# Patient Record
Sex: FEMALE | Race: WHITE
[De-identification: names, ages, dates, MRNs, and addresses within clinical notes are randomized per-mention and may not be internally consistent; named-entity substitution may affect disease eponyms.]

---

## 2017-03-15 ENCOUNTER — HOSPITAL ENCOUNTER (OUTPATIENT)
Dept: HOSPITAL 23 - CCAT | Age: 42
Discharge: HOME | End: 2017-03-15
Payer: COMMERCIAL

## 2017-03-15 DIAGNOSIS — J84.10: ICD-10-CM

## 2017-03-15 DIAGNOSIS — R91.1: Primary | ICD-10-CM

## 2018-03-06 ENCOUNTER — OFFICE VISIT CONVERTED (OUTPATIENT)
Dept: SURGERY | Facility: CLINIC | Age: 43
End: 2018-03-06
Attending: SURGERY

## 2018-03-27 ENCOUNTER — OFFICE VISIT CONVERTED (OUTPATIENT)
Dept: SURGERY | Facility: CLINIC | Age: 43
End: 2018-03-27
Attending: SURGERY

## 2018-08-15 ENCOUNTER — OFFICE VISIT CONVERTED (OUTPATIENT)
Dept: FAMILY MEDICINE CLINIC | Facility: CLINIC | Age: 43
End: 2018-08-15
Attending: NURSE PRACTITIONER

## 2018-08-16 ENCOUNTER — OFFICE VISIT CONVERTED (OUTPATIENT)
Dept: FAMILY MEDICINE CLINIC | Facility: CLINIC | Age: 43
End: 2018-08-16
Attending: NURSE PRACTITIONER

## 2018-08-16 ENCOUNTER — CONVERSION ENCOUNTER (OUTPATIENT)
Dept: FAMILY MEDICINE CLINIC | Facility: CLINIC | Age: 43
End: 2018-08-16

## 2018-08-30 ENCOUNTER — OFFICE VISIT CONVERTED (OUTPATIENT)
Dept: FAMILY MEDICINE CLINIC | Facility: CLINIC | Age: 43
End: 2018-08-30
Attending: NURSE PRACTITIONER

## 2018-10-09 ENCOUNTER — CONVERSION ENCOUNTER (OUTPATIENT)
Dept: FAMILY MEDICINE CLINIC | Facility: CLINIC | Age: 43
End: 2018-10-09

## 2018-10-09 ENCOUNTER — OFFICE VISIT CONVERTED (OUTPATIENT)
Dept: FAMILY MEDICINE CLINIC | Facility: CLINIC | Age: 43
End: 2018-10-09
Attending: NURSE PRACTITIONER

## 2018-10-26 ENCOUNTER — OFFICE VISIT CONVERTED (OUTPATIENT)
Dept: FAMILY MEDICINE CLINIC | Facility: CLINIC | Age: 43
End: 2018-10-26
Attending: NURSE PRACTITIONER

## 2018-10-30 ENCOUNTER — OFFICE VISIT CONVERTED (OUTPATIENT)
Dept: FAMILY MEDICINE CLINIC | Facility: CLINIC | Age: 43
End: 2018-10-30
Attending: NURSE PRACTITIONER

## 2018-12-11 ENCOUNTER — OFFICE VISIT CONVERTED (OUTPATIENT)
Dept: FAMILY MEDICINE CLINIC | Facility: CLINIC | Age: 43
End: 2018-12-11
Attending: NURSE PRACTITIONER

## 2019-01-15 ENCOUNTER — CONVERSION ENCOUNTER (OUTPATIENT)
Dept: FAMILY MEDICINE CLINIC | Facility: CLINIC | Age: 44
End: 2019-01-15

## 2019-01-15 ENCOUNTER — OFFICE VISIT CONVERTED (OUTPATIENT)
Dept: FAMILY MEDICINE CLINIC | Facility: CLINIC | Age: 44
End: 2019-01-15
Attending: FAMILY MEDICINE

## 2019-02-21 ENCOUNTER — OFFICE VISIT CONVERTED (OUTPATIENT)
Dept: FAMILY MEDICINE CLINIC | Facility: CLINIC | Age: 44
End: 2019-02-21
Attending: NURSE PRACTITIONER

## 2019-02-21 ENCOUNTER — HOSPITAL ENCOUNTER (OUTPATIENT)
Dept: FAMILY MEDICINE CLINIC | Facility: CLINIC | Age: 44
Discharge: HOME | End: 2019-02-21
Attending: NURSE PRACTITIONER

## 2019-02-21 ENCOUNTER — CONVERSION ENCOUNTER (OUTPATIENT)
Dept: FAMILY MEDICINE CLINIC | Facility: CLINIC | Age: 44
End: 2019-02-21

## 2019-02-21 LAB
ALBUMIN SERPL-MCNC: 4.1 G/DL (ref 3.5–5)
ALBUMIN/GLOB SERPL: 1.5 {RATIO} (ref 1.4–2.6)
ALP SERPL-CCNC: 64 U/L (ref 42–98)
ALT SERPL-CCNC: 19 U/L (ref 10–40)
ANION GAP SERPL CALC-SCNC: 13 MMOL/L (ref 8–19)
AST SERPL-CCNC: 16 U/L (ref 15–50)
BASOPHILS # BLD AUTO: 0.05 10*3/UL (ref 0–0.2)
BASOPHILS NFR BLD AUTO: 0.4 % (ref 0–3)
BILIRUB SERPL-MCNC: 0.23 MG/DL (ref 0.2–1.3)
BUN SERPL-MCNC: 15 MG/DL (ref 5–25)
BUN/CREAT SERPL: 21 {RATIO} (ref 6–20)
CALCIUM SERPL-MCNC: 8.6 MG/DL (ref 8.7–10.4)
CHLORIDE SERPL-SCNC: 107 MMOL/L (ref 99–111)
CONV ABS IMM GRAN: 0.06 10*3/UL (ref 0–0.2)
CONV CO2: 26 MMOL/L (ref 22–32)
CONV IMMATURE GRAN: 0.5 % (ref 0–1.8)
CONV TOTAL PROTEIN: 6.9 G/DL (ref 6.3–8.2)
CREAT UR-MCNC: 0.73 MG/DL (ref 0.5–0.9)
DEPRECATED RDW RBC AUTO: 43.7 FL (ref 36.4–46.3)
EOSINOPHIL # BLD AUTO: 0.11 10*3/UL (ref 0–0.7)
EOSINOPHIL # BLD AUTO: 0.9 % (ref 0–7)
ERYTHROCYTE [DISTWIDTH] IN BLOOD BY AUTOMATED COUNT: 12.5 % (ref 11.7–14.4)
GFR SERPLBLD BASED ON 1.73 SQ M-ARVRAT: >60 ML/MIN/{1.73_M2}
GLOBULIN UR ELPH-MCNC: 2.8 G/DL (ref 2–3.5)
GLUCOSE SERPL-MCNC: 97 MG/DL (ref 65–99)
HBA1C MFR BLD: 13.6 G/DL (ref 12–16)
HCT VFR BLD AUTO: 42.3 % (ref 37–47)
LYMPHOCYTES # BLD AUTO: 2.34 10*3/UL (ref 1–5)
MCH RBC QN AUTO: 30.6 PG (ref 27–31)
MCHC RBC AUTO-ENTMCNC: 32.2 G/DL (ref 33–37)
MCV RBC AUTO: 95.1 FL (ref 81–99)
MONOCYTES # BLD AUTO: 0.65 10*3/UL (ref 0.2–1.2)
MONOCYTES NFR BLD AUTO: 5.3 % (ref 3–10)
NEUTROPHILS # BLD AUTO: 9.04 10*3/UL (ref 2–8)
NEUTROPHILS NFR BLD AUTO: 73.8 % (ref 30–85)
NRBC CBCN: 0 % (ref 0–0.7)
OSMOLALITY SERPL CALC.SUM OF ELEC: 295 MOSM/KG (ref 273–304)
PLATELET # BLD AUTO: 398 10*3/UL (ref 130–400)
PMV BLD AUTO: 8.2 FL (ref 9.4–12.3)
POTASSIUM SERPL-SCNC: 4.3 MMOL/L (ref 3.5–5.3)
RBC # BLD AUTO: 4.45 10*6/UL (ref 4.2–5.4)
SODIUM SERPL-SCNC: 142 MMOL/L (ref 135–147)
T4 FREE SERPL-MCNC: 1.2 NG/DL (ref 0.9–1.8)
TSH SERPL-ACNC: 0.93 M[IU]/L (ref 0.27–4.2)
VARIANT LYMPHS NFR BLD MANUAL: 19.1 % (ref 20–45)
WBC # BLD AUTO: 12.25 10*3/UL (ref 4.8–10.8)

## 2019-03-25 ENCOUNTER — OFFICE VISIT CONVERTED (OUTPATIENT)
Dept: FAMILY MEDICINE CLINIC | Facility: CLINIC | Age: 44
End: 2019-03-25
Attending: NURSE PRACTITIONER

## 2019-08-09 ENCOUNTER — HOSPITAL ENCOUNTER (OUTPATIENT)
Dept: OTHER | Facility: HOSPITAL | Age: 44
Discharge: HOME | End: 2019-08-09

## 2019-08-09 LAB
25(OH)D3 SERPL-MCNC: 28.4 NG/ML (ref 30–100)
ALBUMIN SERPL-MCNC: 4.2 G/DL (ref 3.5–5)
ALBUMIN/GLOB SERPL: 1.4 {RATIO} (ref 1.4–2.6)
ALP SERPL-CCNC: 55 U/L (ref 42–98)
ALT SERPL-CCNC: 16 U/L (ref 10–40)
ANION GAP SERPL CALC-SCNC: 19 MMOL/L (ref 8–19)
AST SERPL-CCNC: 15 U/L (ref 15–50)
BASOPHILS # BLD AUTO: 0.05 10*3/UL (ref 0–0.2)
BASOPHILS NFR BLD AUTO: 0.4 % (ref 0–3)
BILIRUB SERPL-MCNC: 0.31 MG/DL (ref 0.2–1.3)
BUN SERPL-MCNC: 15 MG/DL (ref 5–25)
BUN/CREAT SERPL: 17 {RATIO} (ref 6–20)
CALCIUM SERPL-MCNC: 8.8 MG/DL (ref 8.7–10.4)
CHLORIDE SERPL-SCNC: 104 MMOL/L (ref 99–111)
CHOLEST SERPL-MCNC: 150 MG/DL (ref 107–200)
CHOLEST/HDLC SERPL: 3.7 {RATIO} (ref 3–6)
CONV ABS IMM GRAN: 0.12 10*3/UL (ref 0–0.2)
CONV CO2: 20 MMOL/L (ref 22–32)
CONV IMMATURE GRAN: 1 % (ref 0–1.8)
CONV TOTAL PROTEIN: 7.2 G/DL (ref 6.3–8.2)
CREAT UR-MCNC: 0.89 MG/DL (ref 0.5–0.9)
DEPRECATED RDW RBC AUTO: 45.6 FL (ref 36.4–46.3)
EOSINOPHIL # BLD AUTO: 0.13 10*3/UL (ref 0–0.7)
EOSINOPHIL # BLD AUTO: 1 % (ref 0–7)
ERYTHROCYTE [DISTWIDTH] IN BLOOD BY AUTOMATED COUNT: 13.4 % (ref 11.7–14.4)
EST. AVERAGE GLUCOSE BLD GHB EST-MCNC: 108 MG/DL
GFR SERPLBLD BASED ON 1.73 SQ M-ARVRAT: >60 ML/MIN/{1.73_M2}
GLOBULIN UR ELPH-MCNC: 3 G/DL (ref 2–3.5)
GLUCOSE SERPL-MCNC: 94 MG/DL (ref 65–99)
HBA1C MFR BLD: 5.4 % (ref 3.5–5.7)
HCT VFR BLD AUTO: 42.8 % (ref 37–47)
HDLC SERPL-MCNC: 41 MG/DL (ref 40–60)
HGB BLD-MCNC: 13.6 G/DL (ref 12–16)
LDLC SERPL CALC-MCNC: 80 MG/DL (ref 70–100)
LYMPHOCYTES # BLD AUTO: 2.48 10*3/UL (ref 1–5)
LYMPHOCYTES NFR BLD AUTO: 19.8 % (ref 20–45)
MCH RBC QN AUTO: 29.7 PG (ref 27–31)
MCHC RBC AUTO-ENTMCNC: 31.8 G/DL (ref 33–37)
MCV RBC AUTO: 93.4 FL (ref 81–99)
MONOCYTES # BLD AUTO: 0.74 10*3/UL (ref 0.2–1.2)
MONOCYTES NFR BLD AUTO: 5.9 % (ref 3–10)
NEUTROPHILS # BLD AUTO: 9.03 10*3/UL (ref 2–8)
NEUTROPHILS NFR BLD AUTO: 71.9 % (ref 30–85)
NRBC CBCN: 0 % (ref 0–0.7)
OSMOLALITY SERPL CALC.SUM OF ELEC: 289 MOSM/KG (ref 273–304)
PLATELET # BLD AUTO: 362 10*3/UL (ref 130–400)
PMV BLD AUTO: 8.5 FL (ref 9.4–12.3)
POTASSIUM SERPL-SCNC: 4.1 MMOL/L (ref 3.5–5.3)
RBC # BLD AUTO: 4.58 10*6/UL (ref 4.2–5.4)
SODIUM SERPL-SCNC: 139 MMOL/L (ref 135–147)
TRIGL SERPL-MCNC: 143 MG/DL (ref 40–150)
TSH SERPL-ACNC: 1.71 M[IU]/L (ref 0.27–4.2)
VLDLC SERPL-MCNC: 29 MG/DL (ref 5–37)
WBC # BLD AUTO: 12.55 10*3/UL (ref 4.8–10.8)

## 2019-10-01 ENCOUNTER — HOSPITAL ENCOUNTER (OUTPATIENT)
Dept: URGENT CARE | Facility: CLINIC | Age: 44
Discharge: HOME | End: 2019-10-01

## 2019-10-17 ENCOUNTER — OFFICE VISIT CONVERTED (OUTPATIENT)
Dept: FAMILY MEDICINE CLINIC | Facility: CLINIC | Age: 44
End: 2019-10-17
Attending: NURSE PRACTITIONER

## 2019-10-17 ENCOUNTER — CONVERSION ENCOUNTER (OUTPATIENT)
Dept: FAMILY MEDICINE CLINIC | Facility: CLINIC | Age: 44
End: 2019-10-17

## 2019-10-24 ENCOUNTER — OFFICE VISIT CONVERTED (OUTPATIENT)
Dept: FAMILY MEDICINE CLINIC | Facility: CLINIC | Age: 44
End: 2019-10-24
Attending: NURSE PRACTITIONER

## 2019-10-24 ENCOUNTER — HOSPITAL ENCOUNTER (OUTPATIENT)
Dept: FAMILY MEDICINE CLINIC | Facility: CLINIC | Age: 44
Discharge: HOME | End: 2019-10-24
Attending: NURSE PRACTITIONER

## 2019-10-24 ENCOUNTER — HOSPITAL ENCOUNTER (OUTPATIENT)
Dept: OTHER | Facility: HOSPITAL | Age: 44
Discharge: HOME | End: 2019-10-24
Attending: NURSE PRACTITIONER

## 2019-10-24 LAB
BASOPHILS # BLD AUTO: 0.08 10*3/UL (ref 0–0.2)
BASOPHILS NFR BLD AUTO: 0.5 % (ref 0–3)
CONV ABS IMM GRAN: 0.42 10*3/UL (ref 0–0.2)
CONV IMMATURE GRAN: 2.9 % (ref 0–1.8)
DEPRECATED RDW RBC AUTO: 45.2 FL (ref 36.4–46.3)
EOSINOPHIL # BLD AUTO: 0.21 10*3/UL (ref 0–0.7)
EOSINOPHIL # BLD AUTO: 1.4 % (ref 0–7)
ERYTHROCYTE [DISTWIDTH] IN BLOOD BY AUTOMATED COUNT: 13.2 % (ref 11.7–14.4)
HCT VFR BLD AUTO: 46.2 % (ref 37–47)
HGB BLD-MCNC: 14.4 G/DL (ref 12–16)
LYMPHOCYTES # BLD AUTO: 2.79 10*3/UL (ref 1–5)
LYMPHOCYTES NFR BLD AUTO: 19 % (ref 20–45)
MCH RBC QN AUTO: 29.4 PG (ref 27–31)
MCHC RBC AUTO-ENTMCNC: 31.2 G/DL (ref 33–37)
MCV RBC AUTO: 94.3 FL (ref 81–99)
MONOCYTES # BLD AUTO: 0.84 10*3/UL (ref 0.2–1.2)
MONOCYTES NFR BLD AUTO: 5.7 % (ref 3–10)
NEUTROPHILS # BLD AUTO: 10.34 10*3/UL (ref 2–8)
NEUTROPHILS NFR BLD AUTO: 70.5 % (ref 30–85)
NRBC CBCN: 0 % (ref 0–0.7)
PLATELET # BLD AUTO: 450 10*3/UL (ref 130–400)
PMV BLD AUTO: 8.2 FL (ref 9.4–12.3)
RBC # BLD AUTO: 4.9 10*6/UL (ref 4.2–5.4)
WBC # BLD AUTO: 14.68 10*3/UL (ref 4.8–10.8)

## 2019-10-27 LAB
CONV EBV EARLY ANTIGEN: <5 U/ML (ref 0–10.9)
CONV EBV NUCLEAR ANTIGEN: <3 U/ML (ref 0–21.9)
CONV EPSTEIN BARR VIRAL CAPSID IGM: <10 U/ML (ref 0–43.9)
CONV EPSTEIN BARR VIRUS ANTIBODY TO VIRAL CAPSID IGG: 173 U/ML (ref 0–21.9)

## 2019-12-06 ENCOUNTER — HOSPITAL ENCOUNTER (OUTPATIENT)
Dept: URGENT CARE | Facility: CLINIC | Age: 44
Discharge: HOME | End: 2019-12-06
Attending: EMERGENCY MEDICINE

## 2020-03-03 ENCOUNTER — OFFICE VISIT CONVERTED (OUTPATIENT)
Dept: FAMILY MEDICINE CLINIC | Facility: CLINIC | Age: 45
End: 2020-03-03
Attending: NURSE PRACTITIONER

## 2020-03-03 ENCOUNTER — HOSPITAL ENCOUNTER (OUTPATIENT)
Dept: FAMILY MEDICINE CLINIC | Facility: CLINIC | Age: 45
Discharge: HOME | End: 2020-03-03
Attending: NURSE PRACTITIONER

## 2020-03-03 LAB
ALBUMIN SERPL-MCNC: 4.1 G/DL (ref 3.5–5)
ALBUMIN/GLOB SERPL: 1.5 {RATIO} (ref 1.4–2.6)
ALP SERPL-CCNC: 60 U/L (ref 42–98)
ALT SERPL-CCNC: 21 U/L (ref 10–40)
ANION GAP SERPL CALC-SCNC: 16 MMOL/L (ref 8–19)
AST SERPL-CCNC: 17 U/L (ref 15–50)
BASOPHILS # BLD AUTO: 0.05 10*3/UL (ref 0–0.2)
BASOPHILS NFR BLD AUTO: 0.5 % (ref 0–3)
BILIRUB SERPL-MCNC: 0.21 MG/DL (ref 0.2–1.3)
BUN SERPL-MCNC: 11 MG/DL (ref 5–25)
BUN/CREAT SERPL: 10 {RATIO} (ref 6–20)
CALCIUM SERPL-MCNC: 8.9 MG/DL (ref 8.7–10.4)
CHLORIDE SERPL-SCNC: 107 MMOL/L (ref 99–111)
CONV ABS IMM GRAN: 0.1 10*3/UL (ref 0–0.2)
CONV CO2: 24 MMOL/L (ref 22–32)
CONV IMMATURE GRAN: 1.1 % (ref 0–1.8)
CONV TOTAL PROTEIN: 6.8 G/DL (ref 6.3–8.2)
CREAT UR-MCNC: 1.08 MG/DL (ref 0.5–0.9)
DEPRECATED RDW RBC AUTO: 44.2 FL (ref 36.4–46.3)
EOSINOPHIL # BLD AUTO: 0.15 10*3/UL (ref 0–0.7)
EOSINOPHIL # BLD AUTO: 1.6 % (ref 0–7)
ERYTHROCYTE [DISTWIDTH] IN BLOOD BY AUTOMATED COUNT: 13.2 % (ref 11.7–14.4)
EST. AVERAGE GLUCOSE BLD GHB EST-MCNC: 123 MG/DL
FERRITIN SERPL-MCNC: 49 NG/ML (ref 10–200)
FOLATE SERPL-MCNC: 5.8 NG/ML (ref 4.8–20)
GFR SERPLBLD BASED ON 1.73 SQ M-ARVRAT: >60 ML/MIN/{1.73_M2}
GLOBULIN UR ELPH-MCNC: 2.7 G/DL (ref 2–3.5)
GLUCOSE SERPL-MCNC: 97 MG/DL (ref 65–99)
HBA1C MFR BLD: 5.9 % (ref 3.5–5.7)
HCT VFR BLD AUTO: 40.9 % (ref 37–47)
HGB BLD-MCNC: 13 G/DL (ref 12–16)
IRON SATN MFR SERPL: 16 % (ref 20–55)
IRON SERPL-MCNC: 59 UG/DL (ref 60–170)
LYMPHOCYTES # BLD AUTO: 2.16 10*3/UL (ref 1–5)
LYMPHOCYTES NFR BLD AUTO: 23.2 % (ref 20–45)
MCH RBC QN AUTO: 28.9 PG (ref 27–31)
MCHC RBC AUTO-ENTMCNC: 31.8 G/DL (ref 33–37)
MCV RBC AUTO: 90.9 FL (ref 81–99)
MONOCYTES # BLD AUTO: 0.52 10*3/UL (ref 0.2–1.2)
MONOCYTES NFR BLD AUTO: 5.6 % (ref 3–10)
NEUTROPHILS # BLD AUTO: 6.32 10*3/UL (ref 2–8)
NEUTROPHILS NFR BLD AUTO: 68 % (ref 30–85)
NRBC CBCN: 0 % (ref 0–0.7)
OSMOLALITY SERPL CALC.SUM OF ELEC: 295 MOSM/KG (ref 273–304)
PLATELET # BLD AUTO: 412 10*3/UL (ref 130–400)
PMV BLD AUTO: 8.1 FL (ref 9.4–12.3)
POTASSIUM SERPL-SCNC: 4.4 MMOL/L (ref 3.5–5.3)
RBC # BLD AUTO: 4.5 10*6/UL (ref 4.2–5.4)
SODIUM SERPL-SCNC: 143 MMOL/L (ref 135–147)
T4 FREE SERPL-MCNC: 1 NG/DL (ref 0.9–1.8)
TIBC SERPL-MCNC: 358 UG/DL (ref 245–450)
TRANSFERRIN SERPL-MCNC: 250 MG/DL (ref 250–380)
TSH SERPL-ACNC: 1.64 M[IU]/L (ref 0.27–4.2)
VIT B12 SERPL-MCNC: 505 PG/ML (ref 211–911)
WBC # BLD AUTO: 9.3 10*3/UL (ref 4.8–10.8)

## 2020-03-08 ENCOUNTER — HOSPITAL ENCOUNTER (OUTPATIENT)
Dept: URGENT CARE | Facility: CLINIC | Age: 45
Discharge: HOME | End: 2020-03-08

## 2020-03-11 LAB
AMOXICILLIN+CLAV SUSC ISLT: 4
AMPICILLIN SUSC ISLT: 4
AMPICILLIN+SULBAC SUSC ISLT: <=2
BACTERIA UR CULT: ABNORMAL
CEFAZOLIN SUSC ISLT: <=4
CEFEPIME SUSC ISLT: <=1
CEFTAZIDIME SUSC ISLT: <=1
CEFTRIAXONE SUSC ISLT: <=1
CEFUROXIME ORAL SUSC ISLT: 4
CEFUROXIME PARENTER SUSC ISLT: 4
CIPROFLOXACIN SUSC ISLT: <=0.25
ERTAPENEM SUSC ISLT: <=0.5
GENTAMICIN SUSC ISLT: <=1
LEVOFLOXACIN SUSC ISLT: <=0.12
NITROFURANTOIN SUSC ISLT: <=16
TETRACYCLINE SUSC ISLT: <=1
TMP SMX SUSC ISLT: <=20
TOBRAMYCIN SUSC ISLT: <=1

## 2020-03-18 ENCOUNTER — OFFICE VISIT CONVERTED (OUTPATIENT)
Dept: FAMILY MEDICINE CLINIC | Facility: CLINIC | Age: 45
End: 2020-03-18
Attending: NURSE PRACTITIONER

## 2020-03-18 ENCOUNTER — HOSPITAL ENCOUNTER (OUTPATIENT)
Dept: FAMILY MEDICINE CLINIC | Facility: CLINIC | Age: 45
Discharge: HOME | End: 2020-03-18
Attending: NURSE PRACTITIONER

## 2020-03-20 LAB
AMOXICILLIN+CLAV SUSC ISLT: 4
AMPICILLIN SUSC ISLT: 8
AMPICILLIN+SULBAC SUSC ISLT: 4
BACTERIA UR CULT: ABNORMAL
CEFAZOLIN SUSC ISLT: <=4
CEFEPIME SUSC ISLT: <=1
CEFTAZIDIME SUSC ISLT: <=1
CEFTRIAXONE SUSC ISLT: <=1
CEFUROXIME ORAL SUSC ISLT: 8
CEFUROXIME PARENTER SUSC ISLT: 8
CIPROFLOXACIN SUSC ISLT: <=0.25
ERTAPENEM SUSC ISLT: <=0.5
GENTAMICIN SUSC ISLT: <=1
LEVOFLOXACIN SUSC ISLT: <=0.12
NITROFURANTOIN SUSC ISLT: 32
TETRACYCLINE SUSC ISLT: <=1
TMP SMX SUSC ISLT: <=20
TOBRAMYCIN SUSC ISLT: <=1

## 2020-08-03 ENCOUNTER — HOSPITAL ENCOUNTER (OUTPATIENT)
Dept: FAMILY MEDICINE CLINIC | Facility: CLINIC | Age: 45
Discharge: HOME | End: 2020-08-03
Attending: NURSE PRACTITIONER

## 2020-08-03 ENCOUNTER — CONVERSION ENCOUNTER (OUTPATIENT)
Dept: FAMILY MEDICINE CLINIC | Facility: CLINIC | Age: 45
End: 2020-08-03

## 2020-08-03 ENCOUNTER — OFFICE VISIT CONVERTED (OUTPATIENT)
Dept: FAMILY MEDICINE CLINIC | Facility: CLINIC | Age: 45
End: 2020-08-03
Attending: NURSE PRACTITIONER

## 2020-08-05 LAB
AMOXICILLIN+CLAV SUSC ISLT: 4
AMPICILLIN SUSC ISLT: 8
AMPICILLIN+SULBAC SUSC ISLT: 4
BACTERIA UR CULT: ABNORMAL
CEFAZOLIN SUSC ISLT: <=4
CEFEPIME SUSC ISLT: <=1
CEFTAZIDIME SUSC ISLT: <=1
CEFTRIAXONE SUSC ISLT: <=1
CEFUROXIME ORAL SUSC ISLT: 4
CEFUROXIME PARENTER SUSC ISLT: 4
CIPROFLOXACIN SUSC ISLT: <=0.25
ERTAPENEM SUSC ISLT: <=0.5
GENTAMICIN SUSC ISLT: <=1
LEVOFLOXACIN SUSC ISLT: <=0.12
NITROFURANTOIN SUSC ISLT: <=16
TETRACYCLINE SUSC ISLT: <=1
TMP SMX SUSC ISLT: <=20
TOBRAMYCIN SUSC ISLT: <=1

## 2020-11-24 ENCOUNTER — OFFICE VISIT CONVERTED (OUTPATIENT)
Dept: FAMILY MEDICINE CLINIC | Facility: CLINIC | Age: 45
End: 2020-11-24
Attending: NURSE PRACTITIONER

## 2021-01-21 ENCOUNTER — TRANSCRIBE ORDERS (OUTPATIENT)
Dept: ADMINISTRATIVE | Facility: HOSPITAL | Age: 46
End: 2021-01-21

## 2021-01-21 ENCOUNTER — TELEMEDICINE CONVERTED (OUTPATIENT)
Dept: FAMILY MEDICINE CLINIC | Facility: CLINIC | Age: 46
End: 2021-01-21
Attending: NURSE PRACTITIONER

## 2021-01-21 DIAGNOSIS — U07.1 CLINICAL DIAGNOSIS OF SEVERE ACUTE RESPIRATORY SYNDROME CORONAVIRUS 2 (SARS-COV-2) DISEASE: Primary | ICD-10-CM

## 2021-01-21 RX ORDER — SODIUM CHLORIDE 9 MG/ML
250 INJECTION, SOLUTION INTRAVENOUS ONCE
Status: CANCELLED | OUTPATIENT
Start: 2021-01-22

## 2021-01-21 RX ORDER — METHYLPREDNISOLONE SODIUM SUCCINATE 125 MG/2ML
125 INJECTION, POWDER, LYOPHILIZED, FOR SOLUTION INTRAMUSCULAR; INTRAVENOUS AS NEEDED
Status: CANCELLED | OUTPATIENT
Start: 2021-01-22

## 2021-01-21 RX ORDER — EPINEPHRINE 1 MG/ML
0.3 INJECTION, SOLUTION, CONCENTRATE INTRAVENOUS AS NEEDED
Status: CANCELLED | OUTPATIENT
Start: 2021-01-22

## 2021-01-21 RX ORDER — DIPHENHYDRAMINE HYDROCHLORIDE 50 MG/ML
50 INJECTION INTRAMUSCULAR; INTRAVENOUS AS NEEDED
Status: CANCELLED | OUTPATIENT
Start: 2021-01-22

## 2021-01-21 NOTE — PROGRESS NOTES
[facility]  Clinical Pharmacy Department     Pharmacy Consult/Review: COVID-19 Monoclonal Antibody    Hattie Pringle is a 45 y.o. female presenting with mild to moderate COVID-19 symptoms and has tested positive for SARS-CoV-2.    COVID-19 Monoclonal Antibody Ordered (bamlanivimab or casirivimab/imdevimab): 1/21/21  Ordering/Consulting Provider: PHILLIP Hewitt  Date of Confirmed SARS-CoV-2: 1/19/21  Date of Symptom Onset : 1/20/21    Allergies  Allergies as of 01/21/2021    (Not on File)     Microbiology  1/19: SARS-COV-2 PCR-positive    Assessment/Plan:    Patient is not hospitalized due to COVID-19 infection and does not require oxygen therapy or an increase in baseline oxygen flow rate due to COVID-19.   All inclusions, exclusions, and monitoring requirements listed below have been reviewed.    Patient has tested positive for SARS-CoV-2.  Patient is within 10 days of symptom onset.  Patient is not hospitalized due to COVID-19 infection.  Patient is not requiring oxygen therapy or an increase in baseline oxygen flow rate.   Patient is at high risk for progressing to severe COVID-19 and/or hospitalization as defined by having met the following criteria: BMI>/=35.  Patient has no known hypersensitivity to any ingredient of bamlanivimab.  Ordering provider has documented that they obtained verbal consent and discussion of FDA EUA Fact Sheet for Patients and Caregivers (physical copy will be provided at infusion site).    Thank you for involving pharmacy in this patient's care. Please contact pharmacy with any questions or concerns.                           Vilma Charles, Pharm.D., [facility]   Clinical Staff Pharmacist  Phone Extension #[phone]  01/21/21 11:50 EST

## 2021-01-22 ENCOUNTER — HOSPITAL ENCOUNTER (OUTPATIENT)
Dept: INFUSION THERAPY | Facility: HOSPITAL | Age: 46
Discharge: HOME | End: 2021-01-22
Admitting: NURSE PRACTITIONER

## 2021-01-22 VITALS
OXYGEN SATURATION: 96 % | RESPIRATION RATE: 16 BRPM | DIASTOLIC BLOOD PRESSURE: 59 MMHG | SYSTOLIC BLOOD PRESSURE: 100 MMHG | HEART RATE: 81 BPM | TEMPERATURE: 99.1 F

## 2021-01-22 DIAGNOSIS — U07.1 CLINICAL DIAGNOSIS OF COVID-19: Primary | ICD-10-CM

## 2021-01-22 PROCEDURE — 25010000006 BAMLANIVIMAB 700 MG/20ML SOLUTION 20 ML VIAL: Performed by: NURSE PRACTITIONER

## 2021-01-22 PROCEDURE — 96366 THER/PROPH/DIAG IV INF ADDON: CPT

## 2021-01-22 PROCEDURE — 96365 THER/PROPH/DIAG IV INF INIT: CPT

## 2021-01-22 PROCEDURE — M0239 BAMLANIVIMAB-XXXX INFUSION: HCPCS | Performed by: NURSE PRACTITIONER

## 2021-01-22 RX ORDER — SODIUM CHLORIDE 9 MG/ML
250 INJECTION, SOLUTION INTRAVENOUS ONCE
Status: CANCELLED | OUTPATIENT
Start: 2021-01-22

## 2021-01-22 RX ORDER — DIPHENHYDRAMINE HYDROCHLORIDE 50 MG/ML
50 INJECTION INTRAMUSCULAR; INTRAVENOUS AS NEEDED
Status: DISCONTINUED | OUTPATIENT
Start: 2021-01-22 | End: 2021-01-24 | Stop reason: HOSPADM

## 2021-01-22 RX ORDER — SODIUM CHLORIDE 9 MG/ML
250 INJECTION, SOLUTION INTRAVENOUS ONCE
Status: DISCONTINUED | OUTPATIENT
Start: 2021-01-22 | End: 2021-01-24 | Stop reason: HOSPADM

## 2021-01-22 RX ORDER — CEPHALEXIN 500 MG/1
500 CAPSULE ORAL 2 TIMES DAILY
COMMUNITY
End: 2021-11-09

## 2021-01-22 RX ORDER — OMEPRAZOLE 40 MG/1
40 CAPSULE, DELAYED RELEASE ORAL DAILY
COMMUNITY
End: 2021-11-09 | Stop reason: SDUPTHER

## 2021-01-22 RX ORDER — ROPINIROLE 0.5 MG/1
0.5 TABLET, FILM COATED ORAL NIGHTLY
COMMUNITY
End: 2021-11-09 | Stop reason: SDUPTHER

## 2021-01-22 RX ORDER — METHYLPREDNISOLONE SODIUM SUCCINATE 125 MG/2ML
125 INJECTION, POWDER, LYOPHILIZED, FOR SOLUTION INTRAMUSCULAR; INTRAVENOUS AS NEEDED
Status: CANCELLED | OUTPATIENT
Start: 2021-01-22

## 2021-01-22 RX ORDER — EPINEPHRINE 1 MG/ML
0.3 INJECTION, SOLUTION, CONCENTRATE INTRAVENOUS AS NEEDED
Status: CANCELLED | OUTPATIENT
Start: 2021-01-22

## 2021-01-22 RX ORDER — DIPHENHYDRAMINE HYDROCHLORIDE 50 MG/ML
50 INJECTION INTRAMUSCULAR; INTRAVENOUS AS NEEDED
Status: CANCELLED | OUTPATIENT
Start: 2021-01-22

## 2021-01-22 RX ORDER — METHYLPREDNISOLONE SODIUM SUCCINATE 125 MG/2ML
125 INJECTION, POWDER, LYOPHILIZED, FOR SOLUTION INTRAMUSCULAR; INTRAVENOUS AS NEEDED
Status: DISCONTINUED | OUTPATIENT
Start: 2021-01-22 | End: 2021-01-24 | Stop reason: HOSPADM

## 2021-01-22 RX ORDER — EPINEPHRINE 1 MG/ML
0.3 INJECTION, SOLUTION, CONCENTRATE INTRAVENOUS AS NEEDED
Status: DISCONTINUED | OUTPATIENT
Start: 2021-01-22 | End: 2021-01-24 | Stop reason: HOSPADM

## 2021-01-22 RX ORDER — ALBUTEROL SULFATE 90 UG/1
2 AEROSOL, METERED RESPIRATORY (INHALATION) EVERY 4 HOURS PRN
COMMUNITY
End: 2021-11-09 | Stop reason: SDUPTHER

## 2021-01-22 RX ADMIN — SODIUM CHLORIDE 700 MG: 9 INJECTION, SOLUTION INTRAVENOUS at 09:13

## 2021-01-22 NOTE — PROGRESS NOTES
Patient and RN in appropriate PPE. Patient reports Covid symptoms of diarrhea, cough, and headache. Patient given Bamlanivimab patient FACT sheet, reviewed and understanding verbalized. Patient tolerated infusion without complaints. VSS. No S/S reaction noted. Patient awaiting transport via wheelchair per door screeners to be returned to vehicle for transportation home. Patient D/C'd from [facility] at 1138.

## 2021-05-07 NOTE — PROGRESS NOTES
Progress Note      Patient Name: Hattie Pringle   Patient ID: [id number]   Sex: Female   YOB: 1975        Visit Date: August 16, 2018    Provider: PHILLIP Proctor   Location: [facility]   Location Address: [address] [place] [address]  [id number]   Location Phone: [phone]          Chief Complaint     woke up with bruise on leg, wants to see if it's a blood clot.       History Of Present Illness  Hattie Pringle is a 43 year old /White,  or  female who presents for evaluation and treatment of:      woke up this morning with a bruise tot he left upper lateral shin area - she was here yesterday for a UTI - feels like left leg is more swollen than right - she was started on Macrobid and Pyridium yesterday and takes a Tylenol sleep aid - left foot feels tingly    Denies injury to the leg    Denies pain with walking in the leg       Past Medical History  Disease Name Date Onset Notes   Anemia --  --    Asthma --  --    Chronic GERD --  --    Diverticulosis --  --    Hiatal hernia --  --    Menorrhagia --  --    MRSA (methicillin resistant Staphylococcus aureus) --  --          Past Surgical History  Procedure Name Date Notes   Foot surgery --  --    Tubal ligation --  --          Medication List  Name Date Started Instructions   Macrobid 100 mg oral capsule 08/15/2018 take 1 capsule (100 mg) by oral route every 12 hours with food for 5 days   Pyridium 200 mg oral tablet 08/15/2018 take 1 tablet (200 mg) by oral route 3 times per day after meals for 2 days as needed         Allergy List  Allergen Name Date Reaction Notes   Neurontin --  --  --    Tussionex --  --  --          Family Medical History  Disease Name Relative/Age Notes   Arthritis, Rheumatoid / --    Breast Neoplasm, Malignant / --    Cardiac Conduction Disorder / --    Chronic Obstructive Pulmonary Disease / --    Diabetes mellitus, type II / --    Hypertension / --          Social  History  Finding Status Start/Stop Quantity Notes   Former smoker --  --/-- --  --    Tobacco Former --/-- --  --          Review of Systems  · Cardiovascular  o Denies  o : chest pain, shortness of breath, palpitations  · Respiratory  o Denies  o : shortness of breath, wheezing, cough  · Musculoskeletal  o * See HPI  · Heme-Lymph  o Admits  o : easy bruising  o Denies  o : petechiae, lymph node enlargement or tenderness      Vitals  Date Time BP Position Site L\R Cuff Size HR RR TEMP(F) WT  HT  BMI kg/m2 BSA m2 O2 Sat HC       08/16/2018 11:20 /74 Sitting    130 - R  98.2 207lbs 8oz    97 %           Physical Examination  · Constitutional  o Appearance  o : well developed, well-nourished, in no acute distress  · Eyes  o Conjunctivae  o : conjunctivae normal  o Pupils and Irises  o : pupils equal and round, pupils reactive to light bilaterally  · Neck  o Inspection/Palpation  o : supple  o Thyroid  o : no thyromegaly  · Respiratory  o Respiratory Effort  o : breathing unlabored  o Auscultation of Lungs  o : clear to ascultation  · Cardiovascular  o Heart  o :   § Auscultation of Heart  § : regular rate and rhythm  o Peripheral Vascular System  o :   § Extremities  § : no edema  · Lymphatic  o Neck  o : no lymphadenopathy present  · Musculoskeletal  o General  o :   § General Musculoskeletal  § : left calf tenderness with deep palpation and with flexion of the left foot. No joint swelling or deformity. Muscle tone, strength, and development grossly normal.  · Skin and Subcutaneous Tissue  o General Inspection  o : Left lower leg with upper shin bruising  · Neurologic  o Gait and Station  o :   § Gait Screening  § : normal gait  · Psychiatric  o Mood and Affect  o : mood normal, affect appropriate              Assessment  · Bruise     924.9/T14.8XXA  · R/O DVT (deep venous thrombosis)     453.40/I82.409      Plan  · Orders  o ACO-39: Current medications updated and reviewed () - - 08/16/2018  o Venous  Duplex Lower Extremity LEFT [facility] ([id number]) - 924.9/T14.8XXA - 08/16/2018  · Instructions  o Patient was educated/instructed on their diagnosis, treatment and medications prior to discharge from the clinic today.  o Pt to have US today and will notify pt of results.   · Disposition  o Follow up as needed.            Electronically Signed by: PHILLIP Proctor -Author on August 16, 2018 12:07:32 PM

## 2021-05-07 NOTE — PROGRESS NOTES
Progress Note      Patient Name: Hattie Pringle   Patient ID: [id number]   Sex: Female   YOB: 1975        Visit Date: August 3, 2020    Provider: PHILLIP Hewitt   Location: [facility]   Location Address: [address] [place] [address] [place]  [address]   Location Phone: [phone]          Chief Complaint     dysuria       History Of Present Illness  Hattie Pringle is a 45 year old /White,  or  female who presents for evaluation and treatment of:      acute onset of dysuria yesterday - mainly just burning and she did take 1 AZO and that helped, but this AM she did notice a little bit of blood in her urine - s/p partial hysterectomy and denies any abnormal vaginal bleeding.       Past Medical History  Disease Name Date Onset Notes   Anemia --  --    Asthma --  --    Chronic GERD --  --    Depression (emotion) --  --    Diverticulosis --  --    Hiatal hernia --  --    Limited joint range of motion (ROM) --  --    Menorrhagia --  --    MRSA (methicillin resistant Staphylococcus aureus) --  --    Nerve Damage --  --          Past Surgical History  Procedure Name Date Notes   Cholecstectomy --  --    Foot surgery --  --    Partial Hysterectomy --  --    Tubal ligation --  --          Medication List  Name Date Started Instructions   cyclobenzaprine 10 mg oral tablet 10/26/2018 take 1/2 to 1 tablet (5-10 mg) by oral route 3 times per day as needed for headache (to use with Ibuprofen)   montelukast 10 mg oral tablet 03/03/2020 TAKE 1 TABLET(10 MG) BY MOUTH EVERY DAY IN THE EVENING   omeprazole 40 mg oral capsule,delayed release(DR/EC) 03/03/2020 take 1 capsule (40 mg) by oral route once daily before a meal for 30 days   ropinirole 0.5 mg oral tablet 03/03/2020 take 1 tablet (0.5 mg) by oral route 1-3 hours before bedtime for 30 days   Ventolin HFA 90 mcg/actuation inhalation HFA aerosol inhaler 03/03/2020 inhale 1 - 2 puffs (90 - 180 mcg) by inhalation  route every 4-6 hours as needed for 30 days   Zoloft 50 mg oral tablet 03/03/2020 take 1/2 tab daily x 4 days then start 1 tablet (50 mg) by oral route once daily thereafter         Allergy List  Allergen Name Date Reaction Notes   Neurontin --  --  --    Tussionex --  --  --          Family Medical History  Disease Name Relative/Age Notes   Chronic Obstructive Pulmonary Disease  --    Arthritis, Rheumatoid  --    Breast Neoplasm, Malignant  --    Cardiac Conduction Disorder  --    Hypertension  --    Diabetes mellitus, type II  --          Social History  Finding Status Start/Stop Quantity Notes   Former smoker --  --/-- --  --    Tobacco Former --/-- --  --          Immunizations  NameDate Admin Mfg Trade Name Lot Number Route Inj VIS Given VIS Publication   [id number]/04/2019 NE Not Entered  NE NE 09/05/2019    Comments:          Review of Systems  · Constitutional  o Admits  o : good general health lately  o Denies  o : fever, chills, body aches  · Gastrointestinal  o Denies  o : nausea, vomiting, abdominal pain  · Genitourinary  o Admits  o : dysuria, nocturia, hematuria  o Denies  o : urgency, frequency, urinary retention  · Musculoskeletal  o Denies  o : back pain      Vitals  Date Time BP Position Site L\R Cuff Size HR RR TEMP (F) WT  HT  BMI kg/m2 BSA m2 O2 Sat        08/03/2020 10:11 /80 Sitting    113 - R  97.8 237lbs 0oz    96 %          Physical Examination  · Constitutional  o Appearance  o : well developed, well-nourished, in no acute distress  · Respiratory  o Respiratory Effort  o : breathing unlabored  o Auscultation of Lungs  o : clear to auscultation  · Cardiovascular  o Heart  o :   § Auscultation of Heart  § : regular rate and rhythm  · Gastrointestinal  o Abdominal Examination  o :   § Abdomen  § : soft, non-tender, non-distended, bowel sounds +; no CVA tenderness bilaterally  · Lymphatic  o Neck  o : no lymphadenopathy present  · Musculoskeletal  o General  o :   § General  Musculoskeletal  § : Muscle tone, strength, and development grossly normal.  · Skin and Subcutaneous Tissue  o General Inspection  o : no lesions present, no areas of discoloration, skin turgor normal, texture normal  · Neurologic  o Gait and Station  o :   § Gait Screening  § : normal gait  · Psychiatric  o Mood and Affect  o : mood normal, affect appropriate          Assessment  · Microhematuria     599.72/R31.29  · Dysuria     788.1/R30.0    Problems Reconciled  Plan  · Orders  o IOP - Urinalysis without Microscopy (Clinitek) [facility] ([id number]) - 788.1/R30.0 - 08/03/2020   glu neg, michael neg, ket neg, sg 1.020, blo small, ph 7.0, pro neg, uro 2.0, nit neg, viri moderate   o Urine culture ([id number], [id number]) - 599.72/R31.29, 788.1/R30.0 - 08/03/2020  o ACO-39: Current medications updated and reviewed () - - 08/03/2020  · Medications  o Macrobid 100 mg oral capsule   SIG: take 1 capsule (100 mg) by oral route every 12 hours with food for 7 days   DISP: (14) capsules with 0 refills  Prescribed on 08/03/2020     o Medications have been Reconciled  o Transition of Care or Provider Policy  · Instructions  o Take all medications as prescribed/directed.  o Increase Fluids.  o Patient was educated/instructed on their diagnosis, treatment and medications prior to discharge from the clinic today.            Electronically Signed by: PHILLIP Hewitt -Author on August 3, 2020 10:25:22 AM

## 2021-05-07 NOTE — PROGRESS NOTES
Progress Note      Patient Name: Hattie Pringle   Patient ID: [id number]   Sex: Female   YOB: 1975        Visit Date: August 15, 2018    Provider: PHILLIP Hewitt   Location: [facility]   Location Address: [address] [place] [address]  [id number]   Location Phone: [phone]          Chief Complaint     dysuria, N/V       History Of Present Illness  Hattie Pringle is a 43 year old /White,  or  female who presents for evaluation and treatment of:      acute onset of nausea and malodorous urine yesterday--woke up with chills this morning--no fever, no vomiting, no back pain--does describe suprapubic pressure.       Past Medical History  Disease Name Date Onset Notes   Anemia --  --    Asthma --  --    Chronic GERD --  --    Diverticulosis --  --    Hiatal hernia --  --    Menorrhagia --  --    MRSA (methicillin resistant Staphylococcus aureus) --  --          Past Surgical History  Procedure Name Date Notes   Foot surgery --  --    Tubal ligation --  --          Allergy List  Allergen Name Date Reaction Notes   Neurontin --  --  --    Tussionex --  --  --          Family Medical History  Disease Name Relative/Age Notes   Arthritis, Rheumatoid / --    Breast Neoplasm, Malignant / --    Cardiac Conduction Disorder / --    Chronic Obstructive Pulmonary Disease / --    Diabetes mellitus, type II / --    Hypertension / --          Social History  Finding Status Start/Stop Quantity Notes   Former smoker --  --/-- --  --    Tobacco Former --/-- --  --          Review of Systems  · Constitutional  o Admits  o : chills  o Denies  o : fever  · Cardiovascular  o Denies  o : chest pain, palpitations  · Respiratory  o Denies  o : cough  · Gastrointestinal  o Admits  o : nausea, abdominal pain  o Denies  o : vomiting, diarrhea, constipation  · Genitourinary  o Admits  o : dysuria, malodorous urine  o Denies  o : urgency, frequency, hematuria, urinary  retention  · Integument  o Denies  o : pigmentation changes      Vitals  Date Time BP Position Site L\R Cuff Size HR RR TEMP(F) WT  HT  BMI kg/m2 BSA m2 O2 Sat HC       08/15/2018 09:33 /80 Sitting    110 - R   210lbs 0oz    97 %           Physical Examination  · Constitutional  o Appearance  o : well developed, well-nourished, in no acute distress  · Respiratory  o Respiratory Effort  o : breathing unlabored  o Auscultation of Lungs  o : clear to ascultation  · Cardiovascular  o Heart  o :   § Auscultation of Heart  § : regular rate and rhythm  o Peripheral Vascular System  o :   § Extremities  § : no edema  · Gastrointestinal  o Abdominal Examination  o :   § Abdomen  § : soft, suprapubic tenderness, no rebound tenderness or guarding, bowel sounds +  · Lymphatic  o Neck  o : no lymphadenopathy present  · Musculoskeletal  o General  o :   § General Musculoskeletal  § : No joint swelling or deformity. Muscle tone, strength, and development grossly normal.  · Skin and Subcutaneous Tissue  o General Inspection  o : no lesions present, no areas of discoloration, skin turgor normal, texture normal  · Neurologic  o Gait and Station  o :   § Gait Screening  § : normal gait  · Psychiatric  o Mood and Affect  o : mood normal, affect appropriate          Assessment  · Urinary tract infection     599.0/N39.0  · Nausea     787.02/R11.0  · Malodorous urine     791.9/R82.90      Plan  · Orders  o IOP - Urinalysis without Microscopy (Clinitek) [facility] ([id number]) - 787.02/R11.0, 791.9/R82.90 - 08/15/2018   glu neg, michael neg, ket neg, sg >=1.005, blo trace, ph 5.5, pro neg, uro 0.2, nit neg, viri small  o Urine culture ([id number], 04081) - 787.02/R11.0, 791.9/R82.90 - 08/15/2018  o ACO-39: Current medications updated and reviewed () - - 08/15/2018  · Medications  o Macrobid 100 mg oral capsule   SIG: take 1 capsule (100 mg) by oral route every 12 hours with food for 5 days   DISP: (10) capsules with 0 refills  Prescribed on  08/15/2018     o Pyridium 200 mg oral tablet   SIG: take 1 tablet (200 mg) by oral route 3 times per day after meals for 2 days as needed   DISP: (6) tablets with 0 refills  Prescribed on 08/15/2018     · Instructions  o Increase fluid intake. If you develop fever, chills, N/V, flank pain, or worsening of your symptoms return to the office or go to the ER.  o Take all medications as prescribed/directed.  o Patient was educated/instructed on their diagnosis, treatment and medications prior to discharge from the clinic today.  · Disposition  o Call or Return if symptoms worsen or persist.            Electronically Signed by: PHILLIP Hewitt -Author on August 15, 2018 09:50:03 AM

## 2021-05-07 NOTE — PROGRESS NOTES
"   Progress Note      Patient Name: Hattie Pringle   Patient ID: [id number]   Sex: Female   YOB: 1975        Visit Date: November 24, 2020    Provider: PHILLIP Hewitt   Location: [facility]   Location Address: [address] [place] [address] [place]  [address]   Location Phone: [phone]          Chief Complaint     UTI symptoms       History Of Present Illness  Hattie Pringle is a 45 year old /White,  or  female who presents for evaluation and treatment of:      started with dysuria on Saturday of last week. Then noticed some blood with urination x1. Had \"left over\" Macrobid from prior UTI. She took 2 of them and was feeling better for a day or 2, then symptoms started to recur - mainly the burning with urination. No gross hematuria since the first occurrence. No fever, chills, or body aches. No nausea, vomiting, abdominal pain, or lower back pain.       Past Medical History  Disease Name Date Onset Notes   Anemia --  --    Asthma --  --    Chronic GERD --  --    Depression (emotion) --  --    Diverticulosis --  --    Hiatal hernia --  --    Limited joint range of motion (ROM) --  --    Menorrhagia --  --    MRSA (methicillin resistant Staphylococcus aureus) --  --    Nerve Damage --  --          Past Surgical History  Procedure Name Date Notes   Cholecstectomy --  --    Foot surgery --  --    Partial Hysterectomy --  --    Tubal ligation --  --          Medication List  Name Date Started Instructions   cyclobenzaprine 10 mg oral tablet 10/26/2018 take 1/2 to 1 tablet (5-10 mg) by oral route 3 times per day as needed for headache (to use with Ibuprofen)   montelukast 10 mg oral tablet 03/03/2020 TAKE 1 TABLET(10 MG) BY MOUTH EVERY DAY IN THE EVENING   omeprazole 40 mg oral capsule,delayed release(/EC) 03/03/2020 take 1 capsule (40 mg) by oral route once daily before a meal for 30 days   ropinirole 0.5 mg oral tablet 03/03/2020 take 1 tablet (0.5 mg) by " oral route 1-3 hours before bedtime for 30 days   Ventolin HFA 90 mcg/actuation inhalation HFA aerosol inhaler 03/03/2020 inhale 1 - 2 puffs (90 - 180 mcg) by inhalation route every 4-6 hours as needed for 30 days         Allergy List  Allergen Name Date Reaction Notes   Neurontin --  --  --    Tussionex --  --  --          Family Medical History  Disease Name Relative/Age Notes   Chronic Obstructive Pulmonary Disease  --    Arthritis, Rheumatoid  --    Breast Neoplasm, Malignant  --    Cardiac Conduction Disorder  --    Hypertension  --    Diabetes mellitus, type II  --          Social History  Finding Status Start/Stop Quantity Notes   Former smoker --  --/-- --  --    Tobacco Former --/-- --  --          Immunizations  NameDate Admin Mfg Trade Name Lot Number Route Inj VIS Given VIS Publication   [id number]/04/2019 NE Not Entered  NE NE 09/05/2019    Comments:          Review of Systems  · Constitutional  o Denies  o : fever, chills, body aches  · Gastrointestinal  o Denies  o : nausea, vomiting, abdominal pain  · Genitourinary  o Admits  o : dysuria, hematuria  o Denies  o : urgency, frequency, urinary retention  · Musculoskeletal  o Denies  o : back pain      Vitals  Date Time BP Position Site L\R Cuff Size HR RR TEMP (F) WT  HT  BMI kg/m2 BSA m2 O2 Sat FR L/min FiO2        11/24/2020 10:30 /84 Sitting    119 - R  97.8 245lbs 8oz    97 %  21%          Physical Examination  · Constitutional  o Appearance  o : well developed, well-nourished, in no acute distress  · Respiratory  o Respiratory Effort  o : breathing unlabored  o Auscultation of Lungs  o : clear to auscultation  · Cardiovascular  o Heart  o :   § Auscultation of Heart  § : regular rate and rhythm  · Gastrointestinal  o Abdominal Examination  o :   § Abdomen  § : soft, non-tender, non-distended, bowel sounds +; no CVA tenderness present  · Musculoskeletal  o General  o :   § General Musculoskeletal  § : Muscle tone, strength, and development  grossly normal.  · Skin and Subcutaneous Tissue  o General Inspection  o : no lesions present, no areas of discoloration, skin turgor normal, texture normal  · Neurologic  o Gait and Station  o :   § Gait Screening  § : normal gait  · Psychiatric  o Mood and Affect  o : mood normal, affect appropriate          Assessment  · Dysuria     788.1/R30.0      Plan  · Orders  o IOP - Urinalysis without Microscopy (Clinitek) [facility] ([id number]) - 788.1/R30.0 - 11/24/2020   glu neg, michael neg, ket trace, sg 1.025, blo neg, ph 6.0, pro neg, ur 1.0, nit neg, viri moderate   o ACO-39: Current medications updated and reviewed (1159F, ) - - 11/24/2020  · Medications  o Macrobid 100 mg oral capsule   SIG: take 1 capsule (100 mg) by oral route every 12 hours with food for 5 days   DISP: (10) Capsule with 0 refills  Prescribed on 11/24/2020     o Medications have been Reconciled  o Transition of Care or Provider Policy  · Instructions  o Take all medications as prescribed/directed. Advised patient to complete entire abx RX - she should never have left over antibiotics.   o Increase Fluids.  o Patient was educated/instructed on their diagnosis, treatment and medications prior to discharge from the clinic today.            Electronically Signed by: PHILLIP Hewitt -Author on November 24, 2020 10:51:47 AM

## 2021-05-07 NOTE — PROGRESS NOTES
Progress Note      Patient Name: Hattie Pringle   Patient ID: [id number]   Sex: Female   YOB: 1975        Visit Date: October 24, 2019    Provider: PHILLIP Proctor   Location: [facility]   Location Address: [address] [place] [address] [place]  [address]   Location Phone: [phone]          Chief Complaint     Headache and neck still bothering her from the swollen lymph node.       History Of Present Illness  Hattie Pringle is a 44 year old /White,  or  female who presents for evaluation and treatment of:      here 1 week ago and dx with bronchitis and lymphadenopathy of left neck - pt completed steroid and azithyromycin - continues to have discomfort atound the lymph node area and turning head to the right and left causes lightheadedness and getting headache over the left eye and to the posterior neck       Past Medical History  Disease Name Date Onset Notes   Anemia --  --    Asthma --  --    Chronic GERD --  --    Diverticulosis --  --    Hiatal hernia --  --    Limited joint range of motion (ROM) --  --    Menorrhagia --  --    MRSA (methicillin resistant Staphylococcus aureus) --  --    Nerve Damage --  --          Past Surgical History  Procedure Name Date Notes   Cholecstectomy --  --    Foot surgery --  --    Partial Hysterectomy --  --    Tubal ligation --  --          Medication List  Name Date Started Instructions   cyclobenzaprine 10 mg oral tablet 10/26/2018 take 1/2 to 1 tablet (5-10 mg) by oral route 3 times per day as needed for headache (to use with Ibuprofen)   omeprazole 40 mg oral capsule,delayed release(/EC) 08/30/2018 take 1 capsule (40 mg) by oral route once daily before a meal for 30 days   phentermine 37.5 mg oral tablet  take 1 tablet (37.5 mg) by oral route once daily before breakfast   Requip 0.5 mg oral tablet 08/30/2018 take 1 tablet (0.5 mg) by oral route 1-3 hours before bedtime for 30 days   Tessalon Perles 100 mg  oral capsule 10/17/2019 take 2 capsules (200 mg) by oral route 3 times per day as needed for cough for 7 days   Zofran 8 mg oral tablet 03/25/2019 take 1 tablet by oral route 3 times a day as needed for 4 days N/V         Allergy List  Allergen Name Date Reaction Notes   Neurontin --  --  --    Tussionex --  --  --          Family Medical History  Disease Name Relative/Age Notes   Chronic Obstructive Pulmonary Disease  --    Arthritis, Rheumatoid  --    Breast Neoplasm, Malignant  --    Cardiac Conduction Disorder  --    Hypertension  --    Diabetes mellitus, type II  --          Social History  Finding Status Start/Stop Quantity Notes   Former smoker --  --/-- --  --    Tobacco Former --/-- --  --          Immunizations  NameDate Admin Mfg Trade Name Lot Number Route Inj VIS Given VIS Publication   [id number]/04/2019 NE Not Entered  NE NE 09/05/2019    Comments:          Review of Systems  · Constitutional  o Admits  o : fatigue, headache  o Denies  o : fever, chills, body aches  · HENT  o Admits  o : neck tenderness, swollen glands in neck, enlarged lymph nodes  o Denies  o : nasal congestion, nasal discharge, neck stiffness, sore throat, difficulty swallowing  · Respiratory  o Admits  o : cough  o Denies  o : shortness of breath, wheezing  · Gastrointestinal  o Denies  o : nausea, vomiting, diarrhea      Vitals  Date Time BP Position Site L\R Cuff Size HR RR TEMP (F) WT  HT  BMI kg/m2 BSA m2 O2 Sat        10/24/2019 08:36 /82 Sitting    113 - R  97.2 221lbs 8oz    97 %          Physical Examination  · Constitutional  o Appearance  o : well developed, well-nourished, in no acute distress  · Eyes  o Conjunctivae  o : conjunctivae normal  o Pupils and Irises  o : pupils equal and round, pupils reactive to light bilaterally  · Ears, Nose, Mouth and Throat  o Ears  o :   § External Ears  § : no auricle tenderness to palpation present  § Otoscopic Examination  § : tympanic membrane appearance within normal  limits bilaterally  § Hearing  § : response to sound normal, no tinnitus  o Nose  o :   § Intranasal Exam  § : sinuses non tender to percussion  o Throat  o :   § Oropharynx  § : no inflammation or lesions present, narrow opening  · Neck  o Inspection/Palpation  o : tenderness present to left and mild edema of left neck  o Thyroid  o : no thyromegaly  · Respiratory  o Respiratory Effort  o : breathing unlabored  o Auscultation of Lungs  o : clear to ascultation  · Cardiovascular  o Heart  o :   § Auscultation of Heart  § : regular rate and rhythm  o Peripheral Vascular System  o :   § Extremities  § : no edema  · Lymphatic  o Neck  o : enlarged lymph nodes present to left posterior and to left anterior  · Musculoskeletal  o General  o :   § General Musculoskeletal  § : No joint swelling or deformity. Muscle tone, strength, and development grossly normal.  · Skin and Subcutaneous Tissue  o General Inspection  o : NL tone  · Neurologic  o Gait and Station  o :   § Gait Screening  § : normal gait  · Psychiatric  o Mood and Affect  o : mood normal, affect appropriate              Assessment  · Fatigue     780.79/R53.83  · Left Posterior Lymphadenopathy     785.6/R59.1    Problems Reconciled  Plan  · Orders  o CBC with Auto Diff [facility] (16565) - 780.79/R53.83 - 10/24/2019  o EBV antibody panel (17997, 73352, 02654) - 780.79/R53.83 - 10/24/2019  o ACO-39: Current medications updated and reviewed () - - 10/24/2019  o Neck US (72092) - 785.6/R59.1 - 10/24/2019  · Medications  o Keflex 500 mg oral capsule   SIG: take 1 capsule by oral route every 8 hours for 10 days   DISP: (30) capsules with 0 refills  Prescribed on 10/24/2019     o phentermine 37.5 mg oral tablet   SIG: take 1 tablet (37.5 mg) by oral route once daily before breakfast   DISP: (0) tablet with 0 refills  Discontinued on 10/24/2019     o Medications have been Reconciled  o Transition of Care or Provider Policy  · Instructions  o Patient was  educated/instructed on their diagnosis, treatment and medications prior to discharge from the clinic today.  · Disposition  o Follow up as needed.            Electronically Signed by: PHILLIP Proctor -Author on October 24, 2019 09:00:43 AM

## 2021-05-07 NOTE — PROGRESS NOTES
"   Progress Note      Patient Name: Hattie Pringle   Patient ID: [id number]   Sex: Female   YOB: 1975    Referring Provider: Larissa FISHER    Visit Date: March 3, 2020    Provider: PHILLIP Jones   Location: [facility]   Location Address: [address] [place] [address]   [place]  [address]   Location Phone: [phone]          Chief Complaint     PATIENT IS HERE TO GET A REFILL ON HER INHALER.  SHE SAID SHE IS HAVING SOME SEASONAL ALLERGY PROBLEMS AND NEEDS A NEW OWN.      MAMMOGRAM- SHE IS DUE FOR ONE  PAP SMEAR- NO OVERIES- 3 YEARS A GO  DEPRESSION SHEET NOW       History Of Present Illness  Hattie Pringle is a 45 year old /White,  or  female who presents for evaluation and treatment of:      2 yrs ago--but made her more depressed--then switched to Zoloft--but pt does not recall ever starting that one--    pt needs mammo--     RLS--stable but needs refills on the med  GERD and HH--stable on the med--only    asthma stable as well    lost her daughter in 2019 --she reports she was drinking and driving-pt just overwhelmed with grief--    one child in head start [employer] and one child graduating and going to [employer] [[email or web address]] this yr     pre-diabetes Hx     and pt keeps asking herself \"why was it not me\"--and no real SI/HI BC of her one child and grandbaby --she loves--    pt also bought a new home last yr just prior to all this     hx of anemia\">pt lost her daughter last yr-and now has custody of her grandbaby and reports tired all the time and then the also overwhelmed HAS and then eating self to death\"--granddaughter will be 3 tomorrow     been through a lot--and does admit to depression --    and needs to see about getting on the Chantix--again--due to with everything that went on --restarted smoking again--    then also has asthma and needs refills on the rescue inhaler--and been stable but he inhaler is  and ran out--and needs renewed--  and " "then also seasonal allergies as well    then pt was on Wellbutrin >2 yrs ago--but made her more depressed--then switched to Zoloft--but pt does not recall ever starting that one--    pt needs mammo--     RLS--stable but needs refills on the med  GERD and HH--stable on the med--only    asthma stable as well    lost her daughter in Nov 2019 --she reports she was drinking and driving-pt just overwhelmed with grief--    one child in head start [employer] and one child graduating and going to [employer] this yr     pre-diabetes Hx     and pt keeps asking herself \"why was it not me\"--and no real SI/HI BC of her one child and grandbaby --she loves--    pt also bought a new home last yr just prior to all this     hx of anemia       Past Medical History  Disease Name Date Onset Notes   Anemia --  --    Asthma --  --    Chronic GERD --  --    Diverticulosis --  --    Hiatal hernia --  --    Limited joint range of motion (ROM) --  --    Menorrhagia --  --    MRSA (methicillin resistant Staphylococcus aureus) --  --    Nerve Damage --  --          Past Surgical History  Procedure Name Date Notes   Cholecstectomy --  --    Foot surgery --  --    Partial Hysterectomy --  --    Tubal ligation --  --          Medication List  Name Date Started Instructions   Bactrim -160 mg oral tablet 03/18/2020 take 1 tablet by oral route every 12 hours for 7 days   codeine-guaifenesin  mg/5 mL oral liquid 03/18/2020 take 5 milliliters by oral route 4 times a day as needed for 5 days cough   cyclobenzaprine 10 mg oral tablet 10/26/2018 take 1/2 to 1 tablet (5-10 mg) by oral route 3 times per day as needed for headache (to use with Ibuprofen)   montelukast 10 mg oral tablet 03/03/2020 TAKE 1 TABLET(10 MG) BY MOUTH EVERY DAY IN THE EVENING   omeprazole 40 mg oral capsule,delayed release(DR/EC) 03/03/2020 take 1 capsule (40 mg) by oral route once daily before a meal for 30 days   ropinirole 0.5 mg oral tablet 03/03/2020 take 1 tablet (0.5 mg) by " oral route 1-3 hours before bedtime for 30 days   Tamiflu 75 mg oral capsule 03/18/2020 take 1 capsule (75 mg) by oral route 2 times per day for 5 days   Tessalon Perles 100 mg oral capsule 03/18/2020 take 1 capsule (100 mg) by oral route 3 times per day as needed for cough for 10 days         Allergy List  Allergen Name Date Reaction Notes   Neurontin --  --  --    Tussionex --  --  --          Family Medical History  Disease Name Relative/Age Notes   Chronic Obstructive Pulmonary Disease  --    Arthritis, Rheumatoid  --    Breast Neoplasm, Malignant  --    Cardiac Conduction Disorder  --    Hypertension  --    Diabetes mellitus, type II  --          Social History  Finding Status Start/Stop Quantity Notes   Former smoker --  --/-- --  --    Tobacco Former --/-- --  --          Immunizations  NameDate Admin Mfg Trade Name Lot Number Route Inj VIS Given VIS Publication   [id number]/04/2019 NE Not Entered  NE NE 09/05/2019    Comments:          Review of Systems  · Constitutional  o Admits  o : fatigue, weight gain  o Denies  o : fever  · Eyes  o Admits  o : blurred vision  o Denies  o : discharge from eye, eye discomfort, eye pain, impaired vision  · HENT  o Admits  o : headaches, vertigo, nasal congestion, postnasal drip  o Denies  o : recent head injury  · Cardiovascular  o Denies  o : chest pain, irregular heart beats  · Respiratory  o Admits  o : asthma or wheezing  o Denies  o : shortness of breath, wheezing, cough  · Gastrointestinal  o Admits  o : heartburn  o Denies  o : nausea, vomiting, reflux  · Genitourinary  o Denies  o : dysuria  · Integument  o Denies  o : hair growth change, new skin lesions  · Neurologic  o Denies  o : altered mental status, seizures, tremors  · Musculoskeletal  o Admits  o : foot pain, reviewed and unchanged  o Denies  o : joint swelling, limitation of motion  · Endocrine  o Denies  o : cold intolerance, heat intolerance  · Psychiatric  o Admits  o : depression, additional  "psychiatric symptoms except as noted in the HPI  o Denies  o : suicidal ideation, homicidal ideation  · Heme-Lymph  o Denies  o : petechiae, lymph node enlargement or tenderness  · Allergic-Immunologic  o Denies  o : frequent illnesses      Vitals  Date Time BP Position Site L\R Cuff Size HR RR TEMP (F) WT  HT  BMI kg/m2 BSA m2 O2 Sat HC       03/03/2020 11:28 /70 Sitting    105 - R  98 244lbs 5oz 5'  5\" 40.66 2.25 98 %          Physical Examination  · Constitutional  o Appearance  o : well developed, well-nourished, in no acute distress  · Head and Face  o HEENT  o : Unremarkable  · Eyes  o Conjunctivae  o : conjunctivae normal  · Neck  o Inspection/Palpation  o : supple  o Thyroid  o : no thyromegaly  · Respiratory  o Respiratory Effort  o : breathing unlabored  o Auscultation of Lungs  o : clear to ascultation  · Cardiovascular  o Heart  o :   § Auscultation of Heart  § : regular rate and rhythm  o Peripheral Vascular System  o :   § Extremities  § : no edema  · Gastrointestinal  o Abdominal Examination  o :   § Abdomen  § : soft  · Lymphatic  o Neck  o : no lymphadenopathy present  · Musculoskeletal  o General  o :   § General Musculoskeletal  § : No joint swelling or deformity., Muscle tone, strength, and development grossly normal. except the chronically fixated right ankle and then limited ROM and chronic pain to the ankle   · Skin and Subcutaneous Tissue  o General Inspection  o : skin turgor normal, texture normal  · Neurologic  o Gait and Station  o :   § Gait Screening  § : limps with the right ankle  · Psychiatric  o Mood and Affect  o : mood normal, affect appropriate--except when Discussing about her daughter and their family loss of her--very tearful           Assessment  · Allergic rhinitis due to allergen     477.9/J30.9  · Anemia     285.9/D64.9  · Asthma     493.90/J45.909  · Fatigue     780.79/R53.83  · GERD (gastroesophageal reflux disease)     530.81/K21.9  · Grief reaction with prolonged " bereavement     309.0/F43.21  · Situational depression     309.0/F43.21  · Caregiver stress     V61.49/Z63.6  · RLS (restless legs syndrome)     333.94/G25.81  · Hiatal hernia     553.3/K44.9  · Pre-diabetes     790.29/R73.03  · Weight gain     783.1/R63.5    Problems Reconciled  Plan  · Orders  o B12 Folate levels (B12FO) - 285.9/D64.9 - 03/03/2020  o CBC with Auto Diff [facility] (02664) - 285.9/D64.9 - 03/03/2020  o Iron panel (iron, TIBC, transferrin saturation) (90367, 38150, 68921) - 285.9/D64.9 - 03/03/2020  o CMP [facility] (47715) - 780.79/R53.83 - 03/03/2020  o Thyroid Profile (THYII) - 780.79/R53.83 - 03/03/2020  o Hgb A1c [facility] (41622) - 780.79/R53.83, 790.29/R73.03, 783.1/R63.5 - 03/03/2020  o ACO-18: Positive screen for clinical depression using a standardized tool and a follow-up plan documented () - - 03/18/2020   15  o ACO-39: Current medications updated and reviewed () - - 03/03/2020  o Ferritin ser/plas (60117) - 780.79/R53.83, 285.9/D64.9 - 03/03/2020  · Medications  o Zoloft 50 mg oral tablet   SIG: take 1/2 tab daily x 4 days then start 1 tablet (50 mg) by oral route once daily thereafter   DISP: (30) tablets with 0 refills  Prescribed on 03/03/2020     o Ventolin HFA 90 mcg/actuation inhalation HFA aerosol inhaler   SIG: inhale 1 - 2 puffs (90 - 180 mcg) by inhalation route every 4-6 hours as needed for 30 days   DISP: (1) 8 gm canister with 2 refills  Prescribed on 03/03/2020     o Singulair 10 mg oral tablet   SIG: take 1 tablet (10 mg) by oral route once daily in the evening for 30 days   DISP: (30) tablets with 0 refills  Prescribed on 03/03/2020     o omeprazole 40 mg oral capsule,delayed release(DR/EC)   SIG: take 1 capsule (40 mg) by oral route once daily before a meal for 30 days   DISP: (30) capsules with 5 refills  Adjusted on 03/03/2020     o ropinirole 0.5 mg oral tablet   SIG: take 1 tablet (0.5 mg) by oral route 1-3 hours before bedtime for 30 days   DISP: (30) tablets with 5  refills  Adjusted on 03/03/2020     o Medications have been Reconciled  o Transition of Care or Provider Policy  · Instructions  o Maintain a healthy weight. Avoid tight fitting clothes. Avoid fried, fatty foods, tomato sauce, chocolate, mint, garlic, onion, alcohol. caffeine. Eat smaller meals, dont lie down after a meal, dont smoke. Elevate the head of your bed 6-9 inches.  o Handouts were given to patient: gave the list of names and numbers of psych to pt   o Take all medications as prescribed/directed.  o Patient was educated/instructed on their diagnosis, treatment and medications prior to discharge from the clinic today.  o Patient instructed to seek medical attention urgently for new or worsening symptoms.  o Call the office with any concerns or questions.  o Risks, benefits, and alternatives were discussed with the patient. The patient is aware of risks associated with: SSRI use--and we will wait on the Chantix until we can get the depression under control   o Chronic conditions reviewed and taken into consideration for today's treatment plan.  o Electronically Identified Patient Education Materials Provided Electronically  · Disposition  o Call or Return if symptoms worsen or persist.  o Return Visit Request in/on 3 weeks +/- 2 days (7393).     then at the 3 week F/U we will further discuss mammo and things like that             Electronically Signed by: PHILLIP Jones -Author on March 18, 2020 02:28:22 PM

## 2021-05-07 NOTE — PROGRESS NOTES
Progress Note      Patient Name: Hattie Pringle   Patient ID: [id number]   Sex: Female   YOB: 1975        Visit Date: January 21, 2021    Provider: PHILLIP Hewitt   Location: [facility]   Location Address: [address] [place] [address] [place]  [address]   Location Phone: [phone]          History Of Present Illness  Video Conferencing Visit  Hattie Pringle is a 45 year old /White,  or  female who is presenting for evaluation via video conferencing via [employer]. Verbal consent obtained before beginning visit.   The following staff were present during this visit: PHILLIP Hewitt   TELEHEALTH TELEPHONE VISIT  Chief Complaint: COVID (+)   Past Medical History/Overview of Patient Symptoms     Father tested (+) for COVID on Monday - she had to get tested for exposure on Tuesday - came back (+) - started to actually have symptoms starting on Wednesday - no known fever currently, but has had fever over the past 2 weeks d/t battling a UTI - has had 2 ER visits for it. She is very fatigued. She has been having sxs of sneezing, dry cough, nasal congestion and discharge since Wednesday. No nausea, vomiting, diarrhea. Has had back pain, but thinks that is more related to the UTI than COVID.     Reports being over-weight - 250 pounds on her scale today - hx of asthma as well. Would like to get monoclonal antibody infusion if eligible.       Past Medical History  Disease Name Date Onset Notes   Anemia --  --    Asthma --  --    Chronic GERD --  --    Depression (emotion) --  --    Diverticulosis --  --    Hiatal hernia --  --    Limited joint range of motion (ROM) --  --    Menorrhagia --  --    MRSA (methicillin resistant Staphylococcus aureus) --  --    Nerve Damage --  --          Past Surgical History  Procedure Name Date Notes   Cholecstectomy --  --    Foot surgery --  --    Partial Hysterectomy --  --    Tubal ligation --  --          Medication  List  Name Date Started Instructions   cyclobenzaprine 10 mg oral tablet 10/26/2018 take 1/2 to 1 tablet (5-10 mg) by oral route 3 times per day as needed for headache (to use with Ibuprofen)   Macrobid 100 mg oral capsule 11/24/2020 take 1 capsule (100 mg) by oral route every 12 hours with food for 5 days   montelukast 10 mg oral tablet 03/03/2020 TAKE 1 TABLET(10 MG) BY MOUTH EVERY DAY IN THE EVENING   omeprazole 40 mg oral capsule,delayed release(DR/EC) 03/03/2020 take 1 capsule (40 mg) by oral route once daily before a meal for 30 days   ropinirole 0.5 mg oral tablet 03/03/2020 take 1 tablet (0.5 mg) by oral route 1-3 hours before bedtime for 30 days   Ventolin HFA 90 mcg/actuation inhalation HFA aerosol inhaler 03/03/2020 inhale 1 - 2 puffs (90 - 180 mcg) by inhalation route every 4-6 hours as needed for 30 days         Allergy List  Allergen Name Date Reaction Notes   Neurontin --  --  --    Tussionex --  --  --          Family Medical History  Disease Name Relative/Age Notes   Chronic Obstructive Pulmonary Disease  --    Arthritis, Rheumatoid  --    Breast Neoplasm, Malignant  --    Cardiac Conduction Disorder  --    Hypertension  --    Diabetes mellitus, type II  --          Social History  Finding Status Start/Stop Quantity Notes   Former smoker --  --/-- --  --    Tobacco Former --/-- --  --          Immunizations  NameDate Admin Mfg Trade Name Lot Number Route Inj VIS Given VIS Publication   [id number]/04/2019 NE Not Entered  NE NE 09/05/2019    Comments:          Review of Systems  · Constitutional  o Admits  o : fatigue  o Denies  o : fever, chills  · Eyes  o Denies  o : discharge from eye, blurred or double vision  · HENT  o Admits  o : headaches, nasal congestion, nasal discharge, sneezing  o Denies  o : sore throat, ear pain  · Cardiovascular  o Denies  o : chest pain, dyspnea on exertion, lower extremity edema, palpitations (fast, fluttering, or skipping beats)  · Respiratory  o Admits  o : cough,  "asthma   o Denies  o : shortness of breath, wheezing, abnormal sputum production  · Gastrointestinal  o Denies  o : nausea, vomiting, diarrhea, abdominal pain  · Genitourinary  o Admits  o : urgency, frequency, dysuria, incontinence  · Integument  o Denies  o : rash  · Neurologic  o Denies  o : lightheaded or dizzy  · Musculoskeletal  o Admits  o : back pain      Vitals  Date Time BP Position Site L\R Cuff Size HR RR TEMP (F) WT  HT  BMI kg/m2 BSA m2 O2 Sat FR L/min FiO2 HC       01/21/2021 09:45 AM         250lbs 0oz 5'  5\" 41.6 2.28             Physical Examination  · Constitutional  o Appearance  o : obese, well developed, alert, in no acute distress, well-tended appearance, no obvious deformities present  · Head and Face  o HEENT  o : Unremarkable  · Respiratory  o Respiratory Effort  o : breathing unlabored, no accessory muscle use  · Musculoskeletal  o General  o :   § General Musculoskeletal  § : Muscle tone, strength, and development grossly normal.  · Skin and Subcutaneous Tissue  o General Inspection  o : normal tone  · Neurologic  o Mental Status Examination  o :   § Orientation  § : grossly oriented to person, place and time  · Psychiatric  o General  o : normal affect and calm          Assessment  · Asthma     493.90/J45.909  · Cough     786.2/R05  · COVID-19     079.89/U07.1  · BMI 40.0-44.9, adult     V85.41/Z68.41      Plan  · Orders  o ACO-39: Current medications updated and reviewed (, 1159F) - - 01/21/2021  · Medications  o Medications have been Reconciled  o Transition of Care or Provider Policy  · Instructions  o Plan Of Care: Will attempt to arrange for monoclonal antibody infusion            Electronically Signed by: PHILLIP Hewitt -Author on January 21, 2021 09:51:39 AM  "

## 2021-05-07 NOTE — PROGRESS NOTES
"   Progress Note      Patient Name: Hattie Pringle   Patient ID: [id number]   Sex: Female   YOB: 1975        Visit Date: October 9, 2018    Provider: ROMEO Jones   Location: [facility]   Location Address: [address] [place] [address]  [id number]   Location Phone: [phone]          Chief Complaint     here to discuss last months labs.       History Of Present Illness  Hattie Pringle is a 43 year old /White,  or  female who presents for evaluation and treatment of:      pt was trying the Wellbutrin for the depression and tiredness and states had to stop this \"didn't feel like herself and felt funny and stopped it\"--    then did not realize there were 2 more refills on the Vit D she still needs to complete--    and still having the fatigue--just got back from vacation and feels like helped with the depression--and feels revitalized right now-    and pt would like to try something else \"like Zoloft\"--       Past Medical History  Disease Name Date Onset Notes   Anemia --  --    Asthma --  --    Chronic GERD --  --    Diverticulosis --  --    Hiatal hernia --  --    Menorrhagia --  --    MRSA (methicillin resistant Staphylococcus aureus) --  --          Past Surgical History  Procedure Name Date Notes   Cholecstectomy --  --    Foot surgery --  --    Partial Hysterectomy --  --    Tubal ligation --  --          Medication List  Name Date Started Instructions   omeprazole 40 mg oral capsule,delayed release(DR/EC) 08/30/2018 take 1 capsule (40 mg) by oral route once daily before a meal for 30 days   Requip 0.5 mg oral tablet 08/30/2018 take 1 tablet (0.5 mg) by oral route 1-3 hours before bedtime for 30 days   Vitamin D2 50,000 unit oral capsule 09/01/2018 take 1 capsule (50,000 unit) by oral route once weekly for 90 days   Wellbutrin  mg oral tablet extended release 24 hr 08/30/2018 take 1 tablet (150 mg) by oral route once daily for 30 " days         Allergy List  Allergen Name Date Reaction Notes   Neurontin --  --  --    Tussionex --  --  --          Family Medical History  Disease Name Relative/Age Notes   Arthritis, Rheumatoid / --    Breast Neoplasm, Malignant / --    Cardiac Conduction Disorder / --    Chronic Obstructive Pulmonary Disease / --    Diabetes mellitus, type II / --    Hypertension / --          Social History  Finding Status Start/Stop Quantity Notes   Former smoker --  --/-- --  --    Tobacco Former --/-- --  --          Review of Systems  · Constitutional  o Denies  o : fever  · HENT  o Denies  o : vertigo, recent head injury  · Cardiovascular  o Denies  o : chest pain, irregular heart beats  · Respiratory  o Denies  o : shortness of breath, productive cough  · Psychiatric  o Admits  o : depression, difficulty sleeping  o Denies  o : suicidal ideation, homicidal ideation  · Allergic-Immunologic  o Denies  o : frequent illnesses      Vitals  Date Time BP Position Site L\R Cuff Size HR RR TEMP(F) WT  HT  BMI kg/m2 BSA m2 O2 Sat HC       10/09/2018 09:05 /84 Sitting    107 - R  97.8 214lbs 1oz    98 %           Physical Examination  · Constitutional  o Appearance  o : well developed, well-nourished, in no acute distress  · Head and Face  o HEENT  o : Unremarkable  · Eyes  o Conjunctivae  o : conjunctivae normal  · Neck  o Inspection/Palpation  o : supple  o Thyroid  o : no thyromegaly  · Respiratory  o Respiratory Effort  o : breathing unlabored  · Cardiovascular  o Peripheral Vascular System  o :   § Extremities  § : no edema  · Lymphatic  o Neck  o : no lymphadenopathy present  · Musculoskeletal  o General  o :   § General Musculoskeletal  § : No joint swelling or deformity., Muscle tone, strength, and development grossly normal.  · Skin and Subcutaneous Tissue  o General Inspection  o : skin turgor normal, texture normal  · Neurologic  o Gait and Station  o :   § Gait Screening  § : normal gait  · Psychiatric  o Mood and  Affect  o : mood normal, affect appropriate          Assessment  · Fatigue     780.79/R53.83  · RLS (restless legs syndrome)     333.94/G25.81  · Snoring     786.09/R06.83  · Insomnia     780.52/G47.00  · Depression     311/F32.9      Plan  · Orders  o ACO-39: Current medications updated and reviewed () - - 10/09/2018  o [facility] Consultation (SLEEP) - 333.94/G25.81, 786.09/R06.83, 780.52/G47.00, 780.79/R53.83 - 10/09/2018  · Medications  o Zoloft 50 mg oral tablet   SIG: take 1 tablet (50 mg) by oral route once daily for 30 days   DISP: (30) tablets with 1 refills  Prescribed on 10/09/2018     o Wellbutrin  mg oral tablet extended release 24 hr   SIG: take 1 tablet (150 mg) by oral route once daily for 30 days   DISP: (30) tablets with 1 refills  Discontinued on 10/09/2018     · Instructions  o Patient was educated/instructed on their diagnosis, treatment and medications prior to discharge from the clinic today.  o Call the office with any concerns or questions.  o Risks, benefits, and alternatives were discussed with the patient. The patient is aware of risks associated with: SSRI use   o Chronic conditions reviewed and taken into consideration for today's treatment plan.  · Disposition  o Call or Return if symptoms worsen or persist.  o Return Visit Request in/on 2 months +/- 2 days (9190).            Electronically Signed by: ROMEO Jones -Author on October 9, 2018 09:37:16 AM

## 2021-05-07 NOTE — PROGRESS NOTES
Progress Note      Patient Name: Hattie Pringle   Patient ID: [id number]   Sex: Female   YOB: 1975        Visit Date: March 18, 2020    Provider: PHILLIP Jones   Location: [facility]   Location Address: [address] [place] [address]   [place]  [address]   Location Phone: [phone]          Chief Complaint     woke up this morning at 330 with chills and the shakes, worsened as day has went. cough, sore thraot, body aches. Also feel like she has a UTI again.    She has not traveled or been around that has traveled.       History Of Present Illness  Hattie Pringle is a 45 year old /White,  or  female who presents for evaluation and treatment of:      pt with sudden onset chills and shaking and went in and sat in a hot tub of water and then very fatigued and then also (+) cough runny nose sore throat    then also may have a UTI again as well-had one 2 weeks ago    no N/V/D       Past Medical History  Disease Name Date Onset Notes   Anemia --  --    Asthma --  --    Chronic GERD --  --    Diverticulosis --  --    Hiatal hernia --  --    Limited joint range of motion (ROM) --  --    Menorrhagia --  --    MRSA (methicillin resistant Staphylococcus aureus) --  --    Nerve Damage --  --          Past Surgical History  Procedure Name Date Notes   Cholecstectomy --  --    Foot surgery --  --    Partial Hysterectomy --  --    Tubal ligation --  --          Medication List  Name Date Started Instructions   cyclobenzaprine 10 mg oral tablet 10/26/2018 take 1/2 to 1 tablet (5-10 mg) by oral route 3 times per day as needed for headache (to use with Ibuprofen)   montelukast 10 mg oral tablet 03/03/2020 TAKE 1 TABLET(10 MG) BY MOUTH EVERY DAY IN THE EVENING   omeprazole 40 mg oral capsule,delayed release(/EC) 03/03/2020 take 1 capsule (40 mg) by oral route once daily before a meal for 30 days   ropinirole 0.5 mg oral tablet 03/03/2020 take 1 tablet (0.5 mg) by  oral route 1-3 hours before bedtime for 30 days   Singulair 10 mg oral tablet 03/03/2020 take 1 tablet (10 mg) by oral route once daily in the evening for 30 days   Ventolin HFA 90 mcg/actuation inhalation HFA aerosol inhaler 03/03/2020 inhale 1 - 2 puffs (90 - 180 mcg) by inhalation route every 4-6 hours as needed for 30 days   Zoloft 50 mg oral tablet 03/03/2020 take 1/2 tab daily x 4 days then start 1 tablet (50 mg) by oral route once daily thereafter         Allergy List  Allergen Name Date Reaction Notes   Neurontin --  --  --    Tussionex --  --  --          Family Medical History  Disease Name Relative/Age Notes   Chronic Obstructive Pulmonary Disease  --    Arthritis, Rheumatoid  --    Breast Neoplasm, Malignant  --    Cardiac Conduction Disorder  --    Hypertension  --    Diabetes mellitus, type II  --          Social History  Finding Status Start/Stop Quantity Notes   Former smoker --  --/-- --  --    Tobacco Former --/-- --  --          Immunizations  NameDate Admin Mfg Trade Name Lot Number Route Inj VIS Given VIS Publication   [id number]/04/2019 NE Not Entered  NE NE 09/05/2019    Comments:          Review of Systems  · Constitutional  o Admits  o : fatigue, fever, headache, chills, body aches  · HENT  o Admits  o : nasal congestion, nasal discharge, postnasal drip, sore throat  o Denies  o : headaches  · Cardiovascular  o Denies  o : chest pain, irregular heart beats  · Respiratory  o Admits  o : dry cough  o Denies  o : shortness of breath, cough, productive cough  · Gastrointestinal  o Denies  o : nausea, vomiting, diarrhea  · Genitourinary  o Admits  o : dysuria  · Integument  o Denies  o : rash  · Neurologic  o Denies  o : altered mental status, seizures, tremors  · Musculoskeletal  o Admits  o : back pain, shoulder pain  · Endocrine  o Denies  o : cold intolerance, heat intolerance  · Psychiatric  o Admits  o : depression  o Denies  o : suicidal ideation, homicidal  "ideation  · Heme-Lymph  o Denies  o : petechiae, lymph node enlargement or tenderness  · Allergic-Immunologic  o Denies  o : frequent illnesses      Vitals  Date Time BP Position Site L\R Cuff Size HR RR TEMP (F) WT  HT  BMI kg/m2 BSA m2 O2 Sat HC       03/18/2020 01:41 /86 Sitting    140 - R  100.5 241lbs 2oz 5'  5\" 40.12 2.24 97 %    03/18/2020 01:58 PM      130 - R                 Physical Examination  · Constitutional  o Appearance  o : well developed, well-nourished, in no acute distress--but looks sick--flushed face and glassy eyes   · Head and Face  o HEENT  o : Unremarkable  · Eyes  o Conjunctivae  o : conjunctivae normal  · Neck  o Inspection/Palpation  o : supple  o Thyroid  o : no thyromegaly  · Respiratory  o Respiratory Effort  o : breathing unlabored  o Auscultation of Lungs  o : clear to auscultation--but pt with a very harsh barking croupy cough-coughs so hard at times and frequently-eyes tear up and she almost gags and vomits--  · Cardiovascular  o Heart  o :   § Auscultation of Heart  § : regular rate and rhythm apical pulse 130  o Peripheral Vascular System  o :   § Extremities  § : no edema  · Gastrointestinal  o Abdominal Examination  o :   § Abdomen  § : no CVA tenderness (+) suprapubic tenderness   · Lymphatic  o Neck  o : no lymphadenopathy present  · Musculoskeletal  o General  o :   § General Musculoskeletal  § : No joint swelling or deformity., Muscle tone, strength, and development grossly normal.  · Skin and Subcutaneous Tissue  o General Inspection  o : skin turgor normal, texture normal  · Neurologic  o Gait and Station  o :   § Gait Screening  § : normal gait  · Psychiatric  o Mood and Affect  o : mood normal, affect appropriate--pt looks ill and is chilling           Assessment  · Flu-like symptoms     780.99/R68.89  · UTI symptoms     788.99/R39.9  · Hematuria     599.70/R31.9  · Leukocytes in urine     791.7/R82.998  · Influenza A     487.1/J10.1  · Croupy " cough     464.4/J05.0      Plan  · Orders  o IOP - Urinalysis without Microscopy (Clinitek) [facility] (19835) - 788.99/R39.9 - 03/18/2020   GLU (-) BILI (-) KET (-) SG 1.025 BLO small pH 6.0 PRO (-) URO 1/0 NIT (-) JENNY moderate   o Urine culture (50396, 95269) - 788.99/R39.9, 599.70/R31.9, 791.7/R82.998 - 03/18/2020  o ACO-39: Current medications updated and reviewed () - - 03/18/2020  o IOP - Influenza A/B Test (94739) - 780.99/R68.89 - 03/18/2020   FLU A POSITIVE FLU B NEG  · Medications  o Tamiflu 75 mg oral capsule   SIG: take 1 capsule (75 mg) by oral route 2 times per day for 5 days   DISP: (10) capsules with 0 refills  Prescribed on 03/18/2020     o Bactrim -160 mg oral tablet   SIG: take 1 tablet by oral route every 12 hours for 7 days   DISP: (14) tablets with 0 refills  Prescribed on 03/18/2020     o Tessalon Perles 100 mg oral capsule   SIG: take 1 capsule (100 mg) by oral route 3 times per day as needed for cough for 10 days   DISP: (30) capsules with 0 refills  Prescribed on 03/18/2020     o codeine-guaifenesin  mg/5 mL oral liquid   SIG: take 5 milliliters by oral route 4 times a day as needed for 5 days cough   DISP: (120) milliliters with 0 refills  Prescribed on 03/18/2020     o Medications have been Reconciled  o Transition of Care or Provider Policy  · Instructions  o Obtained a written consent for NILS query. Discussed the risk and benefits of the use of controlled substances with the patient, including the risk of tolerance and drug dependence. The patient has been counseled on the need to have an exit strategy, including potentially discontinuing the use of controlled substances. NILS has or will be reviewed as soon as it becomes avaliable.  o Take all medications as prescribed/directed.  o Rest. Increase Fluids.  o Patient was educated/instructed on their diagnosis, treatment and medications prior to discharge from the clinic today.  o Patient instructed to seek medical  attention urgently for new or worsening symptoms.  o Call the office with any concerns or questions.  o Risks, benefits, and alternatives were discussed with the patient. The patient is aware of risks associated with: the robitussin with robitussin with codeine use for this severe croupy barking cough   o Chronic conditions reviewed and taken into consideration for today's treatment plan.  · Disposition  o Call or Return if symptoms worsen or persist.     off work rest of the week             Electronically Signed by: PHILLIP Jones -Author on March 18, 2020 02:27:36 PM

## 2021-05-07 NOTE — PROGRESS NOTES
Progress Note      Patient Name: Hattie Pringle   Patient ID: [id number]   Sex: Female   YOB: 1975        Visit Date: October 17, 2019    Provider: PHILLIP Hewitt   Location: [facility]   Location Address: [address] [place] [address] [place]  [address]   Location Phone: [phone]          Chief Complaint     sore throat and cough       History Of Present Illness  Hattie Pringle is a 44 year old /White,  or  female who presents for evaluation and treatment of:      she went to [facility] last week -- was tested for the flu and they told her she had the stomach flu -- given medication for nausea and a muscle relaxer for stiff neck -- She has been coughing for at least a week, if not longer, and her throat is sore -- now more irritated from cough than anything. she is hoarse. No nausea, vomiting, abdominal pain, or diarrhea. No known fever. The cough just won't stop.       Past Medical History  Disease Name Date Onset Notes   Anemia --  --    Asthma --  --    Chronic GERD --  --    Diverticulosis --  --    Hiatal hernia --  --    Limited joint range of motion (ROM) --  --    Menorrhagia --  --    MRSA (methicillin resistant Staphylococcus aureus) --  --    Nerve Damage --  --          Past Surgical History  Procedure Name Date Notes   Cholecstectomy --  --    Foot surgery --  --    Partial Hysterectomy --  --    Tubal ligation --  --          Medication List  Name Date Started Instructions   cyclobenzaprine 10 mg oral tablet 10/26/2018 take 1/2 to 1 tablet (5-10 mg) by oral route 3 times per day as needed for headache (to use with Ibuprofen)   omeprazole 40 mg oral capsule,delayed release(/EC) 08/30/2018 take 1 capsule (40 mg) by oral route once daily before a meal for 30 days   phentermine 37.5 mg oral tablet  take 1 tablet (37.5 mg) by oral route once daily before breakfast   Requip 0.5 mg oral tablet 08/30/2018 take 1 tablet (0.5 mg) by oral  route 1-3 hours before bedtime for 30 days   Zofran 8 mg oral tablet 03/25/2019 take 1 tablet by oral route 3 times a day as needed for 4 days N/V         Allergy List  Allergen Name Date Reaction Notes   Neurontin --  --  --    Tussionex --  --  --        Allergies Reconciled  Family Medical History  Disease Name Relative/Age Notes   Chronic Obstructive Pulmonary Disease  --    Arthritis, Rheumatoid  --    Breast Neoplasm, Malignant  --    Cardiac Conduction Disorder  --    Hypertension  --    Diabetes mellitus, type II  --          Social History  Finding Status Start/Stop Quantity Notes   Former smoker --  --/-- --  --    Tobacco Former --/-- --  --          Immunizations  NameDate Admin Mfg Trade Name Lot Number Route Inj VIS Given VIS Publication   [id number]/04/2019 NE Not Entered  NE NE 09/05/2019    Comments:          Review of Systems  · Constitutional  o Admits  o : fatigue  o Denies  o : fever, chills, body aches  · Eyes  o Denies  o : discharge from eye  · HENT  o Admits  o : headaches, neck stiffness, sore throat, ear fullness  o Denies  o : sinus pain, nasal congestion, nasal discharge, postnasal drip, tinnitus, ear pain  · Cardiovascular  o Denies  o : chest pain, palpitations  · Respiratory  o Admits  o : cough  o Denies  o : shortness of breath, wheezing, abnormal sputum production  · Gastrointestinal  o Denies  o : nausea, vomiting, diarrhea, abdominal pain  · Heme-Lymph  o Admits  o : lymph node enlargement or tenderness      Vitals  Date Time BP Position Site L\R Cuff Size HR RR TEMP (F) WT  HT  BMI kg/m2 BSA m2 O2 Sat        10/17/2019 09:02 /70 Sitting    115 - R  98.2 222lbs 16oz    97 %          Physical Examination  · Constitutional  o Appearance  o : well developed, well-nourished, in no acute distress  · Eyes  o Conjunctivae  o : conjunctivae normal, no exudates present  · Ears, Nose, Mouth and Throat  o Ears  o :   § External Ears  § : auricle appearance normal bilaterally, no  auricle tenderness to palpation present, external auditory canal appearance within normal limits, no discharge present  § Otoscopic Examination  § : tympanic membrane appearance within normal limits bilaterally, no tympanic membrane perforations present, tympanic membrane color normal  § Hearing  § : response to sound normal, no tinnitus  o Nose  o :   § External Nose  § : appearance normal  § Intranasal Exam  § : mucosa within normal limits, sinuses non tender to percussion  o Throat  o :   § Oropharynx  § : no inflammation or lesions present, tonsils within normal limits  · Neck  o Inspection/Palpation  o : supple  · Respiratory  o Respiratory Effort  o : breathing unlabored  o Auscultation of Lungs  o : clear to auscultation -- no wheezing on auscultation -- persistent, deep/bronchial cough  · Cardiovascular  o Heart  o :   § Auscultation of Heart  § : regular rate and rhythm  o Peripheral Vascular System  o :   § Extremities  § : no edema  · Lymphatic  o Neck  o : lymphadenopathy of the left neck -- posterior cervical chain - NTTP, fingertip in size  · Musculoskeletal  o General  o :   § General Musculoskeletal  § : Muscle tone, strength, and development grossly normal.  · Skin and Subcutaneous Tissue  o General Inspection  o : no lesions present, no areas of discoloration, skin turgor normal, texture normal  · Neurologic  o Gait and Station  o :   § Gait Screening  § : normal gait  · Psychiatric  o Mood and Affect  o : mood normal, affect appropriate          Assessment  · Bronchitis, acute     466.0/J20.9  · Lymphadenopathy of head and neck region     785.6/R59.0    Problems Reconciled  Plan  · Orders  o IOP - Rapid Strep (56153) - 785.6/R59.0 - 10/17/2019   STREP IS NEGATIVE   o ACO-39: Current medications updated and reviewed () - - 10/17/2019  · Medications  o Medrol (Randy) 4 mg oral tablets,dose pack   SIG: take as directed for 6 days   DISP: (1) 21 ct dose-pack with 0 refills  Prescribed on 10/17/2019      o azithromycin 250 mg oral tablet   SIG: take 2 tablets (500 mg) by oral route once daily for 1 day then 1 tablet (250 mg) by oral route once daily for 4 days   DISP: (6) tablets with 0 refills  Prescribed on 10/17/2019     o Tessalon Perles 100 mg oral capsule   SIG: take 2 capsules (200 mg) by oral route 3 times per day as needed for cough for 7 days   DISP: (42) capsules with 0 refills  Prescribed on 10/17/2019     o Medications have been Reconciled  o Transition of Care or Provider Policy  · Instructions  o Take all medications as prescribed/directed.  o Patient was educated/instructed on their diagnosis, treatment and medications prior to discharge from the clinic today.  o Patient instructed to seek medical attention urgently for new or worsening symptoms.            Electronically Signed by: PHILLIP Hewitt -Author on October 17, 2019 09:47:09 AM

## 2021-05-07 NOTE — PROGRESS NOTES
Progress Note      Patient Name: Hattie Pringle   Patient ID: [id number]   Sex: Female   YOB: 1975        Visit Date: October 30, 2018    Provider: PHILLIP Hewitt   Location: [facility]   Location Address: [address] [place] [address]  [id number]   Location Phone: [phone]          Chief Complaint     dysuria  feels like she's not empting her bladder       History Of Present Illness  Hattie Pringle is a 43 year old /White,  or  female who presents for evaluation and treatment of:      recurrent dysuria--she is having an ongoing burning sensation when she voids and shortly after--she feels pressure in her lower abdomen and her back aches as well. Her UA in-office on Friday showed trace blood and leukocytes, but her urine culture was negative.     She has chronic constipation that is poorly controlled--remote hx of colonoscopy with polypectomy--done in [place], but no record on chart--she can't remember who/where -- she has Miralax at home, but doesn't use it. She may be alexandro to move her bowels once every 1-2 weeks. She denies any rectal bleeding or blood in her stools. She occasionally gets extreme urgency to defecate--she will break out in a cold sweat and feel flushed.       Past Medical History  Disease Name Date Onset Notes   Anemia --  --    Asthma --  --    Chronic GERD --  --    Diverticulosis --  --    Hiatal hernia --  --    Menorrhagia --  --    MRSA (methicillin resistant Staphylococcus aureus) --  --          Past Surgical History  Procedure Name Date Notes   Cholecstectomy --  --    Foot surgery --  --    Partial Hysterectomy --  --    Tubal ligation --  --          Medication List  Name Date Started Instructions   cyclobenzaprine 10 mg oral tablet 10/26/2018 take 1/2 to 1 tablet (5-10 mg) by oral route 3 times per day as needed for headache (to use with Ibuprofen)   ibuprofen 800 mg oral tablet 10/26/2018 take 1 tablet  (800 mg) by oral route 3 times per day with food for 10 days   omeprazole 40 mg oral capsule,delayed release(DR/EC) 08/30/2018 take 1 capsule (40 mg) by oral route once daily before a meal for 30 days   phentermine 37.5 mg oral tablet  take 1 tablet (37.5 mg) by oral route once daily before breakfast   Requip 0.5 mg oral tablet 08/30/2018 take 1 tablet (0.5 mg) by oral route 1-3 hours before bedtime for 30 days   Vitamin D2 50,000 unit oral capsule 09/01/2018 take 1 capsule (50,000 unit) by oral route once weekly for 90 days   Zoloft 50 mg oral tablet 10/09/2018 take 1 tablet (50 mg) by oral route once daily for 30 days         Allergy List  Allergen Name Date Reaction Notes   Neurontin --  --  --    Tussionex --  --  --          Family Medical History  Disease Name Relative/Age Notes   Arthritis, Rheumatoid / --    Breast Neoplasm, Malignant / --    Cardiac Conduction Disorder / --    Chronic Obstructive Pulmonary Disease / --    Diabetes mellitus, type II / --    Hypertension / --          Social History  Finding Status Start/Stop Quantity Notes   Former smoker --  --/-- --  --    Tobacco Former --/-- --  --          Review of Systems  · Constitutional  o Denies  o : fever, chills  · Cardiovascular  o Denies  o : chest pain, palpitations  · Respiratory  o Denies  o : cough  · Gastrointestinal  o Admits  o : constipation, abdominal pain  o Denies  o : nausea, vomiting, diarrhea, blood in stools, hematochezia, melena  · Genitourinary  o Admits  o : dysuria, incomplete bladder emptying  o Denies  o : urgency, frequency, nocturia, hematuria, urinary retention  · Musculoskeletal  o Admits  o : back pain      Vitals  Date Time BP Position Site L\R Cuff Size HR RR TEMP(F) WT  HT  BMI kg/m2 BSA m2 O2 Sat HC       10/30/2018 10:14 /80 Sitting    103 - R  97.3 212lbs 0oz    98 %           Physical Examination  · Constitutional  o Appearance  o : well developed, well-nourished, in no acute  distress  · Eyes  o Conjunctivae  o : conjunctivae normal, no exudates present  · Neck  o Inspection/Palpation  o : normal appearance, no masses or tenderness, trachea midline  o Thyroid  o : no thyromegaly  · Respiratory  o Respiratory Effort  o : breathing unlabored  o Auscultation of Lungs  o : clear to ascultation  · Cardiovascular  o Heart  o :   § Auscultation of Heart  § : regular rate and rhythm  o Peripheral Vascular System  o :   § Extremities  § : no edema  · Gastrointestinal  o Abdominal Examination  o :   § Abdomen  § : soft, generalized tenderness to palpation, but no rebound tenderness or guarding; bowel sounds +; no CVA tenderness bilaterally  · Lymphatic  o Neck  o : no lymphadenopathy present  · Musculoskeletal  o General  o :   § General Musculoskeletal  § : Muscle tone, strength, and development grossly normal.  · Skin and Subcutaneous Tissue  o General Inspection  o : no lesions present, no areas of discoloration, skin turgor normal, texture normal  · Neurologic  o Gait and Station  o :   § Gait Screening  § : normal gait  · Psychiatric  o Mood and Affect  o : mood normal, affect appropriate              Assessment  · Dysuria     788.1/R30.0  · Incomplete bladder emptying     788.21/R33.9  · Chronic constipation     564.00/K59.09      Plan  · Orders  o IOP - Urinalysis without Microscopy (Clinitek) [facility] (92377) - 788.1/R30.0 - 10/30/2018   glu neg, michael neg, ket neg, sg 1.010, blo neg, ph 7.5, pro neg, uro 1.0, nit neg, viri mod  o Urine culture (54691, 87268) - 788.1/R30.0, 788.21/R33.9 - 10/30/2018  o ACO-39: Current medications updated and reviewed () - - 10/30/2018  · Medications  o ciprofloxacin HCl 250 mg oral tablet   SIG: take 1 tablet (250 mg) by oral route every 12 hours for 3 days   DISP: (6) tablets with 0 refills  Prescribed on 10/30/2018     · Instructions  o Patient was educated/instructed on their diagnosis, treatment and medications prior to discharge from the clinic today.  Her UA today does not show any blood--will still send for culture d/t symptoms and leukocytes, but also discussed with patient the possibility of her chronic constipation contributing to her symptoms. I will treat her UTI-type symptoms with Cipro 250mg BID x 3 days, but also have her start Miralax 17 grams daily, and advised her not to titrate the Miralax dose until after 1 week. I will call her Thursday once urine culture results received. If culture negative and symptoms are persistent, then she may benefit from urology consult. She denies any vaginal discharge or concerns for STD/STI. We have discussed this. She may also need to follow up with GI if we cannot get her constipation under control. Suspect IBS-C vs. CIC.   o Chronic conditions reviewed and taken into consideration for today's treatment plan.  · Disposition  o Call or Return if symptoms worsen or persist.            Electronically Signed by: PHILLIP Hewitt -Author on October 30, 2018 10:46:37 AM

## 2021-05-07 NOTE — PROGRESS NOTES
Progress Note      Patient Name: Hattie Pringle   Patient ID: [id number]   Sex: Female   YOB: 1975        Visit Date: March 25, 2019    Provider: ROMEO Jones   Location: [facility]   Location Address: [address] [place] [address]  [id number]   Location Phone: [phone]          Chief Complaint     PATIENT IS HERE WITH BODY CHILLS, FEVER, NAUSEATED, VOMITING AND DIARRHEA.  THIS ALL STARTER IN THE EARLY MORNING.       History Of Present Illness  Hattie Pringle is a 44 year old /White,  or  female who presents for evaluation and treatment of:      started early this morning stomach ache then NVD set in--then chills and aches--and neck hurts and sore throat-cough as well --all started early this morning---    and pt drove self here today    and her father is sick as well       Past Medical History  Disease Name Date Onset Notes   Anemia --  --    Asthma --  --    Chronic GERD --  --    Diverticulosis --  --    Hiatal hernia --  --    Menorrhagia --  --    MRSA (methicillin resistant Staphylococcus aureus) --  --          Past Surgical History  Procedure Name Date Notes   Cholecstectomy --  --    Foot surgery --  --    Partial Hysterectomy --  --    Tubal ligation --  --          Medication List  Name Date Started Instructions   cyclobenzaprine 10 mg oral tablet 10/26/2018 take 1/2 to 1 tablet (5-10 mg) by oral route 3 times per day as needed for headache (to use with Ibuprofen)   omeprazole 40 mg oral capsule,delayed release(DR/EC) 08/30/2018 take 1 capsule (40 mg) by oral route once daily before a meal for 30 days   phentermine 37.5 mg oral tablet  take 1 tablet (37.5 mg) by oral route once daily before breakfast   Requip 0.5 mg oral tablet 08/30/2018 take 1 tablet (0.5 mg) by oral route 1-3 hours before bedtime for 30 days         Allergy List  Allergen Name Date Reaction Notes   Neurontin --  --  --    Tussionex --  --  --   "        Family Medical History  Disease Name Relative/Age Notes   Chronic Obstructive Pulmonary Disease  --    Arthritis, Rheumatoid  --    Breast Neoplasm, Malignant  --    Cardiac Conduction Disorder  --    Hypertension  --    Diabetes mellitus, type II  --          Social History  Finding Status Start/Stop Quantity Notes   Former smoker --  --/-- --  --    Tobacco Former --/-- --  --          Review of Systems  · Constitutional  o Admits  o : fatigue, fever, headache, chills, body aches  · HENT  o Admits  o : headaches, sore throat  o Denies  o : nasal congestion, nasal discharge, postnasal drip  · Cardiovascular  o Denies  o : chest pain, irregular heart beats  · Respiratory  o Admits  o : cough, dry cough  o Denies  o : productive cough  · Gastrointestinal  o Admits  o : nausea, vomiting, diarrhea, additional gastrointestinal symptoms except as noted in the HPI  · Genitourinary  o Denies  o : dysuria  · Integument  o Denies  o : rash  · Heme-Lymph  o Denies  o : petechiae, lymph node enlargement or tenderness  · Allergic-Immunologic  o Denies  o : frequent illnesses      Vitals  Date Time BP Position Site L\R Cuff Size HR RR TEMP (F) WT  HT  BMI kg/m2 BSA m2 O2 Sat        03/25/2019 09:56 /74 Sitting    122 - R  100 218lbs 8oz 5'  5\" 36.36 2.13 97 %          Physical Examination  · Constitutional  o Appearance  o : well developed, well-nourished, in no acute distress  · Head and Face  o HEENT  o : Unremarkable  · Eyes  o Conjunctivae  o : conjunctivae normal  · Neck  o Inspection/Palpation  o : supple  o Thyroid  o : no thyromegaly  · Respiratory  o Respiratory Effort  o : breathing unlabored  o Auscultation of Lungs  o : clear to ascultation  · Cardiovascular  o Heart  o :   § Auscultation of Heart  § : regular rate and rhythm  o Peripheral Vascular System  o :   § Extremities  § : no edema  · Gastrointestinal  o Abdominal Examination  o :   § Abdomen  § : soft nontender (+) hyperactive BS-and slight " generalized sore muscle-from the cough and the vomiting   · Lymphatic  o Neck  o : no lymphadenopathy present  · Musculoskeletal  o General  o :   § General Musculoskeletal  § : No joint swelling or deformity., Muscle tone, strength, and development grossly normal.  · Skin and Subcutaneous Tissue  o General Inspection  o : skin turgor normal, texture normal  · Neurologic  o Gait and Station  o :   § Gait Screening  § : normal gait  · Psychiatric  o Mood and Affect  o : mood normal, affect appropriate          Assessment  · Diarrhea     787.91/R19.7  · Fever and chills     780.60/R50.9  · Flu-like symptoms     780.99/R68.89  · Nausea & vomiting     787.01/R11.2  · Viral illness     079.99/B34.9      Plan  · Orders  o ACO-39: Current medications updated and reviewed () - - 03/25/2019  o IOP - Influenza A/B Test (49184) - 780.60/R50.9, 780.99/R68.89, 787.01/R11.2 - 03/25/2019   (-) Flu A & B  · Medications  o Zofran 8 mg oral tablet   SIG: take 1 tablet by oral route 3 times a day as needed for 4 days N/V   DISP: (12) tablets with 0 refills  Prescribed on 03/25/2019     · Instructions  o BRAT diet, Avoid dairy products.  o Take all medications as prescribed/directed.  o Patient was educated/instructed on their diagnosis, treatment and medications prior to discharge from the clinic today.  o Patient instructed to seek medical attention urgently for new or worsening symptoms.  o Call the office with any concerns or questions.  o Chronic conditions reviewed and taken into consideration for today's treatment plan.  · Disposition  o Call or Return if symptoms worsen or persist.     off work until the Vomiting has subsided--and fever--    then the pt will F/U ASAP should anything persist or worsen             Electronically Signed by: ROMEO Jones -Author on March 25, 2019 10:28:10 AM

## 2021-05-07 NOTE — PROGRESS NOTES
Progress Note      Patient Name: Hattie Pringle   Patient ID: [id number]   Sex: Female   YOB: 1975        Visit Date: December 11, 2018    Provider: ROMEO Jones   Location: [facility]   Location Address: [address] [place] [address]  [id number]   Location Phone: [phone]          Chief Complaint     Follow up on vitamin D       History Of Present Illness  Hattie Pringle is a 43 year old /White,  or  female who presents for evaluation and treatment of:      pt here for the F/U on the Vit D levels--    then Dr Mercedes Peters also wants the ferritin and CBC done for the RLS--and then they will decide if needs the sleep study--and she told pt to double the requip and pt states was a little too strong--so went back to just 1 tab nightly (lightheaded and dizziness and spinning was the SE when doubled up)       Past Medical History  Disease Name Date Onset Notes   Anemia --  --    Asthma --  --    Chronic GERD --  --    Diverticulosis --  --    Hiatal hernia --  --    Menorrhagia --  --    MRSA (methicillin resistant Staphylococcus aureus) --  --          Past Surgical History  Procedure Name Date Notes   Cholecstectomy --  --    Foot surgery --  --    Partial Hysterectomy --  --    Tubal ligation --  --          Medication List  Name Date Started Instructions   cyclobenzaprine 10 mg oral tablet 10/26/2018 take 1/2 to 1 tablet (5-10 mg) by oral route 3 times per day as needed for headache (to use with Ibuprofen)   ibuprofen 800 mg oral tablet 10/26/2018 take 1 tablet (800 mg) by oral route 3 times per day with food for 10 days   omeprazole 40 mg oral capsule,delayed release(DR/EC) 08/30/2018 take 1 capsule (40 mg) by oral route once daily before a meal for 30 days   phentermine 37.5 mg oral tablet  take 1 tablet (37.5 mg) by oral route once daily before breakfast   Requip 0.5 mg oral tablet 08/30/2018 take 1 tablet (0.5 mg) by oral route 1-3  hours before bedtime for 30 days   Vitamin D2 50,000 unit oral capsule 09/01/2018 take 1 capsule (50,000 unit) by oral route once weekly for 90 days         Allergy List  Allergen Name Date Reaction Notes   Neurontin --  --  --    Tussionex --  --  --          Family Medical History  Disease Name Relative/Age Notes   Arthritis, Rheumatoid / --    Breast Neoplasm, Malignant / --    Cardiac Conduction Disorder / --    Chronic Obstructive Pulmonary Disease / --    Diabetes mellitus, type II / --    Hypertension / --          Social History  Finding Status Start/Stop Quantity Notes   Former smoker --  --/-- --  --    Tobacco Former --/-- --  --          Review of Systems  · Constitutional  o Admits  o : fatigue  o Denies  o : fever  · HENT  o Denies  o : hearing loss or ringing, chronic sinus problem, swollen glands in neck  · Cardiovascular  o Denies  o : chest pain, palpitations (fast, fluttering, or skipping beats), swelling (feet, ankles, hands), shortness of breath while walking or lying flat  · Respiratory  o Denies  o : shortness of breath, asthma or wheezing, COPD  · Gastrointestinal  o Denies  o : ulcers, nausea or vomiting  · Neurologic  o Denies  o : lightheaded or dizzy, stroke, headaches  · Musculoskeletal  o Admits  o : joint pain, muscle pain, foot pain  · Endocrine  o Denies  o : thyroid disease, diabetes, heat or cold intolerance, excessive thirst or urination  · Heme-Lymph  o Denies  o : bleeding or bruising tendency, anemia      Vitals  Date Time BP Position Site L\R Cuff Size HR RR TEMP(F) WT  HT  BMI kg/m2 BSA m2 O2 Sat        12/11/2018 08:58 /80 Sitting    116 - R  98 212lbs 9oz    98 %           Physical Examination  · Constitutional  o Appearance  o : well developed, well-nourished, in no acute distress  · Eyes  o Conjunctivae  o : conjunctivae normal no drainage  o Pupils and Irises  o : pupils equal, round, and reactive to light bilaterally  · Neck  o Inspection/Palpation  o :  supple  o Thyroid  o : no thyromegaly  · Respiratory  o Respiratory Effort  o : breathing unlabored  o Auscultation of Lungs  o : clear to ascultation  · Cardiovascular  o Heart  o :   § Auscultation of Heart  § : regular rate and rhythm  · Musculoskeletal  o General  o :   § General Musculoskeletal  § : No joint swelling or deformity., Muscle tone, strength, and development grossly normal.  · Skin and Subcutaneous Tissue  o General Inspection  o : skin turgor normal, texture normal  · Neurologic  o Mental Status Examination  o :   § Orientation  § : grossly oriented to person, place and time  o Gait and Station  o :   § Gait Screening  § : normal gait          Assessment  · Anemia     285.9/D64.9  · Vitamin D deficiency     268.9/E55.9  · RLS (restless legs syndrome)     333.94/G25.81      Plan  · Orders  o Vitamin D Level (98239) - 268.9/E55.9 - 12/11/2018  o ACO-39: Current medications updated and reviewed () - - 12/11/2018  · Instructions  o Recheck Vitamin D blood level in 8-12 weeks.  o Take all medications as prescribed/directed.  o Patient was educated/instructed on their diagnosis, treatment and medications prior to discharge from the clinic today.  o Call the office with any concerns or questions.  o Chronic conditions reviewed and taken into consideration for today's treatment plan.  · Disposition  o Call or Return if symptoms worsen or persist.  o Return Visit Request in/on 2 months +/- 2 days (3493).            Electronically Signed by: ROMEO Jones -Author on December 11, 2018 09:35:03 AM

## 2021-05-07 NOTE — PROGRESS NOTES
Progress Note      Patient Name: Hattie Pringle   Patient ID: [id number]   Sex: Female   YOB: 1975        Visit Date: February 21, 2019    Provider: PHILLIP Proctor   Location: [facility]   Location Address: [address] [place] [address]  [id number]   Location Phone: [phone]          Chief Complaint     woke up dizzy this morning, was dizzy for a few hours and then went away.       History Of Present Illness  Hattie Pringle is a 43 year old /White,  or  female who presents for evaluation and treatment of:      woke up this morning with dizziness - pt states she was fine until about 30minutes after she woke up and then with lying down and walking the room would be spinning - symptoms lasted over 1 hour (until about 20-30 minutes before coming in here @ 12:45pm; now 1:20pm) - still feels slightly lightheaded - vomited this morning - mild headache this morning also - sudden onset of symptoms       Past Medical History  Disease Name Date Onset Notes   Anemia --  --    Asthma --  --    Chronic GERD --  --    Diverticulosis --  --    Hiatal hernia --  --    Menorrhagia --  --    MRSA (methicillin resistant Staphylococcus aureus) --  --          Past Surgical History  Procedure Name Date Notes   Cholecstectomy --  --    Foot surgery --  --    Partial Hysterectomy --  --    Tubal ligation --  --          Medication List  Name Date Started Instructions   cyclobenzaprine 10 mg oral tablet 10/26/2018 take 1/2 to 1 tablet (5-10 mg) by oral route 3 times per day as needed for headache (to use with Ibuprofen)   omeprazole 40 mg oral capsule,delayed release(DR/EC) 08/30/2018 take 1 capsule (40 mg) by oral route once daily before a meal for 30 days   phentermine 37.5 mg oral tablet  take 1 tablet (37.5 mg) by oral route once daily before breakfast   Requip 0.5 mg oral tablet 08/30/2018 take 1 tablet (0.5 mg) by oral route 1-3 hours before bedtime for 30  "days         Allergy List  Allergen Name Date Reaction Notes   Neurontin --  --  --    Tussionex --  --  --          Family Medical History  Disease Name Relative/Age Notes   Arthritis, Rheumatoid / --    Breast Neoplasm, Malignant / --    Cardiac Conduction Disorder / --    Chronic Obstructive Pulmonary Disease / --    Diabetes mellitus, type II / --    Hypertension / --          Social History  Finding Status Start/Stop Quantity Notes   Former smoker --  --/-- --  --    Tobacco Former --/-- --  --          Review of Systems  · Constitutional  o Admits  o : headache  o Denies  o : fever, chills, body aches  · HENT  o Denies  o : nasal congestion, nasal discharge, sore throat  · Cardiovascular  o Admits  o : chest discomfort  o Denies  o : chest pain, rapid heart rate, palpitations  · Respiratory  o Denies  o : wheezing, cough  · Gastrointestinal  o Admits  o : vomiting  · Neurologic  o Admits  o : loss of balance, dizziness      Vitals  Date Time BP Position Site L\R Cuff Size HR RR TEMP(F) WT  HT  BMI kg/m2 BSA m2 O2 Sat        02/21/2019 01:09 /68 Sitting    94 - R  98.4 222lbs 4oz 5'  5\" 36.98 2.15 97 %           Physical Examination  · Constitutional  o Appearance  o : well developed, well-nourished, in no acute distress  · Eyes  o Conjunctivae  o : conjunctivae normal  o Pupils and Irises  o : pupils equal and round, pupils reactive to light bilaterally  · Neck  o Inspection/Palpation  o : supple  o Thyroid  o : no thyromegaly  · Respiratory  o Respiratory Effort  o : breathing unlabored  o Auscultation of Lungs  o : clear to ascultation  · Cardiovascular  o Heart  o :   § Auscultation of Heart  § : regular rate and rhythm  o Peripheral Vascular System  o :   § Extremities  § : no edema  · Lymphatic  o Neck  o : no lymphadenopathy present  · Musculoskeletal  o General  o :   § General Musculoskeletal  § : No joint swelling or deformity. Muscle tone, strength, and development grossly normal.  · Skin and " Subcutaneous Tissue  o General Inspection  o : NL tone  · Neurologic  o Gait and Station  o :   § Gait Screening  § : normal gait  · Psychiatric  o Mood and Affect  o : mood normal, affect appropriate              Assessment  · Dizziness           Plan  · Orders  o CBC with Auto Diff [facility] (74907) -  - 2019  o CMP [facility] (99786) -  - 2019  o Thyroid Profile (THYII) -  - 2019  o EKG (Recording and Interpretation) [facility] (Done and read at [facility]) (06072) -  - 2019   Sinus rhythm  o ACO-39: Current medications updated and reviewed () - - 2019  o Orthostatic Blood Pressure Check (ORTBP) -  - 2019   Lyin/78 HR: 74 Sittin/76 HR: 91 Standin/70 HR: 102  · Instructions  o Patient was educated/instructed on their diagnosis, treatment and medications prior to discharge from the clinic today.  o Patient instructed to seek medical attention urgently for new or worsening symptoms.  o Will follow up pending labs.   · Disposition  o Follow up as needed.            Electronically Signed by: PHILLIP Proctor -Author on 2019 05:48:31 PM

## 2021-05-07 NOTE — PROGRESS NOTES
Progress Note      Patient Name: Hattie Pringle   Patient ID: [id number]   Sex: Female   YOB: 1975        Visit Date: January 15, 2019    Provider: Lane Chamberlain MD   Location: [facility]   Location Address: [address] [place] [address]  [id number]   Location Phone: [phone]          Chief Complaint     PATIENT IS HERE WITH EARS HURTS, NECK HURTING, CHILLS, FREEZING  AND JUST DOESN'T FEEL GOOD.       History Of Present Illness  Hattie Pringle is a 43 year old /White,  or  female who presents for evaluation and treatment of:      cold symptoms x 1 day- sudden onset- worsening symptoms- otc meds not helping       Past Medical History  Disease Name Date Onset Notes   Anemia --  --    Asthma --  --    Chronic GERD --  --    Diverticulosis --  --    Hiatal hernia --  --    Menorrhagia --  --    MRSA (methicillin resistant Staphylococcus aureus) --  --          Past Surgical History  Procedure Name Date Notes   Cholecstectomy --  --    Foot surgery --  --    Partial Hysterectomy --  --    Tubal ligation --  --          Medication List  Name Date Started Instructions   cyclobenzaprine 10 mg oral tablet 10/26/2018 take 1/2 to 1 tablet (5-10 mg) by oral route 3 times per day as needed for headache (to use with Ibuprofen)   ibuprofen 800 mg oral tablet 10/26/2018 take 1 tablet (800 mg) by oral route 3 times per day with food for 10 days   omeprazole 40 mg oral capsule,delayed release(DR/EC) 08/30/2018 take 1 capsule (40 mg) by oral route once daily before a meal for 30 days   phentermine 37.5 mg oral tablet  take 1 tablet (37.5 mg) by oral route once daily before breakfast   Requip 0.5 mg oral tablet 08/30/2018 take 1 tablet (0.5 mg) by oral route 1-3 hours before bedtime for 30 days   Vitamin D2 50,000 unit oral capsule 12/17/2018 take 1 capsule (50,000 unit) by oral route once weekly for 30 days         Allergy List  Allergen Name Date Reaction Notes  "  Neurontin --  --  --    Tussionex --  --  --          Family Medical History  Disease Name Relative/Age Notes   Arthritis, Rheumatoid / --    Breast Neoplasm, Malignant / --    Cardiac Conduction Disorder / --    Chronic Obstructive Pulmonary Disease / --    Diabetes mellitus, type II / --    Hypertension / --          Social History  Finding Status Start/Stop Quantity Notes   Former smoker --  --/-- --  --    Tobacco Former --/-- --  --          Review of Systems  · Constitutional  o Admits  o : fever, chills, body aches  o Denies  o : fatigue  · HENT  o Admits  o : nasal congestion, nasal discharge, sore throat  · Cardiovascular  o Denies  o : chest pain, palpitations  · Respiratory  o Denies  o : shortness of breath, cough  · Gastrointestinal  o Admits  o : nausea  o Denies  o : vomiting, diarrhea      Vitals  Date Time BP Position Site L\R Cuff Size HR RR TEMP(F) WT  HT  BMI kg/m2 BSA m2 O2 Sat        01/15/2019 06:57 /74 Sitting    105 - R  98.2 218lbs 6oz 5'  5\" 36.34 2.13 97 %           Physical Examination  · Constitutional  o Appearance  o : well developed, well-nourished, in no acute distress  · Head and Face  o HEENT  o : erythema of throat, uvula midline, throat open, no abscesses seen, MMM  · Respiratory  o Respiratory Effort  o : breathing unlabored  o Auscultation of Lungs  o : clear to ascultation  · Cardiovascular  o Heart  o :   § Auscultation of Heart  § : regular rate and rhythm  o Peripheral Vascular System  o :   § Extremities  § : no edema  · Gastrointestinal  o Abdomen  o : soft, non-tender, non-distended, + bowel sounds, no hepatosplenomegaly, no masses palpated  · Musculoskeletal  o General  o :   § General Musculoskeletal  § : No joint swelling or deformity., Muscle tone, strength, and development grossly normal.  · Neurologic  o Gait and Station  o :   § Gait Screening  § : normal gait  · Psychiatric  o Mood and Affect  o : mood normal, affect " appropriate          Assessment  · Fever and chills     780.60/R50.9  · URI (upper respiratory infection)     465.9/J06.9  · Viral exanthem     057.9/B09      Plan  · Orders  o ACO-39: Current medications updated and reviewed () - - 01/15/2019  o IOP - Influenza A/B Test (04830) - 780.60/R50.9 - 01/15/2019   flu a negative flu b negative  · Medications  o Tamiflu 75 mg oral capsule   SIG: take 1 capsule (75 mg) by oral route 2 times per day for 5 days   DISP: (10) capsules with 0 refills  Prescribed on 01/15/2019     o Zofran 4 mg oral tablet   SIG: take 1 tablet by oral route 4 times a day as needed for 7 days nausea   DISP: (28) tablets with 0 refills  Prescribed on 01/15/2019     · Instructions  o Patient was educated/instructed on their diagnosis, treatment and medications prior to discharge from the clinic today.            Electronically Signed by: Lane Chamberlain MD -Author on January 15, 2019 07:34:34 PM

## 2021-05-07 NOTE — PROGRESS NOTES
"   Progress Note      Patient Name: Hattie Pringle   Patient ID: [id number]   Sex: Female   YOB: 1975        Visit Date: October 26, 2018    Provider: PHILLIP Hewitt   Location: [facility]   Location Address: [address] [place] [address]  [id number]   Location Phone: [phone]          Chief Complaint     she woke with a severe headache and vomited, after vomiting she felt better, then the headache returned and she vomited again       History Of Present Illness  Hattie Pringle is a 43 year old /White,  or  female who presents for evaluation and treatment of:      migraine--    She woke up this morning with a \"migraine\"--she doesn't typically have headaches--she vomited--the headache improved, but then it came back and she vomited again. Her throat is hurting now after vomiting. She isn't sure if she is dehydrated or not. She isn't drinking water like she should be--she has drank some chicken broth. Last night she hurt over the right shoulder/shoulder blade region and her boyfriend put icy hot on it. The headache was in the back of her head/neck region.       Past Medical History  Disease Name Date Onset Notes   Anemia --  --    Asthma --  --    Chronic GERD --  --    Diverticulosis --  --    Hiatal hernia --  --    Menorrhagia --  --    MRSA (methicillin resistant Staphylococcus aureus) --  --          Past Surgical History  Procedure Name Date Notes   Cholecstectomy --  --    Foot surgery --  --    Partial Hysterectomy --  --    Tubal ligation --  --          Medication List  Name Date Started Instructions   omeprazole 40 mg oral capsule,delayed release(DR/EC) 08/30/2018 take 1 capsule (40 mg) by oral route once daily before a meal for 30 days   phentermine 37.5 mg oral tablet  take 1 tablet (37.5 mg) by oral route once daily before breakfast   Requip 0.5 mg oral tablet 08/30/2018 take 1 tablet (0.5 mg) by oral route 1-3 hours before " "bedtime for 30 days   Vitamin D2 50,000 unit oral capsule 09/01/2018 take 1 capsule (50,000 unit) by oral route once weekly for 90 days   Zoloft 50 mg oral tablet 10/09/2018 take 1 tablet (50 mg) by oral route once daily for 30 days         Allergy List  Allergen Name Date Reaction Notes   Neurontin --  --  --    Tussionex --  --  --          Family Medical History  Disease Name Relative/Age Notes   Arthritis, Rheumatoid / --    Breast Neoplasm, Malignant / --    Cardiac Conduction Disorder / --    Chronic Obstructive Pulmonary Disease / --    Diabetes mellitus, type II / --    Hypertension / --          Social History  Finding Status Start/Stop Quantity Notes   Former smoker --  --/-- --  --    Tobacco Former --/-- --  --          Review of Systems  · Constitutional  o Denies  o : fatigue, fever, chills  · Eyes  o Admits  o : eye discomfort--with headache/sensitive to light  o Denies  o : changes in vision  · HENT  o Admits  o : headaches, sore throat  o Denies  o : sinus pain, nasal congestion, nasal discharge, postnasal drip, tinnitus, ear pain, ear fullness  · Cardiovascular  o Denies  o : chest pain, palpitations  · Respiratory  o Admits  o : cough  o Denies  o : shortness of breath, wheezing, abnormal sputum production  · Gastrointestinal  o Admits  o : vomiting  o Denies  o : nausea, diarrhea, abdominal pain  · Genitourinary  o Admits  o : change in urine color  o Denies  o : urgency, frequency, dysuria  · Integument  o Denies  o : pigmentation changes  · Neurologic  o Denies  o : dizziness      Vitals  Date Time BP Position Site L\R Cuff Size HR RR TEMP(F) WT  HT  BMI kg/m2 BSA m2 O2 Sat        10/26/2018 04:38 /80 Sitting    110 - R  97.6 210lbs 4oz 5'  5\" 34.99 2.09 98 %           Physical Examination  · Constitutional  o Appearance  o : well developed, well-nourished, in no acute distress  · Eyes  o Conjunctivae  o : conjunctivae normal, no exudates present  o Pupils and Irises  o : pupils equal " and round, pupils reactive to light bilaterally  · Ears, Nose, Mouth and Throat  o Ears  o :   § External Ears  § : auricle appearance normal bilaterally, no auricle tenderness to palpation present, external auditory canal appearance within normal limits  § Otoscopic Examination  § : tympanic membrane appearance within normal limits bilaterally  § Hearing  § : response to sound normal, no tinnitus  o Nose  o :   § External Nose  § : appearance normal  § Intranasal Exam  § : mucosa within normal limits, sinuses non tender to percussion  o Throat  o :   § Oropharynx  § : no inflammation or lesions present, tonsils within normal limits  · Neck  o Inspection/Palpation  o : normal appearance, no masses or tenderness, trachea midline  o Thyroid  o : no thyromegaly  · Respiratory  o Respiratory Effort  o : breathing unlabored  o Auscultation of Lungs  o : clear to ascultation  · Cardiovascular  o Heart  o :   § Auscultation of Heart  § : regular rate and rhythm  o Peripheral Vascular System  o :   § Extremities  § : no edema  · Gastrointestinal  o Abdominal Examination  o :   § Abdomen  § : soft, non-tender, non-distended, bowel sounds +  · Lymphatic  o Neck  o : no lymphadenopathy present  · Musculoskeletal  o General  o :   § General Musculoskeletal  § : Muscle tone, strength, and development grossly normal. She is TTP over the right trapezius and upper back on the right. TTP on the lower right neck as well. ROM is grossly intact.  · Skin and Subcutaneous Tissue  o General Inspection  o : no lesions present, no areas of discoloration, skin turgor normal, texture normal  · Neurologic  o Gait and Station  o :   § Gait Screening  § : normal gait  · Psychiatric  o Mood and Affect  o : mood normal, affect appropriate              Assessment  · Dark urine     791.9/R82.99  · Tension type headache     339.10/G44.209  · Abnormal urinalysis     791.9/R82.90      Plan  · Orders  o IOP - Urinalysis without Microscopy (Clinitek) [facility]  (47095) - 791.9/R82.99 - 10/26/2018   GLU-NEG BHAVIN-SMALL KET-TRACE SG-1.025 BLO-TRACE pH-6.5 PRO-30 URO-4.0 NIT-NEG JENNY-LARGE  o Urine culture (04299, 26282) - 791.9/R82.99, 791.9/R82.90 - 10/26/2018  o ACO-39: Current medications updated and reviewed () - - 10/26/2018  · Medications  o ibuprofen 800 mg oral tablet   SIG: take 1 tablet (800 mg) by oral route 3 times per day with food for 10 days   DISP: (30) tablets with 0 refills  Prescribed on 10/26/2018     o cyclobenzaprine 10 mg oral tablet   SIG: take 1/2 to 1 tablet (5-10 mg) by oral route 3 times per day as needed for headache (to use with Ibuprofen)   DISP: (30) tablets with 0 refills  Prescribed on 10/26/2018     · Instructions  o Take all medications as prescribed/directed.  o Rest. Increase Fluids.  o Patient was educated/instructed on their diagnosis, treatment and medications prior to discharge from the clinic today. Will send urine for culture--lysed blood and she is s/p hysterectomy--will call for results on Monday. Trial of IBU 800mg with Flexeril. Follow up if no improvement.   · Disposition  o Call or Return if symptoms worsen or persist.            Electronically Signed by: PHILLIP Hewitt -Author on October 26, 2018 05:10:34 PM

## 2021-05-07 NOTE — PROGRESS NOTES
Progress Note      Patient Name: Hattie Pringle   Patient ID: [id number]   Sex: Female   YOB: 1975        Visit Date: August 30, 2018    Provider: ROMEO Jones   Location: [facility]   Location Address: [address] [place] [address]  [id number]   Location Phone: [phone]          Chief Complaint     PATIENT IS HERE BECAUSE SHE HAS NO SEX DRIVE, DOESN'T CARE WHAT SHE LOOKS LIKE, VERY TIRED ALWAYS, NO ENERGY, VERY ARENAS AND HER STRESS LEVEL IS SO HIGH.  SHE THINKS MAYBE MENOPAUSE AND SHE IS GAINING WEIGHT.       History Of Present Illness  Hattie Pringle is a 43 year old /White,  or  female who presents for evaluation and treatment of:      pt wondering if going through menopause--    pt had a partial hysterectomy 2 yrs ago--not cancer--    and pt has her ovaries--    feels arenas and gaining weight since GB surgery-    no hot flashes no night sweats--    no immediate family with breast cancer--MGM did have breast cancer-Dx'd at age early 70's     no sex drive    and difficulty sleeping--takes an OTC sleep aid ---Tylenol simple sleepy     pt states watching what I'm eating--fruits veggies salads--and very little pasta, potato and small bread-maybe once monthly --drinks water--    walking goes to the gym--    pt states was in the 189# prior to GB surgery and then now  214# today     can eat ginza or eat a big mac--and will go to the bathroom good then--BM's     and neck and bone aches     (+) snores--and went a couple yrs ago and didn't have the sleep apnea bad enough to do the CPAP and then Dx'd her with RLS     and forgetfulness--misplaces her keys often     and needs the RLS med lowest dose of this--needs refills    just saw pulmonary  they refilled the inhalers-and nebulizer--albuterol--Ventolin--asthma    GERD med omeprazole--       Past Medical History  Disease Name Date Onset Notes   Anemia --  --    Asthma --  --    Chronic GERD  --  --    Diverticulosis --  --    Hiatal hernia --  --    Menorrhagia --  --    MRSA (methicillin resistant Staphylococcus aureus) --  --          Past Surgical History  Procedure Name Date Notes   Foot surgery --  --    Tubal ligation --  --          Medication List  Name Date Started Instructions   Macrobid 100 mg oral capsule 08/15/2018 take 1 capsule (100 mg) by oral route every 12 hours with food for 5 days   Pyridium 200 mg oral tablet 08/15/2018 take 1 tablet (200 mg) by oral route 3 times per day after meals for 2 days as needed         Allergy List  Allergen Name Date Reaction Notes   Neurontin --  --  --    Tussionex --  --  --          Family Medical History  Disease Name Relative/Age Notes   Arthritis, Rheumatoid / --    Breast Neoplasm, Malignant / --    Cardiac Conduction Disorder / --    Chronic Obstructive Pulmonary Disease / --    Diabetes mellitus, type II / --    Hypertension / --          Social History  Finding Status Start/Stop Quantity Notes   Former smoker --  --/-- --  --    Tobacco Former --/-- --  --          Review of Systems  · Constitutional  o Admits  o : fatigue, weight gain  o Denies  o : fever  · HENT  o Denies  o : vertigo, recent head injury  · Cardiovascular  o Denies  o : chest pain, irregular heart beats  · Respiratory  o Denies  o : shortness of breath, productive cough  · Gastrointestinal  o Admits  o : constipation  o Denies  o : nausea, vomiting  · Genitourinary  o Denies  o : dysuria, urinary retention  · Integument  o Denies  o : hair growth change, new skin lesions  · Neurologic  o Admits  o : memory difficulties  o Denies  o : altered mental status, seizures  · Musculoskeletal  o Admits  o : muscle pain, neck pain  o Denies  o : joint swelling, limitation of motion  · Endocrine  o Admits  o : decreased libido, weight gain  o Denies  o : cold intolerance, heat intolerance, weight loss  · Psychiatric  o Admits  o : difficulty sleeping  o Denies  o : suicidal ideation,  "homicidal ideation  · Heme-Lymph  o Denies  o : petechiae, lymph node enlargement or tenderness  · Allergic-Immunologic  o Denies  o : frequent illnesses      Vitals  Date Time BP Position Site L\R Cuff Size HR RR TEMP(F) WT  HT  BMI kg/m2 BSA m2 O2 Sat HC       08/30/2018 01:33 /78 Sitting    101 - R  97.6 214lbs 8oz 5'  5\" 35.69 2.11 97 %           Physical Examination  · Constitutional  o Appearance  o : well developed, well-nourished, in no acute distress  · Head and Face  o HEENT  o : Unremarkable  · Eyes  o Conjunctivae  o : conjunctivae normal  · Neck  o Inspection/Palpation  o : supple  o Thyroid  o : no thyromegaly  · Respiratory  o Respiratory Effort  o : breathing unlabored  o Auscultation of Lungs  o : clear to ascultation  · Cardiovascular  o Heart  o :   § Auscultation of Heart  § : regular rate and rhythm  o Peripheral Vascular System  o :   § Extremities  § : no edema  · Gastrointestinal  o Abdominal Examination  o :   § Abdomen  § : soft  · Lymphatic  o Neck  o : no lymphadenopathy present  · Musculoskeletal  o General  o :   § General Musculoskeletal  § : No joint swelling or deformity., Muscle tone, strength, and development grossly normal.  · Skin and Subcutaneous Tissue  o General Inspection  o : skin turgor normal, texture normal  · Neurologic  o Gait and Station  o :   § Gait Screening  § : normal gait  · Psychiatric  o Mood and Affect  o : mood normal, affect appropriate          Assessment  · Asthma     493.90/J45.909  · Fatigue     780.79/R53.83  · GERD (gastroesophageal reflux disease)     530.81/K21.9  · Weight gain     783.1/R63.5  · Moodiness     V40.2/F48.9  · Insomnia     780.52/G47.00  · Irritable     799.22/R45.4  · Libido, decreased     799.81/R68.82  · S/P hysterectomy     V88.01/Z90.710  · Bone pain     733.90/M89.8X9  · Forgetfulness     780.99/R68.89  · Depression     311/F32.9  · RLS (restless legs syndrome)     333.94/G25.81      Plan  · Orders  o CBC with Auto Diff [facility] " (91284) - 780.79/R53.83 - 08/30/2018  o CMP [facility] (55908) - 780.79/R53.83 - 08/30/2018  o Thyroid Profile (THYII) - 780.79/R53.83 - 08/30/2018  o B12 Folate levels (B12FO) - 780.79/R53.83 - 08/30/2018  o Vitamin D (25-Hydroxy) Level (19801) - 780.79/R53.83, 733.90/M89.8X9 - 08/30/2018  o ACO-14: Influenza immunization administered or previously received () - - 08/30/2018  o ACO-16: BMI above normal range and follow-up plan is documented () - - 08/30/2018  o ACO-18: Depression screening not completed due to current diagnosis of depression or bipolar disorder () - - 08/30/2018  o ACO-20: Screening Mammography documented and reviewed (3014F) - - 08/30/2018  o ACO-39: Current medications updated and reviewed () - - 08/30/2018  o FSH (follicle stimulating hormone) (50917) - 783.1/R63.5, V40.2/F48.9, 799.81/R68.82, V88.01/Z90.710 - 08/30/2018  o LH (luteinizing hormone) (44796) - 783.1/R63.5, V40.2/F48.9, 799.81/R68.82, V88.01/Z90.710 - 08/30/2018  · Medications  o Requip 0.5 mg oral tablet   SIG: take 1 tablet (0.5 mg) by oral route 1-3 hours before bedtime for 30 days   DISP: (30) tablets with 5 refills  Prescribed on 08/30/2018     o Wellbutrin  mg oral tablet extended release 24 hr   SIG: take 1 tablet (150 mg) by oral route once daily for 30 days   DISP: (30) tablets with 1 refills  Prescribed on 08/30/2018     o omeprazole 40 mg oral capsule,delayed release(DR/EC)   SIG: take 1 capsule (40 mg) by oral route once daily before a meal for 30 days   DISP: (30) capsules with 5 refills  Adjusted on 08/30/2018     o Macrobid 100 mg oral capsule   SIG: take 1 capsule (100 mg) by oral route every 12 hours with food for 5 days   DISP: (10) capsules with 0 refills  Discontinued on 08/30/2018     o Pyridium 200 mg oral tablet   SIG: take 1 tablet (200 mg) by oral route 3 times per day after meals for 2 days as needed   DISP: (6) tablets with 0 refills  Discontinued on 08/30/2018      · Instructions  o Maintain a healthy weight. Avoid tight fitting clothes. Avoid fried, fatty foods, tomato sauce, chocolate, mint, garlic, onion, alcohol. caffeine. Eat smaller meals, dont lie down after a meal, dont smoke. Elevate the head of your bed 6-9 inches.  o Take all medications as prescribed/directed.  o Patient was educated/instructed on their diagnosis, treatment and medications prior to discharge from the clinic today.  o Patient counseled to reduce calorie intake.  o Patient was instructed to exercise regularly.  o Patient instructed to seek medical attention urgently for new or worsening symptoms.  o Call the office with any concerns or questions.  o Chronic conditions reviewed and taken into consideration for today's treatment plan.  · Disposition  o Call or Return if symptoms worsen or persist.  o Return Visit Request in/on 1 month +/- 2 days (1646).            Electronically Signed by: ROMEO Jones -Author on August 30, 2018 02:11:59 PM

## 2021-05-09 VITALS
OXYGEN SATURATION: 97 % | WEIGHT: 245.5 LBS | SYSTOLIC BLOOD PRESSURE: 126 MMHG | TEMPERATURE: 97.8 F | HEART RATE: 119 BPM | DIASTOLIC BLOOD PRESSURE: 84 MMHG

## 2021-05-09 VITALS
BODY MASS INDEX: 35.74 KG/M2 | OXYGEN SATURATION: 97 % | TEMPERATURE: 97.6 F | DIASTOLIC BLOOD PRESSURE: 78 MMHG | SYSTOLIC BLOOD PRESSURE: 122 MMHG | HEART RATE: 101 BPM | HEIGHT: 65 IN | WEIGHT: 214.5 LBS

## 2021-05-09 VITALS
HEART RATE: 103 BPM | DIASTOLIC BLOOD PRESSURE: 80 MMHG | SYSTOLIC BLOOD PRESSURE: 120 MMHG | OXYGEN SATURATION: 98 % | WEIGHT: 212 LBS | TEMPERATURE: 97.3 F

## 2021-05-09 VITALS
HEART RATE: 105 BPM | DIASTOLIC BLOOD PRESSURE: 74 MMHG | HEIGHT: 65 IN | BODY MASS INDEX: 36.38 KG/M2 | SYSTOLIC BLOOD PRESSURE: 130 MMHG | WEIGHT: 218.37 LBS | OXYGEN SATURATION: 97 % | TEMPERATURE: 98.2 F

## 2021-05-09 VITALS
HEART RATE: 110 BPM | OXYGEN SATURATION: 97 % | TEMPERATURE: 98.2 F | DIASTOLIC BLOOD PRESSURE: 74 MMHG | SYSTOLIC BLOOD PRESSURE: 130 MMHG | SYSTOLIC BLOOD PRESSURE: 124 MMHG | DIASTOLIC BLOOD PRESSURE: 80 MMHG | WEIGHT: 207.5 LBS | OXYGEN SATURATION: 97 % | HEART RATE: 130 BPM | WEIGHT: 210 LBS

## 2021-05-09 VITALS
HEART RATE: 107 BPM | TEMPERATURE: 97.8 F | DIASTOLIC BLOOD PRESSURE: 84 MMHG | OXYGEN SATURATION: 98 % | SYSTOLIC BLOOD PRESSURE: 126 MMHG | WEIGHT: 214.06 LBS

## 2021-05-09 VITALS
DIASTOLIC BLOOD PRESSURE: 70 MMHG | BODY MASS INDEX: 40.71 KG/M2 | HEIGHT: 65 IN | OXYGEN SATURATION: 98 % | WEIGHT: 244.31 LBS | HEART RATE: 105 BPM | SYSTOLIC BLOOD PRESSURE: 124 MMHG | TEMPERATURE: 98 F

## 2021-05-09 VITALS
OXYGEN SATURATION: 97 % | WEIGHT: 223 LBS | TEMPERATURE: 98.2 F | DIASTOLIC BLOOD PRESSURE: 70 MMHG | HEART RATE: 115 BPM | SYSTOLIC BLOOD PRESSURE: 110 MMHG

## 2021-05-09 VITALS
SYSTOLIC BLOOD PRESSURE: 136 MMHG | WEIGHT: 241.12 LBS | TEMPERATURE: 100.5 F | HEART RATE: 140 BPM | OXYGEN SATURATION: 97 % | HEIGHT: 65 IN | BODY MASS INDEX: 40.17 KG/M2 | DIASTOLIC BLOOD PRESSURE: 86 MMHG

## 2021-05-09 VITALS
HEART RATE: 113 BPM | DIASTOLIC BLOOD PRESSURE: 80 MMHG | WEIGHT: 237 LBS | SYSTOLIC BLOOD PRESSURE: 124 MMHG | OXYGEN SATURATION: 96 % | TEMPERATURE: 97.8 F

## 2021-05-09 VITALS — BODY MASS INDEX: 41.65 KG/M2 | HEIGHT: 65 IN | WEIGHT: 250 LBS

## 2021-05-09 VITALS
TEMPERATURE: 98 F | HEART RATE: 116 BPM | WEIGHT: 212.56 LBS | SYSTOLIC BLOOD PRESSURE: 120 MMHG | DIASTOLIC BLOOD PRESSURE: 80 MMHG | OXYGEN SATURATION: 98 %

## 2021-05-09 VITALS
SYSTOLIC BLOOD PRESSURE: 118 MMHG | WEIGHT: 210.25 LBS | TEMPERATURE: 97.6 F | OXYGEN SATURATION: 98 % | HEART RATE: 110 BPM | HEIGHT: 65 IN | DIASTOLIC BLOOD PRESSURE: 80 MMHG | BODY MASS INDEX: 35.03 KG/M2

## 2021-05-09 VITALS
TEMPERATURE: 100 F | OXYGEN SATURATION: 97 % | DIASTOLIC BLOOD PRESSURE: 74 MMHG | HEIGHT: 65 IN | WEIGHT: 218.5 LBS | BODY MASS INDEX: 36.4 KG/M2 | HEART RATE: 122 BPM | SYSTOLIC BLOOD PRESSURE: 122 MMHG

## 2021-05-09 VITALS
DIASTOLIC BLOOD PRESSURE: 68 MMHG | TEMPERATURE: 98.4 F | OXYGEN SATURATION: 97 % | HEIGHT: 65 IN | BODY MASS INDEX: 37.03 KG/M2 | WEIGHT: 222.25 LBS | HEART RATE: 94 BPM | SYSTOLIC BLOOD PRESSURE: 110 MMHG

## 2021-05-09 VITALS
TEMPERATURE: 97.2 F | OXYGEN SATURATION: 97 % | SYSTOLIC BLOOD PRESSURE: 130 MMHG | DIASTOLIC BLOOD PRESSURE: 82 MMHG | HEART RATE: 113 BPM | WEIGHT: 221.5 LBS

## 2021-05-16 VITALS — RESPIRATION RATE: 16 BRPM | HEIGHT: 65 IN | BODY MASS INDEX: 31.16 KG/M2 | WEIGHT: 187 LBS

## 2021-05-16 VITALS — WEIGHT: 187 LBS | BODY MASS INDEX: 31.16 KG/M2 | RESPIRATION RATE: 16 BRPM | HEIGHT: 65 IN

## 2021-07-02 ENCOUNTER — OFFICE VISIT (OUTPATIENT)
Dept: FAMILY MEDICINE CLINIC | Facility: CLINIC | Age: 46
End: 2021-07-02

## 2021-07-02 VITALS
TEMPERATURE: 97.8 F | HEART RATE: 112 BPM | WEIGHT: 242 LBS | DIASTOLIC BLOOD PRESSURE: 80 MMHG | BODY MASS INDEX: 41.32 KG/M2 | OXYGEN SATURATION: 97 % | HEIGHT: 64 IN | SYSTOLIC BLOOD PRESSURE: 136 MMHG

## 2021-07-02 DIAGNOSIS — N39.0 ACUTE UTI (URINARY TRACT INFECTION): ICD-10-CM

## 2021-07-02 DIAGNOSIS — R30.0 BURNING WITH URINATION: Primary | ICD-10-CM

## 2021-07-02 LAB
BILIRUB BLD-MCNC: ABNORMAL MG/DL
CLARITY, POC: ABNORMAL
COLOR UR: ABNORMAL
GLUCOSE UR STRIP-MCNC: NEGATIVE MG/DL
KETONES UR QL: NEGATIVE
LEUKOCYTE EST, POC: NEGATIVE
NITRITE UR-MCNC: POSITIVE MG/ML
PH UR: 5 [PH] (ref 5–8)
PROT UR STRIP-MCNC: ABNORMAL MG/DL
RBC # UR STRIP: ABNORMAL /UL
SP GR UR: 1.03 (ref 1–1.03)
UROBILINOGEN UR QL: NORMAL

## 2021-07-02 PROCEDURE — 81003 URINALYSIS AUTO W/O SCOPE: CPT | Performed by: FAMILY MEDICINE

## 2021-07-02 PROCEDURE — 99213 OFFICE O/P EST LOW 20 MIN: CPT | Performed by: FAMILY MEDICINE

## 2021-07-02 RX ORDER — PHENAZOPYRIDINE HYDROCHLORIDE 200 MG/1
200 TABLET, FILM COATED ORAL 3 TIMES DAILY PRN
Qty: 6 TABLET | Refills: 0 | Status: SHIPPED | OUTPATIENT
Start: 2021-07-02 | End: 2021-07-04

## 2021-07-02 RX ORDER — CIPROFLOXACIN 250 MG/1
250 TABLET, FILM COATED ORAL 2 TIMES DAILY
Qty: 6 TABLET | Refills: 0 | Status: SHIPPED | OUTPATIENT
Start: 2021-07-02 | End: 2021-07-05

## 2021-08-23 PROCEDURE — U0003 INFECTIOUS AGENT DETECTION BY NUCLEIC ACID (DNA OR RNA); SEVERE ACUTE RESPIRATORY SYNDROME CORONAVIRUS 2 (SARS-COV-2) (CORONAVIRUS DISEASE [COVID-19]), AMPLIFIED PROBE TECHNIQUE, MAKING USE OF HIGH THROUGHPUT TECHNOLOGIES AS DESCRIBED BY CMS-2020-01-R: HCPCS | Performed by: FAMILY MEDICINE

## 2021-11-09 ENCOUNTER — OFFICE VISIT (OUTPATIENT)
Dept: FAMILY MEDICINE CLINIC | Facility: CLINIC | Age: 46
End: 2021-11-09

## 2021-11-09 ENCOUNTER — TELEPHONE (OUTPATIENT)
Dept: FAMILY MEDICINE CLINIC | Facility: CLINIC | Age: 46
End: 2021-11-09

## 2021-11-09 VITALS
RESPIRATION RATE: 20 BRPM | TEMPERATURE: 97.7 F | HEART RATE: 75 BPM | DIASTOLIC BLOOD PRESSURE: 72 MMHG | WEIGHT: 252.4 LBS | BODY MASS INDEX: 43.09 KG/M2 | OXYGEN SATURATION: 98 % | SYSTOLIC BLOOD PRESSURE: 108 MMHG | HEIGHT: 64 IN

## 2021-11-09 DIAGNOSIS — Z00.00 WELLNESS EXAMINATION: Primary | ICD-10-CM

## 2021-11-09 DIAGNOSIS — R53.83 FATIGUE, UNSPECIFIED TYPE: ICD-10-CM

## 2021-11-09 DIAGNOSIS — J45.30 MILD PERSISTENT ASTHMA WITHOUT COMPLICATION: ICD-10-CM

## 2021-11-09 DIAGNOSIS — J44.9 CHRONIC OBSTRUCTIVE PULMONARY DISEASE, UNSPECIFIED COPD TYPE (HCC): ICD-10-CM

## 2021-11-09 DIAGNOSIS — M19.90 ARTHRITIS: ICD-10-CM

## 2021-11-09 DIAGNOSIS — E66.01 MORBID OBESITY (HCC): ICD-10-CM

## 2021-11-09 DIAGNOSIS — G25.81 RESTLESS LEGS SYNDROME: ICD-10-CM

## 2021-11-09 DIAGNOSIS — K21.9 CHRONIC GERD: ICD-10-CM

## 2021-11-09 DIAGNOSIS — R73.03 PREDIABETES: ICD-10-CM

## 2021-11-09 DIAGNOSIS — Z12.31 ENCOUNTER FOR SCREENING MAMMOGRAM FOR MALIGNANT NEOPLASM OF BREAST: ICD-10-CM

## 2021-11-09 PROBLEM — D64.9 ANEMIA: Status: ACTIVE | Noted: 2021-11-09

## 2021-11-09 PROBLEM — K44.9 HIATAL HERNIA: Status: ACTIVE | Noted: 2021-11-09

## 2021-11-09 LAB
DEPRECATED RDW RBC AUTO: 42.8 FL (ref 37–54)
ERYTHROCYTE [DISTWIDTH] IN BLOOD BY AUTOMATED COUNT: 13.3 % (ref 12.3–15.4)
FOLATE SERPL-MCNC: 4.15 NG/ML (ref 4.78–24.2)
HBA1C MFR BLD: 6.01 % (ref 4.8–5.6)
HCT VFR BLD AUTO: 41 % (ref 34–46.6)
HGB BLD-MCNC: 13.8 G/DL (ref 12–15.9)
MCH RBC QN AUTO: 29.6 PG (ref 26.6–33)
MCHC RBC AUTO-ENTMCNC: 33.7 G/DL (ref 31.5–35.7)
MCV RBC AUTO: 87.8 FL (ref 79–97)
PLATELET # BLD AUTO: 331 10*3/MM3 (ref 140–450)
PMV BLD AUTO: 8.3 FL (ref 6–12)
RBC # BLD AUTO: 4.67 10*6/MM3 (ref 3.77–5.28)
VIT B12 BLD-MCNC: 470 PG/ML (ref 211–946)
WBC # BLD AUTO: 12.34 10*3/MM3 (ref 3.4–10.8)

## 2021-11-09 PROCEDURE — 84443 ASSAY THYROID STIM HORMONE: CPT | Performed by: NURSE PRACTITIONER

## 2021-11-09 PROCEDURE — 80061 LIPID PANEL: CPT | Performed by: NURSE PRACTITIONER

## 2021-11-09 PROCEDURE — 83036 HEMOGLOBIN GLYCOSYLATED A1C: CPT | Performed by: NURSE PRACTITIONER

## 2021-11-09 PROCEDURE — 99396 PREV VISIT EST AGE 40-64: CPT | Performed by: NURSE PRACTITIONER

## 2021-11-09 PROCEDURE — 82607 VITAMIN B-12: CPT | Performed by: NURSE PRACTITIONER

## 2021-11-09 PROCEDURE — 85027 COMPLETE CBC AUTOMATED: CPT | Performed by: NURSE PRACTITIONER

## 2021-11-09 PROCEDURE — 36415 COLL VENOUS BLD VENIPUNCTURE: CPT | Performed by: NURSE PRACTITIONER

## 2021-11-09 PROCEDURE — 80053 COMPREHEN METABOLIC PANEL: CPT | Performed by: NURSE PRACTITIONER

## 2021-11-09 PROCEDURE — 82746 ASSAY OF FOLIC ACID SERUM: CPT | Performed by: NURSE PRACTITIONER

## 2021-11-09 RX ORDER — ROPINIROLE 0.5 MG/1
0.5 TABLET, FILM COATED ORAL NIGHTLY
Qty: 30 TABLET | Refills: 5 | Status: SHIPPED | OUTPATIENT
Start: 2021-11-09 | End: 2022-09-20 | Stop reason: SDUPTHER

## 2021-11-09 RX ORDER — OMEPRAZOLE 40 MG/1
40 CAPSULE, DELAYED RELEASE ORAL DAILY
Qty: 30 CAPSULE | Refills: 5 | Status: SHIPPED | OUTPATIENT
Start: 2021-11-09 | End: 2022-02-23

## 2021-11-09 RX ORDER — ALBUTEROL SULFATE 90 UG/1
2 AEROSOL, METERED RESPIRATORY (INHALATION) EVERY 6 HOURS PRN
Qty: 18 G | Refills: 2 | Status: SHIPPED | OUTPATIENT
Start: 2021-11-09 | End: 2022-04-29 | Stop reason: SDUPTHER

## 2021-11-09 RX ORDER — DOXYLAMINE SUCCINATE 25 MG/1
25 TABLET ORAL NIGHTLY PRN
COMMUNITY
End: 2022-07-20

## 2021-11-09 NOTE — TELEPHONE ENCOUNTER
Caller: Hattie Pringle    Relationship to patient: Self    Best call back number: [phone]    Patient is needing: PATIENT CALLED IN AND WANTED TO KNOW IF SHE NEEDS TO BE FASTING TODAY AHEAD OF HER APPOINTMENT AT 1 P.M. PLEASE CALL PATIENT AND ADVISE.

## 2021-11-09 NOTE — PROGRESS NOTES
Venipuncture Blood Specimen Collection  Venipuncture performed in left arm by So Vega with good hemostasis. Patient tolerated the procedure well without complications.   11/09/21   So Vega
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
## 2021-11-10 LAB
ALBUMIN SERPL-MCNC: 4.4 G/DL (ref 3.5–5.2)
ALBUMIN/GLOB SERPL: 1.6 G/DL
ALP SERPL-CCNC: 67 U/L (ref 39–117)
ALT SERPL W P-5'-P-CCNC: 22 U/L (ref 1–33)
ANION GAP SERPL CALCULATED.3IONS-SCNC: 11.7 MMOL/L (ref 5–15)
AST SERPL-CCNC: 17 U/L (ref 1–32)
BILIRUB SERPL-MCNC: 0.2 MG/DL (ref 0–1.2)
BUN SERPL-MCNC: 11 MG/DL (ref 6–20)
BUN/CREAT SERPL: 15.5 (ref 7–25)
CALCIUM SPEC-SCNC: 9.3 MG/DL (ref 8.6–10.5)
CHLORIDE SERPL-SCNC: 101 MMOL/L (ref 98–107)
CHOLEST SERPL-MCNC: 174 MG/DL (ref 0–200)
CO2 SERPL-SCNC: 25.3 MMOL/L (ref 22–29)
CREAT SERPL-MCNC: 0.71 MG/DL (ref 0.57–1)
GFR SERPL CREATININE-BSD FRML MDRD: 89 ML/MIN/1.73
GLOBULIN UR ELPH-MCNC: 2.8 GM/DL
GLUCOSE SERPL-MCNC: 90 MG/DL (ref 65–99)
HDLC SERPL-MCNC: 45 MG/DL (ref 40–60)
LDLC SERPL CALC-MCNC: 100 MG/DL (ref 0–100)
LDLC/HDLC SERPL: 2.12 {RATIO}
POTASSIUM SERPL-SCNC: 4.3 MMOL/L (ref 3.5–5.2)
PROT SERPL-MCNC: 7.2 G/DL (ref 6–8.5)
SODIUM SERPL-SCNC: 138 MMOL/L (ref 136–145)
TRIGL SERPL-MCNC: 169 MG/DL (ref 0–150)
TSH SERPL DL<=0.05 MIU/L-ACNC: 1.36 UIU/ML (ref 0.27–4.2)
VLDLC SERPL-MCNC: 29 MG/DL (ref 5–40)

## 2021-11-10 NOTE — TELEPHONE ENCOUNTER
Caller: Hattie Pringle    Relationship: Self    Best call back number: [phone]    Caller requesting test results: PATIENT    What test was performed: LABS    When was the test performed: 11/9/21    Where was the test performed: IN OFFICE    Additional notes: PATIENT WOULD LIKE TO KNOW THE RESULTS OF HER LABS. PLEASE CALL TO ADVISE WHEN THEY ARE AVAILABLE.

## 2021-11-11 DIAGNOSIS — D72.829 LEUKOCYTOSIS, UNSPECIFIED TYPE: ICD-10-CM

## 2021-11-11 DIAGNOSIS — E53.8 FOLIC ACID DEFICIENCY: Primary | ICD-10-CM

## 2021-11-11 RX ORDER — FOLIC ACID 1 MG/1
1 TABLET ORAL DAILY
Qty: 30 TABLET | Refills: 11 | Status: SHIPPED | OUTPATIENT
Start: 2021-11-11 | End: 2023-03-20

## 2021-11-11 NOTE — PROGRESS NOTES
Please mail letter to patient stating    Hattie, your thyroid levels were normal range; your comprehensive panel shows normal fasting glucose normal kidney and liver function test and normal electrolytes; your cholesterol levels show everything as normal except the triglycerides were slightly elevated at 169 and it should be less than 150 and this is normally related to your diet; your vitamin B12 was normal range and the folic acid was slightly low so I will send in the prescription for the folic acid 1 mg daily and you will to stay on this; the hemoglobin A1c was elevated at 6.01% and normal range is 4.8 to 5.6% so you are in the prediabetic range if it reaches 6.5% or higher than you are a diabetic; and then lastly your blood counts revealed red blood cell and hemoglobin and hematocrit and platelets all in normal range and your white blood cell count is modestly elevated at 12.34 and normal is 10.80 and less and its been elevated before in the past and if you are a smoker it could be related to this--please stay well-hydrated and I will place the order for repeated nonfasting blood count--also I will try to get this letter typed up for you today and that way you can pick it up tomorrow

## 2021-11-16 ENCOUNTER — HOSPITAL ENCOUNTER (OUTPATIENT)
Dept: MAMMOGRAPHY | Facility: HOSPITAL | Age: 46
Discharge: HOME | End: 2021-11-16
Admitting: NURSE PRACTITIONER

## 2021-11-16 DIAGNOSIS — Z12.31 ENCOUNTER FOR SCREENING MAMMOGRAM FOR MALIGNANT NEOPLASM OF BREAST: ICD-10-CM

## 2021-11-16 PROCEDURE — 77067 SCR MAMMO BI INCL CAD: CPT

## 2021-11-16 NOTE — PROGRESS NOTES
Please mail letter to patient stating    Hattie, your mammogram was read as benign/normal and for you to continue doing your yearly routine mammogram screenings and I would like for you to continue doing your self breast exams monthly

## 2021-12-21 ENCOUNTER — OFFICE VISIT (OUTPATIENT)
Dept: FAMILY MEDICINE CLINIC | Facility: CLINIC | Age: 46
End: 2021-12-21

## 2021-12-21 VITALS — TEMPERATURE: 97.6 F | HEART RATE: 77 BPM | OXYGEN SATURATION: 98 %

## 2021-12-21 DIAGNOSIS — R09.89 SINUS SYMPTOM: Primary | ICD-10-CM

## 2021-12-21 DIAGNOSIS — J01.40 ACUTE NON-RECURRENT PANSINUSITIS: ICD-10-CM

## 2021-12-21 LAB
EXPIRATION DATE: NORMAL
INTERNAL CONTROL: NORMAL
Lab: NORMAL
SARS-COV-2 AG UPPER RESP QL IA.RAPID: NOT DETECTED

## 2021-12-21 PROCEDURE — 87426 SARSCOV CORONAVIRUS AG IA: CPT | Performed by: NURSE PRACTITIONER

## 2021-12-21 PROCEDURE — 99213 OFFICE O/P EST LOW 20 MIN: CPT | Performed by: NURSE PRACTITIONER

## 2021-12-21 RX ORDER — AMOXICILLIN 875 MG/1
875 TABLET, COATED ORAL 2 TIMES DAILY
Qty: 20 TABLET | Refills: 0 | Status: SHIPPED | OUTPATIENT
Start: 2021-12-21 | End: 2022-01-31
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
## 2022-01-31 ENCOUNTER — OFFICE VISIT (OUTPATIENT)
Dept: FAMILY MEDICINE CLINIC | Facility: CLINIC | Age: 47
End: 2022-01-31

## 2022-01-31 VITALS — HEART RATE: 100 BPM | TEMPERATURE: 98 F | OXYGEN SATURATION: 99 %

## 2022-01-31 DIAGNOSIS — J45.30 MILD PERSISTENT ASTHMA WITHOUT COMPLICATION: ICD-10-CM

## 2022-01-31 DIAGNOSIS — J06.9 ACUTE URI: Primary | ICD-10-CM

## 2022-01-31 DIAGNOSIS — R05.9 COUGHING: ICD-10-CM

## 2022-01-31 PROCEDURE — 87426 SARSCOV CORONAVIRUS AG IA: CPT | Performed by: NURSE PRACTITIONER

## 2022-01-31 PROCEDURE — 99213 OFFICE O/P EST LOW 20 MIN: CPT | Performed by: NURSE PRACTITIONER

## 2022-01-31 PROCEDURE — U0004 COV-19 TEST NON-CDC HGH THRU: HCPCS | Performed by: NURSE PRACTITIONER

## 2022-01-31 RX ORDER — BENZONATATE 100 MG/1
100 CAPSULE ORAL 3 TIMES DAILY PRN
Qty: 30 CAPSULE | Refills: 0 | Status: SHIPPED | OUTPATIENT
Start: 2022-01-31 | End: 2022-02-23

## 2022-01-31 RX ORDER — DEXAMETHASONE 4 MG/1
4 TABLET ORAL
Qty: 5 TABLET | Refills: 0 | Status: SHIPPED | OUTPATIENT
Start: 2022-01-31 | End: 2022-02-23

## 2022-01-31 NOTE — PROGRESS NOTES
"Chief Complaint  Cough (Cough and congestion x 2 days. ) and URI    Subjective        Past Medical History:   Diagnosis Date   • Acid reflux disease    • Anemia    • Asthma    • Chronic GERD    • Degenerative arthritis of ankle    • Diverticulitis    • Emotional depression    • Hiatal hernia    • Limited joint range of motion    • Menorrhagia    • MRSA (methicillin resistant Staphylococcus aureus)    • Nerve damage    • Pneumonia     15 years ago   • Restless leg syndrome    • UTI (urinary tract infection)      Social History     Tobacco Use   • Smoking status: Former Smoker     Types: Cigarettes   • Smokeless tobacco: Never Used   • Tobacco comment: one pack per week   Vaping Use   • Vaping Use: Never used   Substance Use Topics   • Alcohol use: Never   • Drug use: Never      Current Outpatient Medications on File Prior to Visit   Medication Sig   • albuterol (PROVENTIL) (5 MG/ML) 0.5% nebulizer solution Take 2.5 mg by nebulization Every 6 (Six) Hours As Needed for Wheezing.   • albuterol sulfate  (90 Base) MCG/ACT inhaler Inhale 2 puffs Every 6 (Six) Hours As Needed for Wheezing or Shortness of Air.   • doxylamine (Sleep Aid) 25 MG tablet Take 25 mg by mouth At Night As Needed for Sleep. OTC   • folic acid (FOLVITE) 1 MG tablet Take 1 tablet by mouth Daily.   • omeprazole (priLOSEC) 40 MG capsule Take 1 capsule by mouth Daily.   • rOPINIRole (REQUIP) 0.5 MG tablet Take 1 tablet by mouth Every Night. Take 1 hour before bedtime.   • [DISCONTINUED] amoxicillin (AMOXIL) 875 MG tablet Take 1 tablet by mouth 2 (Two) Times a Day.     No current facility-administered medications on file prior to visit.      Allergies   Allergen Reactions   • Gabapentin Shortness Of Breath     SOA AND \"MAKES ME CRAZY\"   Other reaction(s): \"makes me crazy\"   • Tussionex Pennkinetic Er [Hydrocod Polst-Cpm Polst Er] Shortness Of Breath   • Guaifenesin Rash      Health Maintenance Due   Topic Date Due   • Pneumococcal Vaccine 0-64 (1 of " 2 - PPSV23) Never done   • TDAP/TD VACCINES (1 - Tdap) Never done   • HEPATITIS C SCREENING  Never done   • ANNUAL WELLNESS VISIT  Never done   • INFLUENZA VACCINE  08/01/2021   • COVID-19 Vaccine (3 - Booster for Moderna series) 11/01/2021      Hattie Pringle presents to [facility]  Patient presents to the office today and being seen outside for cough and congestion x2 days.  Patient notes that her back is sore from coughing.  Patient states that her mucus is clear today but yesterday she was coughing up green mucus and blowing out.  Patient has noted diarrhea today.  Patient denies fever, chills, body aches, nausea, vomiting, shortness of breath, wheezing patient has had her Covid vaccine and booster.  No known close exposure to COVID-19.  Patient states she has been using a prescription Robitussin but it seems to make her asthma symptoms worse.  States that she is done better with Tessalon Perles in the past.      Objective   Vital Signs:   Pulse 100   Temp 98 °F (36.7 °C)   SpO2 99%     Review of Systems   Physical Exam  Vitals reviewed.   Constitutional:       General: She is not in acute distress.     Appearance: Normal appearance. She is well-developed.   HENT:      Head: Normocephalic and atraumatic.      Right Ear: External ear normal.      Left Ear: External ear normal.   Eyes:      Conjunctiva/sclera: Conjunctivae normal.      Pupils: Pupils are equal, round, and reactive to light.   Cardiovascular:      Rate and Rhythm: Normal rate and regular rhythm.      Heart sounds: Normal heart sounds.   Pulmonary:      Effort: Pulmonary effort is normal.      Breath sounds: Normal breath sounds.      Comments: Bronchitic cough noted  Musculoskeletal:      Cervical back: Neck supple.   Skin:     General: Skin is warm and dry.   Neurological:      Mental Status: She is alert and oriented to person, place, and time.   Psychiatric:         Mood and Affect: Mood and affect normal.          Behavior: Behavior normal.         Thought Content: Thought content normal.         Judgment: Judgment normal.        Result Review :   The following data was reviewed by: PHILLIP Jenkins on 01/31/2022:  Poct covid: negative            Assessment and Plan    Diagnoses and all orders for this visit:    1. Acute URI (Primary)    2. Coughing  -     POCT SARS-CoV-2 Antigen GURU  -     benzonatate (Tessalon Perles) 100 MG capsule; Take 1 capsule by mouth 3 (Three) Times a Day As Needed for Cough.  Dispense: 30 capsule; Refill: 0  -     COVID-19,APTIMA PANTHER(HERNANDEZ),[facility], NP/OP SWAB IN UTM/VTM/[facility] TRANSPORT MEDIA,24 HR TAT - Swab, Nasopharynx    3. Mild persistent asthma without complication  -     dexamethasone (DECADRON) 4 MG tablet; Take 1 tablet by mouth Daily With Breakfast.  Dispense: 5 tablet; Refill: 0        Follow Up   Return if symptoms worsen or fail to improve.  Patient was given instructions and counseling regarding her condition or for health maintenance advice. Please see specific information pulled into the AVS if appropriate.

## 2022-02-01 ENCOUNTER — TELEPHONE (OUTPATIENT)
Dept: FAMILY MEDICINE CLINIC | Facility: CLINIC | Age: 47
End: 2022-02-01

## 2022-02-01 NOTE — TELEPHONE ENCOUNTER
Caller: Hattie Pringle    Relationship: Self    Best call back number: [phone]    Caller requesting test results: YES    What test was performed: COVID     When was the test performed:1/31/22    Where was the test performed: OFFICE    Additional notes:

## 2022-02-02 LAB — SARS-COV-2 RNA PNL SPEC NAA+PROBE: NOT DETECTED

## 2022-02-10 ENCOUNTER — TELEPHONE (OUTPATIENT)
Dept: FAMILY MEDICINE CLINIC | Facility: CLINIC | Age: 47
End: 2022-02-10

## 2022-02-23 ENCOUNTER — OFFICE VISIT (OUTPATIENT)
Dept: FAMILY MEDICINE CLINIC | Facility: CLINIC | Age: 47
End: 2022-02-23

## 2022-02-23 VITALS
HEIGHT: 65 IN | OXYGEN SATURATION: 97 % | HEART RATE: 80 BPM | SYSTOLIC BLOOD PRESSURE: 132 MMHG | WEIGHT: 263 LBS | DIASTOLIC BLOOD PRESSURE: 78 MMHG | TEMPERATURE: 96.7 F | BODY MASS INDEX: 43.82 KG/M2

## 2022-02-23 DIAGNOSIS — E66.01 MORBID OBESITY: ICD-10-CM

## 2022-02-23 DIAGNOSIS — Z01.818 PRE-OP EXAMINATION: Primary | ICD-10-CM

## 2022-02-23 DIAGNOSIS — Z71.85 VACCINE COUNSELING: ICD-10-CM

## 2022-02-23 DIAGNOSIS — R73.03 PREDIABETES: ICD-10-CM

## 2022-02-23 DIAGNOSIS — Z23 NEED FOR PNEUMOCOCCAL VACCINE: ICD-10-CM

## 2022-02-23 DIAGNOSIS — Z11.59 ENCOUNTER FOR HEPATITIS C SCREENING TEST FOR LOW RISK PATIENT: ICD-10-CM

## 2022-02-23 LAB
ALBUMIN SERPL-MCNC: 4.3 G/DL (ref 3.5–5.2)
ALBUMIN/GLOB SERPL: 2 G/DL
ALP SERPL-CCNC: 58 U/L (ref 39–117)
ALT SERPL W P-5'-P-CCNC: 18 U/L (ref 1–33)
ANION GAP SERPL CALCULATED.3IONS-SCNC: 12.6 MMOL/L (ref 5–15)
AST SERPL-CCNC: 12 U/L (ref 1–32)
BILIRUB SERPL-MCNC: 0.2 MG/DL (ref 0–1.2)
BUN SERPL-MCNC: 16 MG/DL (ref 6–20)
BUN/CREAT SERPL: 21.9 (ref 7–25)
CALCIUM SPEC-SCNC: 9.2 MG/DL (ref 8.6–10.5)
CHLORIDE SERPL-SCNC: 102 MMOL/L (ref 98–107)
CO2 SERPL-SCNC: 24.4 MMOL/L (ref 22–29)
CREAT SERPL-MCNC: 0.73 MG/DL (ref 0.57–1)
DEPRECATED RDW RBC AUTO: 43.5 FL (ref 37–54)
ERYTHROCYTE [DISTWIDTH] IN BLOOD BY AUTOMATED COUNT: 13.3 % (ref 12.3–15.4)
GFR SERPL CREATININE-BSD FRML MDRD: 87 ML/MIN/1.73
GLOBULIN UR ELPH-MCNC: 2.2 GM/DL
GLUCOSE SERPL-MCNC: 115 MG/DL (ref 65–99)
HBA1C MFR BLD: 6.5 % (ref 4.8–5.6)
HCT VFR BLD AUTO: 40.8 % (ref 34–46.6)
HCV AB SER DONR QL: NORMAL
HGB BLD-MCNC: 13.1 G/DL (ref 12–15.9)
MCH RBC QN AUTO: 28.8 PG (ref 26.6–33)
MCHC RBC AUTO-ENTMCNC: 32.1 G/DL (ref 31.5–35.7)
MCV RBC AUTO: 89.7 FL (ref 79–97)
PLATELET # BLD AUTO: 336 10*3/MM3 (ref 140–450)
PMV BLD AUTO: 8.2 FL (ref 6–12)
POTASSIUM SERPL-SCNC: 4.3 MMOL/L (ref 3.5–5.2)
PROT SERPL-MCNC: 6.5 G/DL (ref 6–8.5)
RBC # BLD AUTO: 4.55 10*6/MM3 (ref 3.77–5.28)
SODIUM SERPL-SCNC: 139 MMOL/L (ref 136–145)
T4 FREE SERPL-MCNC: 0.99 NG/DL (ref 0.93–1.7)
TSH SERPL DL<=0.05 MIU/L-ACNC: 1.82 UIU/ML (ref 0.27–4.2)
UREA BREATH TEST QL: NEGATIVE
WBC NRBC COR # BLD: 12.95 10*3/MM3 (ref 3.4–10.8)

## 2022-02-23 PROCEDURE — G0009 ADMIN PNEUMOCOCCAL VACCINE: HCPCS | Performed by: NURSE PRACTITIONER

## 2022-02-23 PROCEDURE — 84439 ASSAY OF FREE THYROXINE: CPT | Performed by: NURSE PRACTITIONER

## 2022-02-23 PROCEDURE — 90715 TDAP VACCINE 7 YRS/> IM: CPT | Performed by: NURSE PRACTITIONER

## 2022-02-23 PROCEDURE — 84443 ASSAY THYROID STIM HORMONE: CPT | Performed by: NURSE PRACTITIONER

## 2022-02-23 PROCEDURE — 93000 ELECTROCARDIOGRAM COMPLETE: CPT | Performed by: NURSE PRACTITIONER

## 2022-02-23 PROCEDURE — 83036 HEMOGLOBIN GLYCOSYLATED A1C: CPT | Performed by: NURSE PRACTITIONER

## 2022-02-23 PROCEDURE — 83013 H PYLORI (C-13) BREATH: CPT | Performed by: NURSE PRACTITIONER

## 2022-02-23 PROCEDURE — 80053 COMPREHEN METABOLIC PANEL: CPT | Performed by: NURSE PRACTITIONER

## 2022-02-23 PROCEDURE — 85027 COMPLETE CBC AUTOMATED: CPT | Performed by: NURSE PRACTITIONER

## 2022-02-23 PROCEDURE — 90732 PPSV23 VACC 2 YRS+ SUBQ/IM: CPT | Performed by: NURSE PRACTITIONER

## 2022-02-23 PROCEDURE — 86803 HEPATITIS C AB TEST: CPT | Performed by: NURSE PRACTITIONER

## 2022-02-23 PROCEDURE — 99214 OFFICE O/P EST MOD 30 MIN: CPT | Performed by: NURSE PRACTITIONER

## 2022-02-23 PROCEDURE — 90471 IMMUNIZATION ADMIN: CPT | Performed by: NURSE PRACTITIONER

## 2022-02-23 RX ORDER — SULFAMETHOXAZOLE AND TRIMETHOPRIM 800; 160 MG/1; MG/1
TABLET ORAL
COMMUNITY
Start: 2022-02-06 | End: 2022-07-20
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
## 2022-02-24 NOTE — PROGRESS NOTES
Please mail letter to patient but also print out these labs put them with the [facility] letter for the bariatric surgery clearance that is in my inbox and then please give everything to Mario make sure that all of the labs that I drew yesterday are included thank you    Hattie the comprehensive panel shows a random glucose of 115 your kidney and liver function tests are normal and your electrolytes are normal; thyroid levels are normal range; hep C screen negative; blood counts are all normal except the white blood cell count is still modestly elevated at 12.95 and has been that way over the last year have you seen hematology yet with regards to this and are you still smoking I thought you told me you quit smoking last year?-Because you would need further evaluation since this persisted; the H. pylori breath test completely negative; and then the hemoglobin A1c for your prediabetes has now bumped to full-blown diabetes at 6.5% and although it does not warrant actual medication yet since you are having a bariatric procedure upcoming you do need to watch her diet because if he had 7% or higher we will have to start medications

## 2022-03-04 ENCOUNTER — TELEPHONE (OUTPATIENT)
Dept: FAMILY MEDICINE CLINIC | Facility: CLINIC | Age: 47
End: 2022-03-04

## 2022-03-04 NOTE — TELEPHONE ENCOUNTER
Caller: Hattie Pringle    Relationship to patient: Self    Best call back number: [phone]     Patient is needing: THE PATIENT HAS CALLED STATING [facility] WAS MISSING LAB.     PATIENT STATED THEY RECEIVED ALL OF THE RESULTS BESIDES H. PYLORI

## 2022-03-10 ENCOUNTER — TELEPHONE (OUTPATIENT)
Dept: FAMILY MEDICINE CLINIC | Facility: CLINIC | Age: 47
End: 2022-03-10

## 2022-03-10 NOTE — TELEPHONE ENCOUNTER
Caller: Hattie Pringle    Relationship: Self    Best call back number: [phone]    What form or medical record are you requesting:LETTER WITH LETTERHEAD SAYING SHE IS DIABETIC AND WBC COUNT IS ELEVATED.     Who is requesting this form or medical record from you: PATIENT    How would you like to receive the form or medical records (pick-up, mail, fax):   If fax, what is the fax number: N/A  If mail, what is the address: N/A  If pick-up, provide patient with address and location details    Timeframe paperwork needed:  TOMORROW    Additional notes: PATIENT NEEDS A DEEP CLEANING OF HER GUMS AND TEETH AND THIS LETTER IS THE ONLY WAY SHE CAN HAVE THE INSURANCE PAY FOR THIS SERVICE. PLEASE CALL PATIENT WHEN THIS IS READY FOR PICKUP.

## 2022-04-29 DIAGNOSIS — J45.30 MILD PERSISTENT ASTHMA WITHOUT COMPLICATION: ICD-10-CM

## 2022-04-29 DIAGNOSIS — J44.9 CHRONIC OBSTRUCTIVE PULMONARY DISEASE, UNSPECIFIED COPD TYPE: ICD-10-CM

## 2022-04-29 RX ORDER — ALBUTEROL SULFATE 90 UG/1
2 AEROSOL, METERED RESPIRATORY (INHALATION) EVERY 6 HOURS PRN
Qty: 18 G | Refills: 2 | Status: SHIPPED | OUTPATIENT
Start: 2022-04-29 | End: 2022-07-11

## 2022-04-29 NOTE — TELEPHONE ENCOUNTER
Pt needs a refill of her albuterol inh.  She is having surgery on Monday and they told her she needed to bring one with her but she is out.

## 2022-07-10 DIAGNOSIS — J45.30 MILD PERSISTENT ASTHMA WITHOUT COMPLICATION: ICD-10-CM

## 2022-07-10 DIAGNOSIS — J44.9 CHRONIC OBSTRUCTIVE PULMONARY DISEASE, UNSPECIFIED COPD TYPE: ICD-10-CM

## 2022-07-11 RX ORDER — ALBUTEROL SULFATE 90 UG/1
AEROSOL, METERED RESPIRATORY (INHALATION)
Qty: 18 G | Refills: 2 | Status: SHIPPED | OUTPATIENT
Start: 2022-07-11 | End: 2023-02-23 | Stop reason: SDUPTHER

## 2022-07-20 ENCOUNTER — OFFICE VISIT (OUTPATIENT)
Dept: FAMILY MEDICINE CLINIC | Facility: CLINIC | Age: 47
End: 2022-07-20

## 2022-07-20 VITALS
BODY MASS INDEX: 35.65 KG/M2 | DIASTOLIC BLOOD PRESSURE: 72 MMHG | TEMPERATURE: 97.1 F | OXYGEN SATURATION: 96 % | WEIGHT: 214 LBS | HEIGHT: 65 IN | SYSTOLIC BLOOD PRESSURE: 124 MMHG | HEART RATE: 88 BPM

## 2022-07-20 DIAGNOSIS — E53.8 FOLIC ACID DEFICIENCY: ICD-10-CM

## 2022-07-20 DIAGNOSIS — M25.469 SWELLING OF KNEE: ICD-10-CM

## 2022-07-20 DIAGNOSIS — Z98.84 S/P LAPAROSCOPIC SLEEVE GASTRECTOMY: ICD-10-CM

## 2022-07-20 DIAGNOSIS — E55.9 VITAMIN D DEFICIENCY: ICD-10-CM

## 2022-07-20 DIAGNOSIS — E66.01 CLASS 2 SEVERE OBESITY DUE TO EXCESS CALORIES WITH SERIOUS COMORBIDITY AND BODY MASS INDEX (BMI) OF 36.0 TO 36.9 IN ADULT: ICD-10-CM

## 2022-07-20 DIAGNOSIS — R73.03 PREDIABETES: Primary | ICD-10-CM

## 2022-07-20 DIAGNOSIS — G25.81 RLS (RESTLESS LEGS SYNDROME): ICD-10-CM

## 2022-07-20 DIAGNOSIS — E78.2 MIXED HYPERLIPIDEMIA: ICD-10-CM

## 2022-07-20 LAB
25(OH)D3 SERPL-MCNC: 41.8 NG/ML (ref 30–100)
ALBUMIN SERPL-MCNC: 4.2 G/DL (ref 3.5–5.2)
ALBUMIN UR-MCNC: <1.2 MG/DL
ALBUMIN/GLOB SERPL: 1.7 G/DL
ALP SERPL-CCNC: 77 U/L (ref 39–117)
ALT SERPL W P-5'-P-CCNC: 49 U/L (ref 1–33)
ANION GAP SERPL CALCULATED.3IONS-SCNC: 11 MMOL/L (ref 5–15)
AST SERPL-CCNC: 27 U/L (ref 1–32)
BACTERIA UR QL AUTO: ABNORMAL /HPF
BASOPHILS # BLD AUTO: 0.05 10*3/MM3 (ref 0–0.2)
BASOPHILS NFR BLD AUTO: 0.5 % (ref 0–1.5)
BILIRUB SERPL-MCNC: 0.4 MG/DL (ref 0–1.2)
BILIRUB UR QL STRIP: NEGATIVE
BUN SERPL-MCNC: 10 MG/DL (ref 6–20)
BUN/CREAT SERPL: 15.9 (ref 7–25)
CALCIUM SPEC-SCNC: 9.3 MG/DL (ref 8.6–10.5)
CHLORIDE SERPL-SCNC: 104 MMOL/L (ref 98–107)
CHOLEST SERPL-MCNC: 144 MG/DL (ref 0–200)
CLARITY UR: ABNORMAL
CO2 SERPL-SCNC: 24 MMOL/L (ref 22–29)
COLOR UR: YELLOW
CREAT SERPL-MCNC: 0.63 MG/DL (ref 0.57–1)
DEPRECATED RDW RBC AUTO: 43.6 FL (ref 37–54)
EGFRCR SERPLBLD CKD-EPI 2021: 111.6 ML/MIN/1.73
EOSINOPHIL # BLD AUTO: 0.14 10*3/MM3 (ref 0–0.4)
EOSINOPHIL NFR BLD AUTO: 1.5 % (ref 0.3–6.2)
ERYTHROCYTE [DISTWIDTH] IN BLOOD BY AUTOMATED COUNT: 13.8 % (ref 12.3–15.4)
GLOBULIN UR ELPH-MCNC: 2.5 GM/DL
GLUCOSE SERPL-MCNC: 84 MG/DL (ref 65–99)
GLUCOSE UR STRIP-MCNC: NEGATIVE MG/DL
HBA1C MFR BLD: 5.5 % (ref 4.8–5.6)
HCT VFR BLD AUTO: 39.5 % (ref 34–46.6)
HDLC SERPL-MCNC: 43 MG/DL (ref 40–60)
HGB BLD-MCNC: 12.9 G/DL (ref 12–15.9)
HGB UR QL STRIP.AUTO: NEGATIVE
HYALINE CASTS UR QL AUTO: ABNORMAL /LPF
IMM GRANULOCYTES # BLD AUTO: 0.06 10*3/MM3 (ref 0–0.05)
IMM GRANULOCYTES NFR BLD AUTO: 0.6 % (ref 0–0.5)
KETONES UR QL STRIP: NEGATIVE
LDLC SERPL CALC-MCNC: 80 MG/DL (ref 0–100)
LDLC/HDLC SERPL: 1.81 {RATIO}
LEUKOCYTE ESTERASE UR QL STRIP.AUTO: ABNORMAL
LYMPHOCYTES # BLD AUTO: 2.15 10*3/MM3 (ref 0.7–3.1)
LYMPHOCYTES NFR BLD AUTO: 22.4 % (ref 19.6–45.3)
MCH RBC QN AUTO: 28.9 PG (ref 26.6–33)
MCHC RBC AUTO-ENTMCNC: 32.7 G/DL (ref 31.5–35.7)
MCV RBC AUTO: 88.4 FL (ref 79–97)
MONOCYTES # BLD AUTO: 0.58 10*3/MM3 (ref 0.1–0.9)
MONOCYTES NFR BLD AUTO: 6 % (ref 5–12)
NEUTROPHILS NFR BLD AUTO: 6.63 10*3/MM3 (ref 1.7–7)
NEUTROPHILS NFR BLD AUTO: 69 % (ref 42.7–76)
NITRITE UR QL STRIP: NEGATIVE
NRBC BLD AUTO-RTO: 0 /100 WBC (ref 0–0.2)
PH UR STRIP.AUTO: 7 [PH] (ref 5–8)
PLATELET # BLD AUTO: 364 10*3/MM3 (ref 140–450)
PMV BLD AUTO: 8.3 FL (ref 6–12)
POTASSIUM SERPL-SCNC: 4.1 MMOL/L (ref 3.5–5.2)
PROT SERPL-MCNC: 6.7 G/DL (ref 6–8.5)
PROT UR QL STRIP: NEGATIVE
RBC # BLD AUTO: 4.47 10*6/MM3 (ref 3.77–5.28)
RBC # UR STRIP: ABNORMAL /HPF
REF LAB TEST METHOD: ABNORMAL
SODIUM SERPL-SCNC: 139 MMOL/L (ref 136–145)
SP GR UR STRIP: 1.01 (ref 1–1.03)
SQUAMOUS #/AREA URNS HPF: ABNORMAL /HPF
TRIGL SERPL-MCNC: 115 MG/DL (ref 0–150)
TSH SERPL DL<=0.05 MIU/L-ACNC: 0.85 UIU/ML (ref 0.27–4.2)
UROBILINOGEN UR QL STRIP: ABNORMAL
VLDLC SERPL-MCNC: 21 MG/DL (ref 5–40)
WBC # UR STRIP: ABNORMAL /HPF
WBC NRBC COR # BLD: 9.61 10*3/MM3 (ref 3.4–10.8)

## 2022-07-20 PROCEDURE — 82607 VITAMIN B-12: CPT | Performed by: NURSE PRACTITIONER

## 2022-07-20 PROCEDURE — 84443 ASSAY THYROID STIM HORMONE: CPT | Performed by: NURSE PRACTITIONER

## 2022-07-20 PROCEDURE — 82306 VITAMIN D 25 HYDROXY: CPT | Performed by: NURSE PRACTITIONER

## 2022-07-20 PROCEDURE — 85025 COMPLETE CBC W/AUTO DIFF WBC: CPT | Performed by: NURSE PRACTITIONER

## 2022-07-20 PROCEDURE — 36415 COLL VENOUS BLD VENIPUNCTURE: CPT | Performed by: NURSE PRACTITIONER

## 2022-07-20 PROCEDURE — 82746 ASSAY OF FOLIC ACID SERUM: CPT | Performed by: NURSE PRACTITIONER

## 2022-07-20 PROCEDURE — 81001 URINALYSIS AUTO W/SCOPE: CPT | Performed by: NURSE PRACTITIONER

## 2022-07-20 PROCEDURE — 80053 COMPREHEN METABOLIC PANEL: CPT | Performed by: NURSE PRACTITIONER

## 2022-07-20 PROCEDURE — 99214 OFFICE O/P EST MOD 30 MIN: CPT | Performed by: NURSE PRACTITIONER

## 2022-07-20 PROCEDURE — 83036 HEMOGLOBIN GLYCOSYLATED A1C: CPT | Performed by: NURSE PRACTITIONER

## 2022-07-20 PROCEDURE — 87086 URINE CULTURE/COLONY COUNT: CPT | Performed by: NURSE PRACTITIONER

## 2022-07-20 PROCEDURE — 82043 UR ALBUMIN QUANTITATIVE: CPT | Performed by: NURSE PRACTITIONER

## 2022-07-20 PROCEDURE — 80061 LIPID PANEL: CPT | Performed by: NURSE PRACTITIONER

## 2022-07-20 RX ORDER — CHOLECALCIFEROL (VITAMIN D3) 125 MCG
2500 CAPSULE ORAL DAILY
COMMUNITY

## 2022-07-20 NOTE — PROGRESS NOTES
Please mail letter to patient stating and I will also do a MyChart message    Hattie the radiologist read the x-rays of your knee as completely negative--with the following radiologic report:     INDICATIONS:  pt noted swelling below the left knee approx 2 weeks ago    FINDINGS:          BONES:             Normal.  No significant arthropathy or acute abnormality.    SOFT TISSUES:            Negative.  No visible soft tissue swelling.    EFFUSION:       None visible.    OTHER:             Negative.      IMPRESSION:               No acute disease.
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
## 2022-07-20 NOTE — PROGRESS NOTES
..  Venipuncture Blood Specimen Collection  Venipuncture performed in left arm by Eneida Watts with good hemostasis. Patient tolerated the procedure well without complications.   07/20/22   Eneida Watts

## 2022-07-21 LAB
FOLATE SERPL-MCNC: 10.3 NG/ML (ref 4.78–24.2)
VIT B12 BLD-MCNC: 657 PG/ML (ref 211–946)

## 2022-07-22 LAB — BACTERIA SPEC AEROBE CULT: NO GROWTH

## 2022-07-22 NOTE — PROGRESS NOTES
Please mail letter to patient stating    Hattie your folic acid and vitamin B12 and vitamin D were all normal range; thyroid levels were normal range; comprehensive panel shows normal glucose and normal kidney function tests and normal electrolytes and all of the liver function tests were normal with the exception of the ALT modestly elevated at 49 and it should be 33 or less; all of your cholesterol panel was normal range; urine microalbumin was normal range; hemoglobin A1c was completely normal at 5.5%; the urinalysis showed large amount leukocytes and everything else was normal and then microscopically there was 2+ bacteria so normally they do go ahead and send out a reflex urine culture    And then lastly all of your blood counts were normal range

## 2022-08-29 ENCOUNTER — OFFICE VISIT (OUTPATIENT)
Dept: FAMILY MEDICINE CLINIC | Facility: CLINIC | Age: 47
End: 2022-08-29

## 2022-08-29 VITALS
BODY MASS INDEX: 34.81 KG/M2 | DIASTOLIC BLOOD PRESSURE: 80 MMHG | WEIGHT: 206 LBS | OXYGEN SATURATION: 97 % | HEART RATE: 94 BPM | TEMPERATURE: 97.3 F | SYSTOLIC BLOOD PRESSURE: 130 MMHG

## 2022-08-29 DIAGNOSIS — L29.9 PRURITIC DERMATITIS: Primary | ICD-10-CM

## 2022-08-29 PROCEDURE — 99213 OFFICE O/P EST LOW 20 MIN: CPT | Performed by: NURSE PRACTITIONER

## 2022-08-29 RX ORDER — PERMETHRIN 50 MG/G
1 CREAM TOPICAL ONCE
Qty: 1 G | Refills: 0 | Status: SHIPPED | OUTPATIENT
Start: 2022-08-29 | End: 2022-08-29
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
## 2022-09-20 ENCOUNTER — OFFICE VISIT (OUTPATIENT)
Dept: FAMILY MEDICINE CLINIC | Facility: CLINIC | Age: 47
End: 2022-09-20

## 2022-09-20 VITALS
OXYGEN SATURATION: 97 % | DIASTOLIC BLOOD PRESSURE: 62 MMHG | WEIGHT: 205 LBS | HEART RATE: 75 BPM | BODY MASS INDEX: 36.32 KG/M2 | TEMPERATURE: 97.7 F | HEIGHT: 63 IN | SYSTOLIC BLOOD PRESSURE: 120 MMHG

## 2022-09-20 DIAGNOSIS — Z12.11 SCREEN FOR COLON CANCER: ICD-10-CM

## 2022-09-20 DIAGNOSIS — Z00.00 MEDICARE ANNUAL WELLNESS VISIT, SUBSEQUENT: Primary | ICD-10-CM

## 2022-09-20 DIAGNOSIS — Z12.31 SCREENING MAMMOGRAM FOR BREAST CANCER: ICD-10-CM

## 2022-09-20 DIAGNOSIS — G25.81 RESTLESS LEGS SYNDROME: ICD-10-CM

## 2022-09-20 DIAGNOSIS — E66.9 CLASS 2 OBESITY WITHOUT SERIOUS COMORBIDITY WITH BODY MASS INDEX (BMI) OF 36.0 TO 36.9 IN ADULT, UNSPECIFIED OBESITY TYPE: ICD-10-CM

## 2022-09-20 PROCEDURE — 96160 PT-FOCUSED HLTH RISK ASSMT: CPT | Performed by: NURSE PRACTITIONER

## 2022-09-20 PROCEDURE — 1159F MED LIST DOCD IN RCRD: CPT | Performed by: NURSE PRACTITIONER

## 2022-09-20 PROCEDURE — G0439 PPPS, SUBSEQ VISIT: HCPCS | Performed by: NURSE PRACTITIONER

## 2022-09-20 PROCEDURE — 1126F AMNT PAIN NOTED NONE PRSNT: CPT | Performed by: NURSE PRACTITIONER

## 2022-09-20 PROCEDURE — 1170F FXNL STATUS ASSESSED: CPT | Performed by: NURSE PRACTITIONER

## 2022-09-20 RX ORDER — ROPINIROLE 0.5 MG/1
0.5 TABLET, FILM COATED ORAL NIGHTLY
Qty: 30 TABLET | Refills: 5 | Status: SHIPPED | OUTPATIENT
Start: 2022-09-20

## 2022-09-20 NOTE — PROGRESS NOTES
The ABCs of the Annual Wellness Visit  Subsequent Medicare Wellness Visit    Pt also reports having trouble with further wt loss--lost total of approx 60# thus far --and her bariatric surgeon wants her to to be at 200# or less when she F/U in January 2023      Chief Complaint   Patient presents with   • Medicare Wellness-subsequent      Subjective    History of Present Illness:  Hattie Pringle is a 47 y.o. female who presents for a Subsequent Medicare Wellness Visit.    The following portions of the patient's history were reviewed and   updated as appropriate: allergies, current medications, past family history, past medical history, past social history, past surgical history and problem list.    Compared to one year ago, the patient feels her physical   health is better.    Compared to one year ago, the patient feels her mental   health is better.    Recent Hospitalizations:  She was admitted within the past 365 days at [facility]. X 3 days for the gastric sleeve    SCANNED PATHOLOGY (09/28/2016)  DISCHARGE SUMMARY - SCAN - GASTRIC NAVEED RODRIGUEZ , 05/09/2022 (05/09/2022)  OPERATIVE/PROCEDURE REPORT - SCAN - GASTRIC SLEEVENAVEED, 05/02/2022 (05/02/2022)      Current Medical Providers:  Patient Care Team:  Larissa Kevin APRN as PCP - General (Nurse Practitioner)    Outpatient Medications Prior to Visit   Medication Sig Dispense Refill   • albuterol (PROVENTIL) (5 MG/ML) 0.5% nebulizer solution Take 2.5 mg by nebulization Every 6 (Six) Hours As Needed for Wheezing.     • albuterol sulfate  (90 Base) MCG/ACT inhaler INHALE 2 PUFFS EVERY 6 HOURS AS NEEDED FOR WHEEZING OR SHORTNESS OF BREATH 18 g 2   • folic acid (FOLVITE) 1 MG tablet Take 1 tablet by mouth Daily. 30 tablet 11   • Multiple Vitamins-Iron (MULTIVITAMIN/IRON PO) Take  by mouth.     • vitamin B-12 (CYANOCOBALAMIN) 500 MCG tablet Take 500 mcg by mouth Daily. taking 5 tabs daily     • rOPINIRole (REQUIP) 0.5 MG tablet Take 1 tablet by  mouth Every Night. Take 1 hour before bedtime. 30 tablet 5     No facility-administered medications prior to visit.       No opioid medication identified on active medication list. I have reviewed chart for other potential  high risk medication/s and harmful drug interactions in the elderly.          Aspirin is not on active medication list.  Aspirin use is not indicated based on review of current medical condition/s. Risk of harm outweighs potential benefits.  .    Patient Active Problem List   Diagnosis   • Clinical diagnosis of COVID-19   • COPD (chronic obstructive pulmonary disease) ([facility])   • GERD (gastroesophageal reflux disease)   • Chronic GERD   • Arthritis   • Diverticulosis   • Hiatal hernia   • Morbid obesity ([facility])   • RLS (restless legs syndrome)   • Pulmonary nodule   • Anemia   • Asthma   • Prediabetes   • Folic acid deficiency   • S/P laparoscopic sleeve gastrectomy   • Morbid obesity due to excess calories ([facility])   • Mixed hyperlipidemia   • Vitamin D deficiency     Advance Care Planning  Advance Directive is not on file.  ACP discussion was held with the patient during this visit. Patient does not have an advance directive, information provided.    Review of Systems   Constitutional: Negative for fatigue.   HENT: Negative for trouble swallowing.    Eyes: Negative for visual disturbance.   Respiratory: Negative for choking and shortness of breath.    Cardiovascular: Negative for chest pain.   Gastrointestinal: Negative for abdominal pain, blood in stool, constipation, nausea and vomiting.   Endocrine: Negative for polydipsia, polyphagia and polyuria.   Genitourinary: Negative for dysuria, vaginal bleeding, vaginal discharge and vaginal pain.        Partial hysterectomy 2016--and vaginal dryness--not R/T cancer no endometriosis    Musculoskeletal: Positive for arthralgias.        Ankle    Skin: Negative.    Allergic/Immunologic: Positive for environmental allergies.   Neurological: Negative for  "dizziness, seizures, syncope and light-headedness.   Hematological: Does not bruise/bleed easily.   Psychiatric/Behavioral: Negative for dysphoric mood, self-injury and suicidal ideas. The patient is not nervous/anxious.         Objective    Vitals:    09/20/22 0944   BP: 120/62   BP Location: Left arm   Patient Position: Sitting   Cuff Size: Adult   Pulse: 75   Temp: 97.7 °F (36.5 °C)   TempSrc: Temporal   SpO2: 97%   Weight: 93 kg (205 lb)   Height: 160 cm (63\")   PainSc: 0-No pain     Estimated body mass index is 36.31 kg/m² as calculated from the following:    Height as of this encounter: 160 cm (63\").    Weight as of this encounter: 93 kg (205 lb).    Class 2 Severe Obesity (BMI >=35 and <=39.9). Obesity-related health conditions include the following: GERD, osteoarthritis and pre-diabetes in the past . Obesity is improving with lifestyle modifications. BMI is is above average; BMI management plan is completed. We discussed portion control and increasing exercise.      Does the patient have evidence of cognitive impairment? No    Physical Exam  Vitals and nursing note reviewed.   Constitutional:       Appearance: Normal appearance. She is obese.      Comments: Lost approx 60#   HENT:      Head: Normocephalic.      Right Ear: External ear normal.      Left Ear: External ear normal.      Nose: Nose normal.      Mouth/Throat:      Comments: Wearing mask  Eyes:      Pupils: Pupils are equal, round, and reactive to light.   Cardiovascular:      Rate and Rhythm: Normal rate and regular rhythm.      Heart sounds: Normal heart sounds.   Pulmonary:      Effort: Pulmonary effort is normal.      Breath sounds: Normal breath sounds.   Abdominal:      Palpations: Abdomen is soft.   Musculoskeletal:         General: Normal range of motion.      Cervical back: Normal range of motion and neck supple.   Skin:     General: Skin is warm and dry.   Neurological:      Mental Status: She is alert and oriented to person, place, and " time.   Psychiatric:         Mood and Affect: Mood normal.         Behavior: Behavior normal.         Thought Content: Thought content normal.         Judgment: Judgment normal.       Lab Results   Component Value Date    TRIG 115 07/20/2022    HDL 43 07/20/2022    LDL 80 07/20/2022    VLDL 21 07/20/2022    HGBA1C 5.50 07/20/2022            HEALTH RISK ASSESSMENT    Smoking Status:  Social History     Tobacco Use   Smoking Status Former Smoker   • Types: Cigarettes   Smokeless Tobacco Never Used   Tobacco Comment    one pack per week     Alcohol Consumption:  Social History     Substance and Sexual Activity   Alcohol Use Never     Fall Risk Screen:    STEADI Fall Risk Assessment was completed, and patient is at LOW risk for falls.Assessment completed on:9/20/2022    Depression Screening:  PHQ-2/PHQ-9 Depression Screening 9/20/2022   Retired PHQ-9 Total Score -   Retired Total Score -   Little Interest or Pleasure in Doing Things 0-->not at all   Feeling Down, Depressed or Hopeless 1-->several days   PHQ-9: Brief Depression Severity Measure Score 1       Health Habits and Functional and Cognitive Screening:  Functional & Cognitive Status 9/20/2022   Do you have difficulty preparing food and eating? No   Do you have difficulty bathing yourself, getting dressed or grooming yourself? No   Do you have difficulty using the toilet? No   Do you have difficulty moving around from place to place? No   Do you have trouble with steps or getting out of a bed or a chair? No   Current Diet Well Balanced Diet   Dental Exam Up to date   Eye Exam Not up to date   Exercise (times per week) 7 times per week   Current Exercises Include Yard Work;Walking;House Cleaning   Do you need help using the phone?  No   Are you deaf or do you have serious difficulty hearing?  No   Do you need help with transportation? No   Do you need help shopping? No   Do you need help preparing meals?  No   Do you need help with housework?  No   Do you need  help with laundry? No   Do you need help taking your medications? No   Do you need help managing money? No   Do you ever drive or ride in a car without wearing a seat belt? No   Have you felt unusual stress, anger or loneliness in the last month? Yes   Who do you live with? Other   If you need help, do you have trouble finding someone available to you? No   Have you been bothered in the last four weeks by sexual problems? No   Do you have difficulty concentrating, remembering or making decisions? No       Age-appropriate Screening Schedule:  Refer to the list below for future screening recommendations based on patient's age, sex and/or medical conditions. Orders for these recommended tests are listed in the plan section. The patient has been provided with a written plan.    Health Maintenance   Topic Date Due   • INFLUENZA VACCINE  10/01/2022   • HEMOGLOBIN A1C  01/20/2023   • LIPID PANEL  07/20/2023   • URINE MICROALBUMIN  07/20/2023   • TDAP/TD VACCINES (2 - Td or Tdap) 02/23/2032   • DIABETIC FOOT EXAM  Discontinued   • DIABETIC EYE EXAM  Discontinued              Assessment & Plan   CMS Preventative Services Quick Reference  Risk Factors Identified During Encounter  Obesity/Overweight   RLS  The above risks/problems have been discussed with the patient.  Follow up actions/plans if indicated are seen below in the Assessment/Plan Section.  Pertinent information has been shared with the patient in the After Visit Summary.    Diagnoses and all orders for this visit:    1. Medicare annual wellness visit, subsequent (Primary)    2. Restless legs syndrome  -     rOPINIRole (REQUIP) 0.5 MG tablet; Take 1 tablet by mouth Every Night. Take 1 hour before bedtime.  Dispense: 30 tablet; Refill: 5    3. Class 2 obesity without serious comorbidity with body mass index (BMI) of 36.0 to 36.9 in adult, unspecified obesity type  Comments:  counseled diet and exercise and has lost 60#    4. Screening mammogram for breast cancer  -      Mammo Screening Bilateral With CAD; Future    5. Screen for colon cancer  -     Ambulatory Referral For Screening Colonoscopy        Follow Up:   Return if symptoms worsen or fail to improve.     An After Visit Summary and PPPS were made available to the patient.

## 2022-11-03 ENCOUNTER — PREP FOR SURGERY (OUTPATIENT)
Dept: OTHER | Facility: HOSPITAL | Age: 47
End: 2022-11-03

## 2022-11-03 ENCOUNTER — OFFICE VISIT (OUTPATIENT)
Dept: SURGERY | Facility: CLINIC | Age: 47
End: 2022-11-03

## 2022-11-03 VITALS — WEIGHT: 197.8 LBS | HEIGHT: 63 IN | HEART RATE: 66 BPM | BODY MASS INDEX: 35.05 KG/M2

## 2022-11-03 DIAGNOSIS — Z86.010 HISTORY OF COLONIC POLYPS: ICD-10-CM

## 2022-11-03 DIAGNOSIS — Z12.11 SCREENING FOR MALIGNANT NEOPLASM OF COLON: Primary | ICD-10-CM

## 2022-11-03 PROBLEM — Z86.0100 HISTORY OF COLONIC POLYPS: Status: ACTIVE | Noted: 2022-11-03

## 2022-11-03 PROCEDURE — S0260 H&P FOR SURGERY: HCPCS | Performed by: NURSE PRACTITIONER

## 2022-11-03 RX ORDER — SOD SULF/POT CHLORIDE/MAG SULF 1.479 G
24 TABLET ORAL ONCE
Qty: 72 TABLET | Refills: 0 | Status: SHIPPED | OUTPATIENT
Start: 2022-11-03 | End: 2022-11-06
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
## 2022-11-13 ENCOUNTER — TRANSCRIBE ORDERS (OUTPATIENT)
Dept: ADMINISTRATIVE | Facility: HOSPITAL | Age: 47
End: 2022-11-13

## 2022-11-13 DIAGNOSIS — Z12.31 SCREENING MAMMOGRAM FOR BREAST CANCER: Primary | ICD-10-CM

## 2022-11-14 ENCOUNTER — HOSPITAL ENCOUNTER (OUTPATIENT)
Dept: MAMMOGRAPHY | Facility: HOSPITAL | Age: 47
Discharge: HOME | End: 2022-11-14

## 2022-11-14 DIAGNOSIS — Z12.31 SCREENING MAMMOGRAM FOR BREAST CANCER: ICD-10-CM

## 2023-01-03 ENCOUNTER — TELEPHONE (OUTPATIENT)
Dept: SURGERY | Facility: CLINIC | Age: 48
End: 2023-01-03
Payer: MEDICARE

## 2023-01-03 NOTE — TELEPHONE ENCOUNTER
I called and spoke to the Pt. We went back over the bowel prep she was given in the office and she voiced understanding.  is also aware to call the office back with any other questions.

## 2023-01-03 NOTE — TELEPHONE ENCOUNTER
Pt has a question about the bowel prep. She said that she received 3 bottles of the pills but the instructions are for 2 bottles. Please call her and clarify.

## 2023-01-04 ENCOUNTER — APPOINTMENT (OUTPATIENT)
Dept: MAMMOGRAPHY | Facility: HOSPITAL | Age: 48
End: 2023-01-04

## 2023-01-19 ENCOUNTER — HOSPITAL ENCOUNTER (OUTPATIENT)
Dept: MAMMOGRAPHY | Facility: HOSPITAL | Age: 48
Discharge: HOME | End: 2023-01-19
Admitting: NURSE PRACTITIONER
Payer: MEDICARE

## 2023-01-19 PROCEDURE — 77067 SCR MAMMO BI INCL CAD: CPT

## 2023-01-19 PROCEDURE — 77063 BREAST TOMOSYNTHESIS BI: CPT

## 2023-02-23 ENCOUNTER — OFFICE VISIT (OUTPATIENT)
Dept: FAMILY MEDICINE CLINIC | Facility: CLINIC | Age: 48
End: 2023-02-23
Payer: MEDICARE

## 2023-02-23 VITALS
HEIGHT: 63 IN | BODY MASS INDEX: 33.66 KG/M2 | WEIGHT: 190 LBS | HEART RATE: 94 BPM | OXYGEN SATURATION: 97 % | SYSTOLIC BLOOD PRESSURE: 122 MMHG | TEMPERATURE: 97.7 F | DIASTOLIC BLOOD PRESSURE: 72 MMHG

## 2023-02-23 DIAGNOSIS — U07.1 POSITIVE SELF-ADMINISTERED ANTIGEN TEST FOR COVID-19: Primary | ICD-10-CM

## 2023-02-23 DIAGNOSIS — J45.30 MILD PERSISTENT ASTHMA WITHOUT COMPLICATION: ICD-10-CM

## 2023-02-23 DIAGNOSIS — J44.9 CHRONIC OBSTRUCTIVE PULMONARY DISEASE, UNSPECIFIED COPD TYPE: ICD-10-CM

## 2023-02-23 DIAGNOSIS — J01.40 ACUTE NON-RECURRENT PANSINUSITIS: ICD-10-CM

## 2023-02-23 PROCEDURE — 99214 OFFICE O/P EST MOD 30 MIN: CPT | Performed by: NURSE PRACTITIONER

## 2023-02-23 PROCEDURE — U0004 COV-19 TEST NON-CDC HGH THRU: HCPCS | Performed by: NURSE PRACTITIONER

## 2023-02-23 RX ORDER — AMOXICILLIN 875 MG/1
875 TABLET, COATED ORAL 2 TIMES DAILY
Qty: 20 TABLET | Refills: 0 | Status: SHIPPED | OUTPATIENT
Start: 2023-02-23 | End: 2023-03-20

## 2023-02-23 RX ORDER — DEXAMETHASONE 6 MG/1
6 TABLET ORAL DAILY
Qty: 6 TABLET | Refills: 0 | Status: SHIPPED | OUTPATIENT
Start: 2023-02-23 | End: 2023-03-20

## 2023-02-23 RX ORDER — ALBUTEROL SULFATE 90 UG/1
2 AEROSOL, METERED RESPIRATORY (INHALATION) EVERY 6 HOURS PRN
Qty: 18 G | Refills: 2 | Status: SHIPPED | OUTPATIENT
Start: 2023-02-23

## 2023-02-23 RX ORDER — BENZONATATE 100 MG/1
100 CAPSULE ORAL 3 TIMES DAILY PRN
Qty: 30 CAPSULE | Refills: 0 | Status: SHIPPED | OUTPATIENT
Start: 2023-02-23
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
## 2023-02-24 LAB — SARS-COV-2 RNA RESP QL NAA+PROBE: DETECTED

## 2023-03-20 NOTE — PRE-PROCEDURE INSTRUCTIONS
PT INSTRUCTED ON CLEAR LIQ DIET AND PO SPLIT PREP LAST BM SHOULD BE CLEAR   No meds noted  ARRIVAL TIME IS 0930 am ON 3/23/23

## 2023-03-23 ENCOUNTER — HOSPITAL ENCOUNTER (OUTPATIENT)
Facility: HOSPITAL | Age: 48
Setting detail: HOSPITAL OUTPATIENT SURGERY
Discharge: HOME | End: 2023-03-23
Attending: SURGERY | Admitting: SURGERY
Payer: MEDICARE

## 2023-03-23 ENCOUNTER — ANESTHESIA EVENT (OUTPATIENT)
Dept: GASTROENTEROLOGY | Facility: HOSPITAL | Age: 48
End: 2023-03-23
Payer: MEDICARE

## 2023-03-23 ENCOUNTER — ANESTHESIA (OUTPATIENT)
Dept: GASTROENTEROLOGY | Facility: HOSPITAL | Age: 48
End: 2023-03-23
Payer: MEDICARE

## 2023-03-23 VITALS
HEART RATE: 80 BPM | TEMPERATURE: 97.2 F | DIASTOLIC BLOOD PRESSURE: 46 MMHG | OXYGEN SATURATION: 98 % | SYSTOLIC BLOOD PRESSURE: 101 MMHG | BODY MASS INDEX: 33.74 KG/M2 | WEIGHT: 190.48 LBS | RESPIRATION RATE: 16 BRPM

## 2023-03-23 DIAGNOSIS — Z12.11 SCREENING FOR MALIGNANT NEOPLASM OF COLON: ICD-10-CM

## 2023-03-23 DIAGNOSIS — Z86.010 HISTORY OF COLONIC POLYPS: ICD-10-CM

## 2023-03-23 PROCEDURE — 25010000002 PROPOFOL 10 MG/ML EMULSION: Performed by: NURSE ANESTHETIST, CERTIFIED REGISTERED

## 2023-03-23 PROCEDURE — 88305 TISSUE EXAM BY PATHOLOGIST: CPT | Performed by: SURGERY

## 2023-03-23 RX ORDER — LIDOCAINE HYDROCHLORIDE 20 MG/ML
INJECTION, SOLUTION EPIDURAL; INFILTRATION; INTRACAUDAL; PERINEURAL AS NEEDED
Status: DISCONTINUED | OUTPATIENT
Start: 2023-03-23 | End: 2023-03-23 | Stop reason: SURG

## 2023-03-23 RX ORDER — SODIUM CHLORIDE, SODIUM LACTATE, POTASSIUM CHLORIDE, CALCIUM CHLORIDE 600; 310; 30; 20 MG/100ML; MG/100ML; MG/100ML; MG/100ML
30 INJECTION, SOLUTION INTRAVENOUS CONTINUOUS
Status: DISCONTINUED | OUTPATIENT
Start: 2023-03-23 | End: 2023-03-23 | Stop reason: HOSPADM

## 2023-03-23 RX ORDER — PROPOFOL 10 MG/ML
VIAL (ML) INTRAVENOUS AS NEEDED
Status: DISCONTINUED | OUTPATIENT
Start: 2023-03-23 | End: 2023-03-23 | Stop reason: SURG

## 2023-03-23 RX ADMIN — PROPOFOL 100 MG: 10 INJECTION, EMULSION INTRAVENOUS at 10:06

## 2023-03-23 RX ADMIN — SODIUM CHLORIDE, POTASSIUM CHLORIDE, SODIUM LACTATE AND CALCIUM CHLORIDE: 600; 310; 30; 20 INJECTION, SOLUTION INTRAVENOUS at 10:03

## 2023-03-23 RX ADMIN — PROPOFOL 200 MCG/KG/MIN: 10 INJECTION, EMULSION INTRAVENOUS at 10:06

## 2023-03-23 RX ADMIN — PROPOFOL 60 MG: 10 INJECTION, EMULSION INTRAVENOUS at 10:21

## 2023-03-23 RX ADMIN — LIDOCAINE HYDROCHLORIDE 100 MG: 20 INJECTION, SOLUTION EPIDURAL; INFILTRATION; INTRACAUDAL; PERINEURAL at 10:06
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
## 2023-03-23 NOTE — ANESTHESIA PREPROCEDURE EVALUATION
Anesthesia Evaluation     Patient summary reviewed and Nursing notes reviewed   no history of anesthetic complications:  NPO Solid Status: > 8 hours  NPO Liquid Status: > 2 hours           Airway   Mallampati: II  TM distance: >3 FB  Neck ROM: full  No difficulty expected  Dental      Pulmonary - normal exam    breath sounds clear to auscultation  (+) pneumonia , COPD, asthma,  Cardiovascular - normal exam  Exercise tolerance: good (4-7 METS)    Rhythm: regular  Rate: normal    (+) hyperlipidemia,       Neuro/Psych- negative ROS  GI/Hepatic/Renal/Endo    (+)  hiatal hernia, GERD,      Musculoskeletal     Abdominal    Substance History - negative use     OB/GYN negative ob/gyn ROS         Other   arthritis,      ROS/Med Hx Other: PAT Nursing Notes unavailable.                   Anesthesia Plan    ASA 2     general     (Total IV Anesthesia    Patient understands anesthesia not responsible for dental damage.  )  intravenous induction     Anesthetic plan, risks, benefits, and alternatives have been provided, discussed and informed consent has been obtained with: patient.    Plan discussed with CRNA.        CODE STATUS:

## 2023-03-23 NOTE — ANESTHESIA POSTPROCEDURE EVALUATION
Patient: Hattie Pringle    Procedure Summary     Date: 03/23/23 Room / Location: [facility] ENDOSCOPY 1 / [facility] ENDOSCOPY    Anesthesia Start: 1003 Anesthesia Stop: 1038    Procedure: COLONOSCOPY with possible biopies. Diagnosis:       Screening for malignant neoplasm of colon      History of colonic polyps      (Screening for malignant neoplasm of colon [Z12.11])      (History of colonic polyps [Z86.010])    Surgeons: Olvin Lima MD Provider: Miguel Ángel Sommer MD    Anesthesia Type: general ASA Status: 2          Anesthesia Type: general    Vitals  Vitals Value Taken Time   /76 03/23/23 1048   Temp 36.2 °C (97.2 °F) 03/23/23 1037   Pulse 72 03/23/23 1052   Resp 14 03/23/23 1040   SpO2 99 % 03/23/23 1052   Vitals shown include unvalidated device data.        Post Anesthesia Care and Evaluation    Patient location during evaluation: bedside  Patient participation: complete - patient participated  Level of consciousness: awake  Pain management: adequate    Airway patency: patent  PONV Status: none  Cardiovascular status: acceptable and stable  Respiratory status: acceptable  Hydration status: acceptable    Comments: An Anesthesiologist personally participated in the most demanding procedures (including induction and emergence if applicable) in the anesthesia plan, monitored the course of anesthesia administration at frequent intervals and remained physically present and available for immediate diagnosis and treatment of emergencies.

## 2023-03-27 LAB
CYTO UR: NORMAL
LAB AP CASE REPORT: NORMAL
LAB AP CLINICAL INFORMATION: NORMAL
PATH REPORT.FINAL DX SPEC: NORMAL
PATH REPORT.GROSS SPEC: NORMAL

## 2023-08-09 ENCOUNTER — OFFICE VISIT (OUTPATIENT)
Dept: FAMILY MEDICINE CLINIC | Facility: CLINIC | Age: 48
End: 2023-08-09
Payer: MEDICARE

## 2023-08-09 VITALS
DIASTOLIC BLOOD PRESSURE: 80 MMHG | OXYGEN SATURATION: 99 % | WEIGHT: 197 LBS | BODY MASS INDEX: 34.9 KG/M2 | SYSTOLIC BLOOD PRESSURE: 120 MMHG | HEART RATE: 113 BPM | TEMPERATURE: 97.4 F

## 2023-08-09 DIAGNOSIS — R09.81 NASAL CONGESTION WITH RHINORRHEA: ICD-10-CM

## 2023-08-09 DIAGNOSIS — R68.83 CHILLS: ICD-10-CM

## 2023-08-09 DIAGNOSIS — J02.9 ACUTE PHARYNGITIS, UNSPECIFIED ETIOLOGY: ICD-10-CM

## 2023-08-09 DIAGNOSIS — J34.89 NASAL CONGESTION WITH RHINORRHEA: ICD-10-CM

## 2023-08-09 DIAGNOSIS — R05.1 ACUTE COUGH: ICD-10-CM

## 2023-08-09 DIAGNOSIS — J34.89 NASAL CONGESTION WITH RHINORRHEA: Primary | ICD-10-CM

## 2023-08-09 DIAGNOSIS — R09.81 NASAL CONGESTION WITH RHINORRHEA: Primary | ICD-10-CM

## 2023-08-09 LAB
EXPIRATION DATE: NORMAL
EXPIRATION DATE: NORMAL
FLUAV AG UPPER RESP QL IA.RAPID: NOT DETECTED
FLUBV AG UPPER RESP QL IA.RAPID: NOT DETECTED
INTERNAL CONTROL: NORMAL
INTERNAL CONTROL: NORMAL
Lab: NORMAL
Lab: NORMAL
S PYO AG THROAT QL: NEGATIVE
SARS-COV-2 AG UPPER RESP QL IA.RAPID: NOT DETECTED
SARS-COV-2 RNA RESP QL NAA+PROBE: NOT DETECTED

## 2023-08-09 PROCEDURE — 87428 SARSCOV & INF VIR A&B AG IA: CPT | Performed by: NURSE PRACTITIONER

## 2023-08-09 PROCEDURE — 87635 SARS-COV-2 COVID-19 AMP PRB: CPT | Performed by: NURSE PRACTITIONER

## 2023-08-09 PROCEDURE — 87880 STREP A ASSAY W/OPTIC: CPT | Performed by: NURSE PRACTITIONER

## 2023-08-09 PROCEDURE — 99213 OFFICE O/P EST LOW 20 MIN: CPT | Performed by: NURSE PRACTITIONER

## 2023-08-09 RX ORDER — FLUTICASONE PROPIONATE 50 MCG
2 SPRAY, SUSPENSION (ML) NASAL DAILY
Qty: 16 G | Refills: 0 | Status: SHIPPED | OUTPATIENT
Start: 2023-08-09

## 2023-08-09 RX ORDER — BENZONATATE 100 MG/1
100 CAPSULE ORAL 3 TIMES DAILY PRN
Qty: 30 CAPSULE | Refills: 0 | Status: SHIPPED | OUTPATIENT
Start: 2023-08-09

## 2023-08-09 RX ORDER — CETIRIZINE HYDROCHLORIDE 10 MG/1
10 TABLET ORAL DAILY
Qty: 30 TABLET | Refills: 0 | Status: SHIPPED | OUTPATIENT
Start: 2023-08-09

## 2023-08-09 RX ORDER — FLUTICASONE PROPIONATE 50 MCG
SPRAY, SUSPENSION (ML) NASAL
Qty: 48 G | OUTPATIENT
Start: 2023-08-09
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
## 2023-10-06 ENCOUNTER — APPOINTMENT (OUTPATIENT)
Dept: GENERAL RADIOLOGY | Facility: HOSPITAL | Age: 48
End: 2023-10-06
Payer: MEDICARE

## 2023-10-06 ENCOUNTER — HOSPITAL ENCOUNTER (EMERGENCY)
Facility: HOSPITAL | Age: 48
Discharge: HOME | End: 2023-10-06
Attending: EMERGENCY MEDICINE
Payer: MEDICARE

## 2023-10-06 VITALS
TEMPERATURE: 97.9 F | DIASTOLIC BLOOD PRESSURE: 71 MMHG | HEIGHT: 63 IN | BODY MASS INDEX: 34.92 KG/M2 | RESPIRATION RATE: 18 BRPM | OXYGEN SATURATION: 96 % | WEIGHT: 197.09 LBS | HEART RATE: 99 BPM | SYSTOLIC BLOOD PRESSURE: 116 MMHG

## 2023-10-06 DIAGNOSIS — J20.9 ACUTE BRONCHITIS, UNSPECIFIED ORGANISM: ICD-10-CM

## 2023-10-06 DIAGNOSIS — J45.901 ASTHMA WITH ACUTE EXACERBATION, UNSPECIFIED ASTHMA SEVERITY, UNSPECIFIED WHETHER PERSISTENT: Primary | ICD-10-CM

## 2023-10-06 LAB
ALBUMIN SERPL-MCNC: 3.9 G/DL (ref 3.5–5.2)
ALBUMIN/GLOB SERPL: 1.4 G/DL
ALP SERPL-CCNC: 73 U/L (ref 39–117)
ALT SERPL W P-5'-P-CCNC: 21 U/L (ref 1–33)
ANION GAP SERPL CALCULATED.3IONS-SCNC: 9.3 MMOL/L (ref 5–15)
AST SERPL-CCNC: 13 U/L (ref 1–32)
BASOPHILS # BLD AUTO: 0.07 10*3/MM3 (ref 0–0.2)
BASOPHILS NFR BLD AUTO: 0.6 % (ref 0–1.5)
BILIRUB SERPL-MCNC: <0.2 MG/DL (ref 0–1.2)
BUN SERPL-MCNC: 13 MG/DL (ref 6–20)
BUN/CREAT SERPL: 17.3 (ref 7–25)
CALCIUM SPEC-SCNC: 9.1 MG/DL (ref 8.6–10.5)
CHLORIDE SERPL-SCNC: 105 MMOL/L (ref 98–107)
CO2 SERPL-SCNC: 25.7 MMOL/L (ref 22–29)
CREAT SERPL-MCNC: 0.75 MG/DL (ref 0.57–1)
DEPRECATED RDW RBC AUTO: 41.1 FL (ref 37–54)
EGFRCR SERPLBLD CKD-EPI 2021: 99.6 ML/MIN/1.73
EOSINOPHIL # BLD AUTO: 0.2 10*3/MM3 (ref 0–0.4)
EOSINOPHIL NFR BLD AUTO: 1.7 % (ref 0.3–6.2)
ERYTHROCYTE [DISTWIDTH] IN BLOOD BY AUTOMATED COUNT: 12.3 % (ref 12.3–15.4)
GLOBULIN UR ELPH-MCNC: 2.7 GM/DL
GLUCOSE SERPL-MCNC: 109 MG/DL (ref 65–99)
HCT VFR BLD AUTO: 39.3 % (ref 34–46.6)
HGB BLD-MCNC: 12.8 G/DL (ref 12–15.9)
HOLD SPECIMEN: NORMAL
HOLD SPECIMEN: NORMAL
IMM GRANULOCYTES # BLD AUTO: 0.07 10*3/MM3 (ref 0–0.05)
IMM GRANULOCYTES NFR BLD AUTO: 0.6 % (ref 0–0.5)
LYMPHOCYTES # BLD AUTO: 2.32 10*3/MM3 (ref 0.7–3.1)
LYMPHOCYTES NFR BLD AUTO: 19.1 % (ref 19.6–45.3)
MCH RBC QN AUTO: 29.9 PG (ref 26.6–33)
MCHC RBC AUTO-ENTMCNC: 32.6 G/DL (ref 31.5–35.7)
MCV RBC AUTO: 91.8 FL (ref 79–97)
MONOCYTES # BLD AUTO: 0.86 10*3/MM3 (ref 0.1–0.9)
MONOCYTES NFR BLD AUTO: 7.1 % (ref 5–12)
NEUTROPHILS NFR BLD AUTO: 70.9 % (ref 42.7–76)
NEUTROPHILS NFR BLD AUTO: 8.6 10*3/MM3 (ref 1.7–7)
NRBC BLD AUTO-RTO: 0 /100 WBC (ref 0–0.2)
NT-PROBNP SERPL-MCNC: <36 PG/ML (ref 0–450)
PLATELET # BLD AUTO: 362 10*3/MM3 (ref 140–450)
PMV BLD AUTO: 8 FL (ref 6–12)
POTASSIUM SERPL-SCNC: 3.8 MMOL/L (ref 3.5–5.2)
PROT SERPL-MCNC: 6.6 G/DL (ref 6–8.5)
QT INTERVAL: 338 MS
QTC INTERVAL: 438 MS
RBC # BLD AUTO: 4.28 10*6/MM3 (ref 3.77–5.28)
SODIUM SERPL-SCNC: 140 MMOL/L (ref 136–145)
TROPONIN T SERPL HS-MCNC: <6 NG/L
WBC NRBC COR # BLD: 12.12 10*3/MM3 (ref 3.4–10.8)
WHOLE BLOOD HOLD COAG: NORMAL
WHOLE BLOOD HOLD SPECIMEN: NORMAL

## 2023-10-06 PROCEDURE — 85025 COMPLETE CBC W/AUTO DIFF WBC: CPT

## 2023-10-06 PROCEDURE — 71045 X-RAY EXAM CHEST 1 VIEW: CPT

## 2023-10-06 PROCEDURE — 93005 ELECTROCARDIOGRAM TRACING: CPT

## 2023-10-06 PROCEDURE — 36415 COLL VENOUS BLD VENIPUNCTURE: CPT

## 2023-10-06 PROCEDURE — 93005 ELECTROCARDIOGRAM TRACING: CPT | Performed by: EMERGENCY MEDICINE

## 2023-10-06 PROCEDURE — 83880 ASSAY OF NATRIURETIC PEPTIDE: CPT

## 2023-10-06 PROCEDURE — 94640 AIRWAY INHALATION TREATMENT: CPT

## 2023-10-06 PROCEDURE — 99284 EMERGENCY DEPT VISIT MOD MDM: CPT

## 2023-10-06 PROCEDURE — 80053 COMPREHEN METABOLIC PANEL: CPT

## 2023-10-06 PROCEDURE — 94799 UNLISTED PULMONARY SVC/PX: CPT

## 2023-10-06 PROCEDURE — 84484 ASSAY OF TROPONIN QUANT: CPT

## 2023-10-06 PROCEDURE — 63710000001 PREDNISONE PER 5 MG: Performed by: EMERGENCY MEDICINE

## 2023-10-06 RX ORDER — SODIUM CHLORIDE 0.9 % (FLUSH) 0.9 %
10 SYRINGE (ML) INJECTION AS NEEDED
Status: DISCONTINUED | OUTPATIENT
Start: 2023-10-06 | End: 2023-10-07 | Stop reason: HOSPADM

## 2023-10-06 RX ORDER — AZITHROMYCIN 250 MG/1
500 TABLET, FILM COATED ORAL ONCE
Status: COMPLETED | OUTPATIENT
Start: 2023-10-06 | End: 2023-10-06

## 2023-10-06 RX ORDER — PREDNISONE 50 MG/1
50 TABLET ORAL DAILY
Qty: 4 TABLET | Refills: 0 | Status: SHIPPED | OUTPATIENT
Start: 2023-10-06

## 2023-10-06 RX ORDER — AZITHROMYCIN 250 MG/1
TABLET, FILM COATED ORAL
Qty: 6 TABLET | Refills: 0 | Status: SHIPPED | OUTPATIENT
Start: 2023-10-06 | End: 2023-10-10

## 2023-10-06 RX ORDER — IPRATROPIUM BROMIDE AND ALBUTEROL SULFATE 2.5; .5 MG/3ML; MG/3ML
3 SOLUTION RESPIRATORY (INHALATION) ONCE
Status: COMPLETED | OUTPATIENT
Start: 2023-10-06 | End: 2023-10-06

## 2023-10-06 RX ADMIN — IPRATROPIUM BROMIDE AND ALBUTEROL SULFATE 3 ML: .5; 3 SOLUTION RESPIRATORY (INHALATION) at 22:42

## 2023-10-06 RX ADMIN — PREDNISONE 60 MG: 50 TABLET ORAL at 22:41

## 2023-10-06 RX ADMIN — AZITHROMYCIN DIHYDRATE 500 MG: 250 TABLET, FILM COATED ORAL at 22:41

## 2023-10-06 NOTE — ED TRIAGE NOTES
Right sided chest pain and pt reports her right ear hurts also. Pt states no nausea and pain also radiates to the right upper back. NSR on monitor pt reports frequent cough no reported fever.
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
## 2023-10-16 LAB
QT INTERVAL: 338 MS
QTC INTERVAL: 438 MS

## 2023-12-18 ENCOUNTER — TRANSCRIBE ORDERS (OUTPATIENT)
Dept: ADMINISTRATIVE | Facility: HOSPITAL | Age: 48
End: 2023-12-18
Payer: MEDICARE

## 2023-12-18 DIAGNOSIS — Z12.31 VISIT FOR SCREENING MAMMOGRAM: Primary | ICD-10-CM

## 2024-01-02 ENCOUNTER — OFFICE VISIT (OUTPATIENT)
Dept: FAMILY MEDICINE CLINIC | Facility: CLINIC | Age: 49
End: 2024-01-02
Payer: MEDICARE

## 2024-01-02 VITALS
BODY MASS INDEX: 35.61 KG/M2 | HEIGHT: 63 IN | DIASTOLIC BLOOD PRESSURE: 70 MMHG | WEIGHT: 201 LBS | TEMPERATURE: 96.8 F | SYSTOLIC BLOOD PRESSURE: 120 MMHG | OXYGEN SATURATION: 97 % | HEART RATE: 92 BPM

## 2024-01-02 DIAGNOSIS — R53.83 FATIGUE, UNSPECIFIED TYPE: ICD-10-CM

## 2024-01-02 DIAGNOSIS — E55.9 VITAMIN D DEFICIENCY: ICD-10-CM

## 2024-01-02 DIAGNOSIS — E78.2 MIXED HYPERLIPIDEMIA: ICD-10-CM

## 2024-01-02 DIAGNOSIS — J45.30 MILD PERSISTENT ASTHMA WITHOUT COMPLICATION: ICD-10-CM

## 2024-01-02 DIAGNOSIS — G25.81 RESTLESS LEGS SYNDROME: ICD-10-CM

## 2024-01-02 DIAGNOSIS — Z13.220 SCREENING, LIPID: ICD-10-CM

## 2024-01-02 DIAGNOSIS — R73.03 PREDIABETES: ICD-10-CM

## 2024-01-02 DIAGNOSIS — E53.8 FOLIC ACID DEFICIENCY: ICD-10-CM

## 2024-01-02 DIAGNOSIS — Z00.00 MEDICARE ANNUAL WELLNESS VISIT, SUBSEQUENT: Primary | ICD-10-CM

## 2024-01-02 LAB
25(OH)D3 SERPL-MCNC: 25.5 NG/ML (ref 30–100)
ALBUMIN SERPL-MCNC: 4.1 G/DL (ref 3.5–5.2)
ALBUMIN UR-MCNC: <1.2 MG/DL
ALBUMIN/GLOB SERPL: 1.5 G/DL
ALP SERPL-CCNC: 59 U/L (ref 39–117)
ALT SERPL W P-5'-P-CCNC: 30 U/L (ref 1–33)
ANION GAP SERPL CALCULATED.3IONS-SCNC: 10.5 MMOL/L (ref 5–15)
AST SERPL-CCNC: 13 U/L (ref 1–32)
BACTERIA UR QL AUTO: ABNORMAL /HPF
BASOPHILS # BLD AUTO: 0.05 10*3/MM3 (ref 0–0.2)
BASOPHILS NFR BLD AUTO: 0.6 % (ref 0–1.5)
BILIRUB SERPL-MCNC: 0.5 MG/DL (ref 0–1.2)
BILIRUB UR QL STRIP: NEGATIVE
BUN SERPL-MCNC: 14 MG/DL (ref 6–20)
BUN/CREAT SERPL: 18.4 (ref 7–25)
CALCIUM SPEC-SCNC: 9.3 MG/DL (ref 8.6–10.5)
CHLORIDE SERPL-SCNC: 104 MMOL/L (ref 98–107)
CHOLEST SERPL-MCNC: 149 MG/DL (ref 0–200)
CLARITY UR: CLEAR
CO2 SERPL-SCNC: 23.5 MMOL/L (ref 22–29)
COLOR UR: YELLOW
CREAT SERPL-MCNC: 0.76 MG/DL (ref 0.57–1)
DEPRECATED RDW RBC AUTO: 40.2 FL (ref 37–54)
EGFRCR SERPLBLD CKD-EPI 2021: 98 ML/MIN/1.73
EOSINOPHIL # BLD AUTO: 0.12 10*3/MM3 (ref 0–0.4)
EOSINOPHIL NFR BLD AUTO: 1.5 % (ref 0.3–6.2)
ERYTHROCYTE [DISTWIDTH] IN BLOOD BY AUTOMATED COUNT: 12.4 % (ref 12.3–15.4)
FOLATE SERPL-MCNC: 4.32 NG/ML (ref 4.78–24.2)
GLOBULIN UR ELPH-MCNC: 2.7 GM/DL
GLUCOSE SERPL-MCNC: 89 MG/DL (ref 65–99)
GLUCOSE UR STRIP-MCNC: NEGATIVE MG/DL
HBA1C MFR BLD: 5.2 % (ref 4.8–5.6)
HCT VFR BLD AUTO: 40.7 % (ref 34–46.6)
HDLC SERPL-MCNC: 56 MG/DL (ref 40–60)
HGB BLD-MCNC: 13.7 G/DL (ref 12–15.9)
HGB UR QL STRIP.AUTO: NEGATIVE
HOLD SPECIMEN: NORMAL
HYALINE CASTS UR QL AUTO: ABNORMAL /LPF
IMM GRANULOCYTES # BLD AUTO: 0.02 10*3/MM3 (ref 0–0.05)
IMM GRANULOCYTES NFR BLD AUTO: 0.3 % (ref 0–0.5)
KETONES UR QL STRIP: NEGATIVE
LDLC SERPL CALC-MCNC: 76 MG/DL (ref 0–100)
LDLC/HDLC SERPL: 1.35 {RATIO}
LEUKOCYTE ESTERASE UR QL STRIP.AUTO: ABNORMAL
LYMPHOCYTES # BLD AUTO: 2.27 10*3/MM3 (ref 0.7–3.1)
LYMPHOCYTES NFR BLD AUTO: 29.3 % (ref 19.6–45.3)
MCH RBC QN AUTO: 30 PG (ref 26.6–33)
MCHC RBC AUTO-ENTMCNC: 33.7 G/DL (ref 31.5–35.7)
MCV RBC AUTO: 89.3 FL (ref 79–97)
MONOCYTES # BLD AUTO: 0.5 10*3/MM3 (ref 0.1–0.9)
MONOCYTES NFR BLD AUTO: 6.5 % (ref 5–12)
NEUTROPHILS NFR BLD AUTO: 4.79 10*3/MM3 (ref 1.7–7)
NEUTROPHILS NFR BLD AUTO: 61.8 % (ref 42.7–76)
NITRITE UR QL STRIP: NEGATIVE
NRBC BLD AUTO-RTO: 0 /100 WBC (ref 0–0.2)
PH UR STRIP.AUTO: 7 [PH] (ref 5–8)
PLATELET # BLD AUTO: 314 10*3/MM3 (ref 140–450)
PMV BLD AUTO: 8.2 FL (ref 6–12)
POTASSIUM SERPL-SCNC: 4.3 MMOL/L (ref 3.5–5.2)
PROT SERPL-MCNC: 6.8 G/DL (ref 6–8.5)
PROT UR QL STRIP: NEGATIVE
RBC # BLD AUTO: 4.56 10*6/MM3 (ref 3.77–5.28)
RBC # UR STRIP: ABNORMAL /HPF
REF LAB TEST METHOD: ABNORMAL
SODIUM SERPL-SCNC: 138 MMOL/L (ref 136–145)
SP GR UR STRIP: 1.01 (ref 1–1.03)
SQUAMOUS #/AREA URNS HPF: ABNORMAL /HPF
TRIGL SERPL-MCNC: 88 MG/DL (ref 0–150)
TSH SERPL DL<=0.05 MIU/L-ACNC: 1.46 UIU/ML (ref 0.27–4.2)
UROBILINOGEN UR QL STRIP: ABNORMAL
VIT B12 BLD-MCNC: 327 PG/ML (ref 211–946)
VLDLC SERPL-MCNC: 17 MG/DL (ref 5–40)
WBC # UR STRIP: ABNORMAL /HPF
WBC NRBC COR # BLD AUTO: 7.75 10*3/MM3 (ref 3.4–10.8)

## 2024-01-02 PROCEDURE — 82043 UR ALBUMIN QUANTITATIVE: CPT | Performed by: NURSE PRACTITIONER

## 2024-01-02 PROCEDURE — 80061 LIPID PANEL: CPT | Performed by: NURSE PRACTITIONER

## 2024-01-02 PROCEDURE — 81001 URINALYSIS AUTO W/SCOPE: CPT | Performed by: NURSE PRACTITIONER

## 2024-01-02 PROCEDURE — 82746 ASSAY OF FOLIC ACID SERUM: CPT | Performed by: NURSE PRACTITIONER

## 2024-01-02 PROCEDURE — 82607 VITAMIN B-12: CPT | Performed by: NURSE PRACTITIONER

## 2024-01-02 PROCEDURE — 83036 HEMOGLOBIN GLYCOSYLATED A1C: CPT | Performed by: NURSE PRACTITIONER

## 2024-01-02 PROCEDURE — 84443 ASSAY THYROID STIM HORMONE: CPT | Performed by: NURSE PRACTITIONER

## 2024-01-02 PROCEDURE — 87086 URINE CULTURE/COLONY COUNT: CPT | Performed by: NURSE PRACTITIONER

## 2024-01-02 PROCEDURE — 85025 COMPLETE CBC W/AUTO DIFF WBC: CPT | Performed by: NURSE PRACTITIONER

## 2024-01-02 PROCEDURE — 80053 COMPREHEN METABOLIC PANEL: CPT | Performed by: NURSE PRACTITIONER

## 2024-01-02 PROCEDURE — 82306 VITAMIN D 25 HYDROXY: CPT | Performed by: NURSE PRACTITIONER

## 2024-01-02 RX ORDER — ROPINIROLE 0.5 MG/1
0.5 TABLET, FILM COATED ORAL NIGHTLY
Qty: 90 TABLET | Refills: 1 | Status: SHIPPED | OUTPATIENT
Start: 2024-01-02

## 2024-01-02 RX ORDER — ALBUTEROL SULFATE 90 UG/1
2 AEROSOL, METERED RESPIRATORY (INHALATION) EVERY 6 HOURS PRN
Qty: 18 G | Refills: 2 | Status: SHIPPED | OUTPATIENT
Start: 2024-01-02

## 2024-01-02 NOTE — PROGRESS NOTES
Venipuncture Blood Specimen Collection  Venipuncture performed in left arm by Norma Crockett with good hemostasis. Patient tolerated the procedure well without complications.   01/02/24   Norma Crockett

## 2024-01-02 NOTE — PROGRESS NOTES
The ABCs of the Annual Wellness Visit  Subsequent Medicare Wellness Visit    Subjective    Hattie Pringle is a 48 y.o. female who presents for a Subsequent Medicare Wellness Visit.    The following portions of the patient's history were reviewed and   updated as appropriate: allergies, current medications, past family history, past medical history, past social history, past surgical history, and problem list.    Compared to one year ago, the patient feels her physical   health is the same.    Compared to one year ago, the patient feels her mental   health is the same.    Recent Hospitalizations:  She was not admitted to the hospital during the last year.       Current Medical Providers:  Patient Care Team:  Larissa Kevin APRN as PCP - General (Nurse Practitioner)  Lauren De La Fuente APRN as Nurse Practitioner (Nurse Practitioner)    Outpatient Medications Prior to Visit   Medication Sig Dispense Refill    Multiple Vitamins-Iron (MULTIVITAMIN/IRON PO) Take 1 tablet by mouth Daily.      vitamin B-12 (CYANOCOBALAMIN) 500 MCG tablet Take 5 tablets by mouth Daily. taking 5 tabs daily      albuterol sulfate  (90 Base) MCG/ACT inhaler Inhale 2 puffs Every 6 (Six) Hours As Needed for Wheezing or Shortness of Air. 18 g 2    rOPINIRole (REQUIP) 0.5 MG tablet Take 1 tablet by mouth Every Night. Take 1 hour before bedtime. (Patient taking differently: Take 1 tablet by mouth Every Night. Take 1 hour before bedtime. Not taking) 30 tablet 5    albuterol (PROVENTIL) (5 MG/ML) 0.5% nebulizer solution Take 0.5 mL by nebulization Every 6 (Six) Hours As Needed for Wheezing.      benzonatate (Tessalon Perles) 100 MG capsule Take 1 capsule by mouth 3 (Three) Times a Day As Needed for Cough. 30 capsule 0    cetirizine (zyrTEC) 10 MG tablet Take 1 tablet by mouth Daily. 30 tablet 0    fluticasone (FLONASE) 50 MCG/ACT nasal spray 2 sprays into the nostril(s) as directed by provider Daily. 16 g 0    predniSONE (DELTASONE) 50 MG  "tablet Take 1 tablet by mouth Daily. 4 tablet 0     No facility-administered medications prior to visit.       No opioid medication identified on active medication list. I have reviewed chart for other potential  high risk medication/s and harmful drug interactions in the elderly.        Aspirin is not on active medication list.  Aspirin use is not indicated based on review of current medical condition/s. Risk of harm outweighs potential benefits.  .    Patient Active Problem List   Diagnosis    Clinical diagnosis of COVID-19    GERD (gastroesophageal reflux disease)    Chronic GERD    Arthritis    Diverticulosis    Hiatal hernia    Morbid obesity    Restless legs syndrome    Pulmonary nodule    Anemia    Asthma    Prediabetes    Folic acid deficiency    S/P laparoscopic sleeve gastrectomy    Morbid obesity due to excess calories    Mixed hyperlipidemia    Vitamin D deficiency    Screening for malignant neoplasm of colon    History of colonic polyps     Advance Care Planning   Advance Care Planning     Advance Directive is not on file.  ACP discussion was held with the patient during this visit. Patient does not have an advance directive, declines further assistance.     Objective    Vitals:    01/02/24 1116   BP: 120/70   Pulse: 92   Temp: 96.8 °F (36 °C)   TempSrc: Temporal   SpO2: 97%   Weight: 91.2 kg (201 lb)   Height: 160 cm (63\")   PainSc: 0-No pain     Estimated body mass index is 35.61 kg/m² as calculated from the following:    Height as of this encounter: 160 cm (63\").    Weight as of this encounter: 91.2 kg (201 lb).           Does the patient have evidence of cognitive impairment? No          HEALTH RISK ASSESSMENT    Smoking Status:  Social History     Tobacco Use   Smoking Status Former    Packs/day: 1.00    Years: 20.00    Additional pack years: 0.00    Total pack years: 20.00    Types: Cigarettes    Quit date: 2021    Years since quitting: 3.0   Smokeless Tobacco Never   Tobacco Comments    one pack " per week     Alcohol Consumption:  Social History     Substance and Sexual Activity   Alcohol Use Never     Fall Risk Screen:    BENTLEY Fall Risk Assessment was completed, and patient is at LOW risk for falls.Assessment completed on:2024    Depression Screenin/2/2024    11:19 AM   PHQ-2/PHQ-9 Depression Screening   Little Interest or Pleasure in Doing Things 0-->not at all   Feeling Down, Depressed or Hopeless 0-->not at all   PHQ-9: Brief Depression Severity Measure Score 0       Health Habits and Functional and Cognitive Screenin/2/2024    11:17 AM   Functional & Cognitive Status   Do you have difficulty preparing food and eating? No   Do you have difficulty bathing yourself, getting dressed or grooming yourself? No   Do you have difficulty using the toilet? No   Do you have difficulty moving around from place to place? No   Do you have trouble with steps or getting out of a bed or a chair? No   Current Diet Limited Junk Food   Dental Exam Not up to date   Eye Exam Not up to date   Exercise (times per week) 7 times per week   Current Exercises Include Yard Work;Walking;House Cleaning   Do you need help using the phone?  No   Are you deaf or do you have serious difficulty hearing?  No   Do you need help to go to places out of walking distance? No   Do you need help shopping? No   Do you need help preparing meals?  No   Do you need help with housework?  No   Do you need help with laundry? No   Do you need help taking your medications? No   Do you need help managing money? No   Do you ever drive or ride in a car without wearing a seat belt? No   Have you felt unusual stress, anger or loneliness in the last month? No   Who do you live with? Other   If you need help, do you have trouble finding someone available to you? No   Have you been bothered in the last four weeks by sexual problems? No   Do you have difficulty concentrating, remembering or making decisions? Yes       Age-appropriate  Screening Schedule:  Refer to the list below for future screening recommendations based on patient's age, sex and/or medical conditions. Orders for these recommended tests are listed in the plan section. The patient has been provided with a written plan.    Health Maintenance   Topic Date Due    LIPID PANEL  07/20/2023    COVID-19 Vaccine (7 - 2023-24 season) 09/01/2023    BMI FOLLOWUP  08/09/2024    ANNUAL WELLNESS VISIT  01/02/2025    TDAP/TD VACCINES (2 - Td or Tdap) 02/23/2032    COLORECTAL CANCER SCREENING  03/23/2033    HEPATITIS C SCREENING  Completed    Pneumococcal Vaccine 0-64  Completed    INFLUENZA VACCINE  Completed                  CMS Preventative Services Quick Reference  Risk Factors Identified During Encounter  Immunizations Discussed/Encouraged: Prevnar 20 (Pneumococcal 20-valent conjugate) and COVID19  The above risks/problems have been discussed with the patient.  Pertinent information has been shared with the patient in the After Visit Summary.  An After Visit Summary and PPPS were made available to the patient.    Follow Up:   Next Medicare Wellness visit to be scheduled in 1 year.       Additional E&M Note during same encounter follows:  Patient has multiple medical problems which are significant and separately identifiable that require additional work above and beyond the Medicare Wellness Visit.      Chief Complaint  Medicare Wellness-subsequent (Medication refills)    Subjective        Pt is here for the medicare wellness exam--    Then also would like a referral to a new pulmonary specialist as she was told she was going to be placed on AIRSUPRA and was to be given samples--and she's called the office multiple times and could not reach them--and would like a second opinion as they are also mentioning something about possible COPD as ell-and also will need refills on the albuterol inhaler     Then also reports since having to change insurances--she will need refills on the RLS medication  "and tolerates ell and no SE no issues     Then also would like her fasting labs checked as well--since the Hx of prediabetes and Hx f folic acid def. And Vit D def.     Also due for F/U with her bariatric specialist the 10th this month --and admits to some wt gain since having gone on the cruise       Hattie Pringle is also being seen today for med refills and referral to  a ne pulmonary as she wants a second opinion     Review of Systems   Constitutional:  Positive for fatigue.   HENT:  Negative for trouble swallowing.    Eyes:  Negative for visual disturbance.   Respiratory:  Negative for choking and shortness of breath.    Cardiovascular:  Negative for chest pain.   Gastrointestinal:  Negative for abdominal pain and blood in stool.   Endocrine: Positive for polyphagia. Negative for polydipsia and polyuria.   Genitourinary:  Negative for dysuria and menstrual problem.   Musculoskeletal:  Positive for arthralgias.        Left ankle chronically    Neurological:  Negative for dizziness, seizures, syncope and light-headedness.   Hematological:  Does not bruise/bleed easily.   Psychiatric/Behavioral:  Negative for self-injury and suicidal ideas.        Objective   Vital Signs:  /70   Pulse 92   Temp 96.8 °F (36 °C) (Temporal)   Ht 160 cm (63\")   Wt 91.2 kg (201 lb)   SpO2 97%   BMI 35.61 kg/m²     Physical Exam  Vitals and nursing note reviewed.   Constitutional:       Appearance: Normal appearance.      Comments: Regained 11#    HENT:      Head: Normocephalic.      Right Ear: External ear normal.      Left Ear: External ear normal.      Nose: Nose normal.      Mouth/Throat:      Mouth: Mucous membranes are moist.   Eyes:      Pupils: Pupils are equal, round, and reactive to light.   Cardiovascular:      Rate and Rhythm: Normal rate and regular rhythm.      Heart sounds: Normal heart sounds.   Pulmonary:      Effort: Pulmonary effort is normal.      Breath sounds: Normal breath sounds.   Abdominal:      " Palpations: Abdomen is soft.      Tenderness: There is no abdominal tenderness.   Musculoskeletal:      Cervical back: Normal range of motion and neck supple.      Comments: Wearing a brace to the left ankle/foot    Skin:     General: Skin is warm and dry.   Neurological:      Mental Status: She is alert and oriented to person, place, and time.   Psychiatric:         Mood and Affect: Mood normal.         Behavior: Behavior normal.         Thought Content: Thought content normal.         Judgment: Judgment normal.                         Assessment and Plan   Diagnoses and all orders for this visit:    1. Medicare annual wellness visit, subsequent (Primary)  -     Vitamin D,25-Hydroxy  -     Vitamin B12 & Folate  -     Urinalysis With Culture If Indicated - Urine, Clean Catch  -     CBC & Differential  -     Comprehensive Metabolic Panel  -     Hemoglobin A1c  -     Lipid Panel  -     MicroAlbumin, Urine, Random - Urine, Clean Catch  -     TSH Rfx On Abnormal To Free T4    2. Restless legs syndrome  -     rOPINIRole (REQUIP) 0.5 MG tablet; Take 1 tablet by mouth Every Night. Take 1 hour before bedtime.  Dispense: 90 tablet; Refill: 1  -     CBC & Differential    3. Mild persistent asthma without complication  -     Ambulatory Referral to Pulmonology  -     albuterol sulfate  (90 Base) MCG/ACT inhaler; Inhale 2 puffs Every 6 (Six) Hours As Needed for Wheezing or Shortness of Air.  Dispense: 18 g; Refill: 2    4. Prediabetes  -     Vitamin D,25-Hydroxy  -     Vitamin B12 & Folate  -     Urinalysis With Culture If Indicated - Urine, Clean Catch  -     CBC & Differential  -     Comprehensive Metabolic Panel  -     Hemoglobin A1c  -     Lipid Panel  -     MicroAlbumin, Urine, Random - Urine, Clean Catch  -     TSH Rfx On Abnormal To Free T4    5. Screening, lipid  -     Lipid Panel    6. Fatigue, unspecified type  -     Vitamin D,25-Hydroxy  -     Vitamin B12 & Folate  -     Urinalysis With Culture If Indicated -  Urine, Clean Catch  -     CBC & Differential  -     Comprehensive Metabolic Panel  -     Lipid Panel  -     MicroAlbumin, Urine, Random - Urine, Clean Catch  -     TSH Rfx On Abnormal To Free T4    7. Mixed hyperlipidemia  -     Lipid Panel    8. Folic acid deficiency  -     Vitamin B12 & Folate    9. Vitamin D deficiency  -     Vitamin D,25-Hydroxy             Follow Up   Return in about 6 months (around 7/2/2024), or if symptoms worsen or fail to improve, for Recheck, fasting labs and refills.  Patient was given instructions and counseling regarding her condition or for health maintenance advice. Please see specific information pulled into the AVS if appropriate.

## 2024-01-03 DIAGNOSIS — E55.9 VITAMIN D DEFICIENCY: Primary | ICD-10-CM

## 2024-01-03 DIAGNOSIS — E53.8 FOLIC ACID DEFICIENCY: ICD-10-CM

## 2024-01-03 LAB — BACTERIA SPEC AEROBE CULT: NO GROWTH

## 2024-01-03 RX ORDER — ERGOCALCIFEROL 1.25 MG/1
50000 CAPSULE ORAL WEEKLY
Qty: 12 CAPSULE | Refills: 0 | Status: SHIPPED | OUTPATIENT
Start: 2024-01-03

## 2024-01-03 RX ORDER — FOLIC ACID 1 MG/1
1 TABLET ORAL DAILY
Qty: 90 TABLET | Refills: 3 | Status: SHIPPED | OUTPATIENT
Start: 2024-01-03

## 2024-01-03 NOTE — PROGRESS NOTES
Please mail letter to patient stating    Hattie blood counts were normal range and hemoglobin A1c was normal and the urine microalbumin was normal; and thyroid levels were normal and cholesterol panel was normal and comprehensive panel shows normal glucose and normal kidney and liver function test and normal electrolytes; and the vitamin B12 was in normal range but the folic acid was borderline low at 4.3 to and should be 4.78-24.20--and then the vitamin D was low again at 25.5 I will send in a another refill on the once weekly high-dose vitamin D2 50,000 IUs x 3 months and then after that I would recommend doing the low-dose once daily vitamin D3 2000 IUs there after--the urinalysis was abnormal and there is a urine culture pending and we will let you know if an antibiotic is indicated

## 2024-01-22 ENCOUNTER — HOSPITAL ENCOUNTER (OUTPATIENT)
Dept: MAMMOGRAPHY | Facility: HOSPITAL | Age: 49
Discharge: HOME | End: 2024-01-22
Admitting: NURSE PRACTITIONER
Payer: MEDICARE

## 2024-01-22 ENCOUNTER — OFFICE VISIT (OUTPATIENT)
Dept: FAMILY MEDICINE CLINIC | Facility: CLINIC | Age: 49
End: 2024-01-22
Payer: MEDICARE

## 2024-01-22 VITALS
HEART RATE: 84 BPM | TEMPERATURE: 97.3 F | HEIGHT: 63 IN | OXYGEN SATURATION: 98 % | BODY MASS INDEX: 35.97 KG/M2 | WEIGHT: 203 LBS

## 2024-01-22 DIAGNOSIS — Z12.31 VISIT FOR SCREENING MAMMOGRAM: ICD-10-CM

## 2024-01-22 DIAGNOSIS — R30.0 DYSURIA: Primary | ICD-10-CM

## 2024-01-22 LAB
BILIRUB BLD-MCNC: NEGATIVE MG/DL
CLARITY, POC: CLEAR
COLOR UR: YELLOW
EXPIRATION DATE: ABNORMAL
GLUCOSE UR STRIP-MCNC: NEGATIVE MG/DL
KETONES UR QL: NEGATIVE
LEUKOCYTE EST, POC: ABNORMAL
Lab: ABNORMAL
NITRITE UR-MCNC: POSITIVE MG/ML
PH UR: 6 [PH] (ref 5–8)
PROT UR STRIP-MCNC: NEGATIVE MG/DL
RBC # UR STRIP: ABNORMAL /UL
SP GR UR: 1.01 (ref 1–1.03)
UROBILINOGEN UR QL: ABNORMAL

## 2024-01-22 PROCEDURE — 87186 SC STD MICRODIL/AGAR DIL: CPT | Performed by: FAMILY MEDICINE

## 2024-01-22 PROCEDURE — 87077 CULTURE AEROBIC IDENTIFY: CPT | Performed by: FAMILY MEDICINE

## 2024-01-22 PROCEDURE — 77067 SCR MAMMO BI INCL CAD: CPT

## 2024-01-22 PROCEDURE — 81003 URINALYSIS AUTO W/O SCOPE: CPT | Performed by: FAMILY MEDICINE

## 2024-01-22 PROCEDURE — 87086 URINE CULTURE/COLONY COUNT: CPT | Performed by: FAMILY MEDICINE

## 2024-01-22 PROCEDURE — 99213 OFFICE O/P EST LOW 20 MIN: CPT | Performed by: FAMILY MEDICINE

## 2024-01-22 PROCEDURE — 3044F HG A1C LEVEL LT 7.0%: CPT | Performed by: FAMILY MEDICINE

## 2024-01-22 PROCEDURE — 77063 BREAST TOMOSYNTHESIS BI: CPT

## 2024-01-22 RX ORDER — NITROFURANTOIN 25; 75 MG/1; MG/1
100 CAPSULE ORAL 2 TIMES DAILY
Qty: 14 CAPSULE | Refills: 0 | Status: SHIPPED | OUTPATIENT
Start: 2024-01-22 | End: 2024-01-26

## 2024-01-22 RX ORDER — PHENAZOPYRIDINE HYDROCHLORIDE 200 MG/1
200 TABLET, FILM COATED ORAL 3 TIMES DAILY PRN
Qty: 6 TABLET | Refills: 0 | Status: SHIPPED | OUTPATIENT
Start: 2024-01-22 | End: 2024-01-26

## 2024-01-22 RX ORDER — ALBUTEROL SULFATE AND BUDESONIDE 90; 80 UG/1; UG/1
2 AEROSOL, METERED RESPIRATORY (INHALATION)
COMMUNITY
Start: 2024-01-04

## 2024-01-22 NOTE — PROGRESS NOTES
"Chief Complaint    Dysuria (Painful, frequent, burning urination since this morning.  Notes that she had diarrhea over the weekend.)    Subjective      Hattie Pringle presents to [facility]    History of Present Illness    1.) DYSURIA : Onset-over the course of the weekend.  Patient reports discomfort with urination.  Also reports burning with urination.  Diarrhea over the course of the weekend.  No flank pain.  No fevers or chills.  No gross hematuria.    Objective     Vital Signs:     Pulse 84   Temp 97.3 °F (36.3 °C) (Temporal)   Ht 160 cm (63\")   Wt 92.1 kg (203 lb)   SpO2 98%   BMI 35.96 kg/m²       Physical Exam  Vitals reviewed.   Constitutional:       General: She is not in acute distress.     Appearance: Normal appearance. She is well-developed.   HENT:      Head: Normocephalic and atraumatic.      Right Ear: Hearing and external ear normal.      Left Ear: Hearing and external ear normal.      Nose: Nose normal.   Eyes:      General: Lids are normal.         Right eye: No discharge.         Left eye: No discharge.      Conjunctiva/sclera: Conjunctivae normal.   Pulmonary:      Effort: Pulmonary effort is normal.   Abdominal:      General: There is no distension.   Musculoskeletal:         General: No swelling.      Cervical back: Neck supple.   Skin:     Coloration: Skin is not jaundiced.      Findings: No erythema.   Neurological:      Mental Status: She is alert. Mental status is at baseline.   Psychiatric:         Mood and Affect: Mood and affect normal.         Thought Content: Thought content normal.     Assessment and Plan     Diagnoses and all orders for this visit:    1. Dysuria (Primary)  Comments:  Reviewed.  Start antibiotics.  Urine sent for culture.  Pyridium as needed.  Additional recs per review of culture.  Orders:  -     POCT urinalysis dipstick, automated  -     Urine Culture - Urine, Urine, Clean Catch    Other orders  -     nitrofurantoin, " macrocrystal-monohydrate, (Macrobid) 100 MG capsule; Take 1 capsule by mouth 2 (Two) Times a Day for 7 days.  Dispense: 14 capsule; Refill: 0  -     phenazopyridine (Pyridium) 200 MG tablet; Take 1 tablet by mouth 3 (Three) Times a Day As Needed for Dysuria or Bladder Spasms.  Dispense: 6 tablet; Refill: 0    Follow Up     Return if symptoms worsen or fail to improve.    Patient was given instructions and counseling regarding her condition or for health maintenance advice. Please see specific information pulled into the AVS if appropriate.

## 2024-01-23 NOTE — PROGRESS NOTES
Please mail letter to patient stating    Hattie your mammogram was read as negative/benign and for you to continue doing your yearly mammographic screenings and I would like to continue doing your monthly self breast exams please

## 2024-01-25 LAB — BACTERIA SPEC AEROBE CULT: ABNORMAL

## 2024-01-26 ENCOUNTER — OFFICE VISIT (OUTPATIENT)
Dept: FAMILY MEDICINE CLINIC | Facility: CLINIC | Age: 49
End: 2024-01-26
Payer: MEDICARE

## 2024-01-26 VITALS
WEIGHT: 205 LBS | HEIGHT: 66 IN | SYSTOLIC BLOOD PRESSURE: 142 MMHG | TEMPERATURE: 98 F | BODY MASS INDEX: 32.95 KG/M2 | HEART RATE: 70 BPM | DIASTOLIC BLOOD PRESSURE: 78 MMHG | OXYGEN SATURATION: 98 %

## 2024-01-26 DIAGNOSIS — R05.1 ACUTE COUGH: Primary | ICD-10-CM

## 2024-01-26 DIAGNOSIS — J06.9 ACUTE URI: ICD-10-CM

## 2024-01-26 LAB
EXPIRATION DATE: NORMAL
EXPIRATION DATE: NORMAL
FLUAV AG UPPER RESP QL IA.RAPID: NOT DETECTED
FLUBV AG UPPER RESP QL IA.RAPID: NOT DETECTED
INTERNAL CONTROL: NORMAL
INTERNAL CONTROL: NORMAL
Lab: NORMAL
Lab: NORMAL
S PYO AG THROAT QL: NEGATIVE
SARS-COV-2 AG UPPER RESP QL IA.RAPID: NOT DETECTED

## 2024-01-26 PROCEDURE — 99213 OFFICE O/P EST LOW 20 MIN: CPT | Performed by: FAMILY MEDICINE

## 2024-01-26 PROCEDURE — 1159F MED LIST DOCD IN RCRD: CPT | Performed by: FAMILY MEDICINE

## 2024-01-26 PROCEDURE — 1160F RVW MEDS BY RX/DR IN RCRD: CPT | Performed by: FAMILY MEDICINE

## 2024-01-26 PROCEDURE — 87880 STREP A ASSAY W/OPTIC: CPT | Performed by: FAMILY MEDICINE

## 2024-01-26 PROCEDURE — 87428 SARSCOV & INF VIR A&B AG IA: CPT | Performed by: FAMILY MEDICINE

## 2024-01-26 PROCEDURE — 3044F HG A1C LEVEL LT 7.0%: CPT | Performed by: FAMILY MEDICINE

## 2024-01-26 NOTE — PROGRESS NOTES
"Chief Complaint  Headache (Exposed to Flu and Strep. )    Subjective      Headache    Hattie Pringle is a 48 y.o. female who presents to [facility] with a past medical history of  Past Medical History:   Diagnosis Date    Acid reflux disease     Anemia     Asthma     Chronic GERD     Degenerative arthritis of ankle     Diverticulitis     Emotional depression     Hiatal hernia     Limited joint range of motion     Menorrhagia     MRSA (methicillin resistant Staphylococcus aureus)     Nerve damage     Pneumonia     15 years ago    PONV (postoperative nausea and vomiting)     Restless leg syndrome     UTI (urinary tract infection)      Headache.  She has been exposed to flu and strep and would like tested. Had symptoms for about a day and a half. Coughing a little bit, coughing up mucus. No fever, chills, or body aches. No sore throat.    Objective   Vital Signs:   Vitals:    01/26/24 1508   BP: 142/78   Pulse: 70   Temp: 98 °F (36.7 °C)   SpO2: 98%   Weight: 93 kg (205 lb)   Height: 166.4 cm (65.5\")     Body mass index is 33.59 kg/m².    Wt Readings from Last 3 Encounters:   01/26/24 93 kg (205 lb)   01/22/24 92.1 kg (203 lb)   01/02/24 91.2 kg (201 lb)     BP Readings from Last 3 Encounters:   01/26/24 142/78   01/02/24 120/70   10/06/23 116/71       Health Maintenance   Topic Date Due    COVID-19 Vaccine (7 - 2023-24 season) 09/01/2023    HEMOGLOBIN A1C  07/02/2024    BMI FOLLOWUP  08/09/2024    ANNUAL WELLNESS VISIT  01/02/2025    LIPID PANEL  01/02/2025    URINE MICROALBUMIN  01/02/2025    TDAP/TD VACCINES (2 - Td or Tdap) 02/23/2032    COLORECTAL CANCER SCREENING  03/23/2033    HEPATITIS C SCREENING  Completed    Pneumococcal Vaccine 0-64  Completed    INFLUENZA VACCINE  Completed    DIABETIC FOOT EXAM  Discontinued    DIABETIC EYE EXAM  Discontinued       Physical Exam  Vitals and nursing note reviewed.   Constitutional:       General: She is not in acute distress.     " Appearance: Normal appearance.   HENT:      Head: Normocephalic and atraumatic.      Nose: No rhinorrhea.      Mouth/Throat:      Pharynx: No oropharyngeal exudate or posterior oropharyngeal erythema.   Cardiovascular:      Rate and Rhythm: Normal rate and regular rhythm.      Heart sounds: No murmur heard.  Pulmonary:      Effort: Pulmonary effort is normal. No respiratory distress.      Breath sounds: Normal breath sounds. No wheezing.   Skin:     General: Skin is warm and dry.   Neurological:      Mental Status: She is alert.   Psychiatric:         Mood and Affect: Mood normal.         Behavior: Behavior normal.            Result Review :  The following data was reviewed by: Nirmal Smalls MD on 01/26/2024:  Lab Results   Lab 01/26/24  1534   SARS ANTIGEN Not Detected      Lab Results   Lab 01/26/24  1534   INFLUENZA A ANTIGEN GURU Not Detected                Lab Results   Lab 01/26/24  1534   INFLUENZA B ANTIGEN GURU Not Detected      Lab Results   Lab 01/26/24  1527   RAPID STREP A SCREEN Negative                                      Procedures        Assessment and Plan   Diagnoses and all orders for this visit:    1. Acute cough (Primary)  -     POCT rapid strep A  -     POCT SARS-CoV-2 Antigen GURU + Flu    2. Acute URI      Swabs negative in office. Advised supportive measures such as hydration and rest. Can do OTC supportive medications such as an antihistamine or decongestant if needed. Honey can be soothing to a sore throat. Return if new or worsening symptoms.    Patient has Tessalon Perles at home and will use those for cough relief.            FOLLOW UP  Return if symptoms worsen or fail to improve.  Patient was given instructions and counseling regarding her condition or for health maintenance advice. Please see specific information pulled into the AVS if appropriate.       Nirmal Smalls MD  01/26/24  15:38 EST    CURRENT & DISCONTINUED MEDICATIONS  Current Outpatient Medications   Medication  Instructions    albuterol sulfate  (90 Base) MCG/ACT inhaler 2 puffs, Inhalation, Every 6 Hours PRN    Albuterol-Budesonide (Airsupra) 90-80 MCG/ACT aerosol 2 puffs, Inhalation    folic acid (FOLVITE) 1 mg, Oral, Daily    Multiple Vitamins-Iron (MULTIVITAMIN/IRON PO) 1 tablet, Oral, Daily    rOPINIRole (REQUIP) 0.5 mg, Oral, Nightly, Take 1 hour before bedtime.     vitamin B-12 (CYANOCOBALAMIN) 2,500 mcg, Oral, Daily, taking 5 tabs daily     vitamin D (ERGOCALCIFEROL) 50,000 Units, Oral, Weekly       Medications Discontinued During This Encounter   Medication Reason    nitrofurantoin, macrocrystal-monohydrate, (Macrobid) 100 MG capsule *Therapy completed    phenazopyridine (Pyridium) 200 MG tablet *Therapy completed

## 2024-03-18 ENCOUNTER — OFFICE VISIT (OUTPATIENT)
Dept: PULMONOLOGY | Facility: CLINIC | Age: 49
End: 2024-03-18
Payer: MEDICARE

## 2024-03-18 VITALS
BODY MASS INDEX: 32.75 KG/M2 | DIASTOLIC BLOOD PRESSURE: 76 MMHG | SYSTOLIC BLOOD PRESSURE: 109 MMHG | RESPIRATION RATE: 16 BRPM | TEMPERATURE: 98.7 F | OXYGEN SATURATION: 98 % | HEIGHT: 66 IN | WEIGHT: 203.8 LBS | HEART RATE: 77 BPM

## 2024-03-18 DIAGNOSIS — R91.8 LUNG NODULES: ICD-10-CM

## 2024-03-18 DIAGNOSIS — E66.01 MORBID (SEVERE) OBESITY DUE TO EXCESS CALORIES: ICD-10-CM

## 2024-03-18 DIAGNOSIS — J45.30 MILD PERSISTENT ASTHMA WITHOUT COMPLICATION: ICD-10-CM

## 2024-03-18 DIAGNOSIS — J45.20 MILD INTERMITTENT ASTHMA, UNSPECIFIED WHETHER COMPLICATED: Primary | ICD-10-CM

## 2024-03-18 PROCEDURE — 1159F MED LIST DOCD IN RCRD: CPT | Performed by: INTERNAL MEDICINE

## 2024-03-18 PROCEDURE — 99203 OFFICE O/P NEW LOW 30 MIN: CPT | Performed by: INTERNAL MEDICINE

## 2024-03-18 PROCEDURE — 1160F RVW MEDS BY RX/DR IN RCRD: CPT | Performed by: INTERNAL MEDICINE

## 2024-03-18 RX ORDER — ALBUTEROL SULFATE 90 UG/1
2 AEROSOL, METERED RESPIRATORY (INHALATION) EVERY 6 HOURS PRN
Qty: 18 G | Refills: 2 | Status: SHIPPED | OUTPATIENT
Start: 2024-03-18
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
## 2024-03-25 ENCOUNTER — PATIENT ROUNDING (BHMG ONLY) (OUTPATIENT)
Dept: PULMONOLOGY | Facility: CLINIC | Age: 49
End: 2024-03-25
Payer: MEDICARE

## 2024-03-25 NOTE — PROGRESS NOTES
March 25, 2024    Hello, may I speak with Hattie Pringle?    My name is Uma      I am  with [facility]  [address]  [address]  [facility] [place] [address] [phone].    Before we get started may I verify your date of birth? 1975    I am calling to officially welcome you to our practice and ask about your recent visit. Is this a good time to talk? My chart message sent for patient rounding.

## 2024-04-01 ENCOUNTER — HOSPITAL ENCOUNTER (OUTPATIENT)
Dept: RESPIRATORY THERAPY | Facility: HOSPITAL | Age: 49
Discharge: HOME | End: 2024-04-01
Admitting: INTERNAL MEDICINE
Payer: MEDICARE

## 2024-04-01 DIAGNOSIS — E66.01 MORBID (SEVERE) OBESITY DUE TO EXCESS CALORIES: ICD-10-CM

## 2024-04-01 DIAGNOSIS — J45.20 MILD INTERMITTENT ASTHMA, UNSPECIFIED WHETHER COMPLICATED: ICD-10-CM

## 2024-04-01 PROCEDURE — 94726 PLETHYSMOGRAPHY LUNG VOLUMES: CPT

## 2024-04-01 PROCEDURE — 94060 EVALUATION OF WHEEZING: CPT

## 2024-04-01 PROCEDURE — 94729 DIFFUSING CAPACITY: CPT

## 2024-04-01 RX ORDER — ALBUTEROL SULFATE 2.5 MG/3ML
2.5 SOLUTION RESPIRATORY (INHALATION) ONCE
Status: COMPLETED | OUTPATIENT
Start: 2024-04-01 | End: 2024-04-01

## 2024-04-01 RX ADMIN — ALBUTEROL SULFATE 2.5 MG: 2.5 SOLUTION RESPIRATORY (INHALATION) at 09:15

## 2024-04-03 ENCOUNTER — TELEPHONE (OUTPATIENT)
Dept: PULMONOLOGY | Facility: CLINIC | Age: 49
End: 2024-04-03

## 2024-04-03 NOTE — TELEPHONE ENCOUNTER
Caller: Hattie Pringle      Relationship: SELF     Best call back number: [phone]      Who are you requesting to speak with (clinical staff, provider,  specific staff member): JEFFREY     What was the call regarding: REQ A CALL BACK REGARDING RESULTS

## 2024-04-04 NOTE — TELEPHONE ENCOUNTER
Called and spoke with patient informed her of results. Advised to continue current inhalers and to keep follow-up as scheduled

## 2024-04-06 ENCOUNTER — APPOINTMENT (OUTPATIENT)
Dept: CT IMAGING | Facility: HOSPITAL | Age: 49
End: 2024-04-06
Payer: MEDICARE

## 2024-04-06 ENCOUNTER — HOSPITAL ENCOUNTER (EMERGENCY)
Facility: HOSPITAL | Age: 49
Discharge: HOME | End: 2024-04-06
Attending: EMERGENCY MEDICINE
Payer: MEDICARE

## 2024-04-06 VITALS
WEIGHT: 204.81 LBS | DIASTOLIC BLOOD PRESSURE: 61 MMHG | HEART RATE: 109 BPM | RESPIRATION RATE: 18 BRPM | BODY MASS INDEX: 34.97 KG/M2 | TEMPERATURE: 98 F | SYSTOLIC BLOOD PRESSURE: 149 MMHG | OXYGEN SATURATION: 97 % | HEIGHT: 64 IN

## 2024-04-06 DIAGNOSIS — N39.0 URINARY TRACT INFECTION WITH HEMATURIA, SITE UNSPECIFIED: Primary | ICD-10-CM

## 2024-04-06 DIAGNOSIS — M54.42 ACUTE LEFT-SIDED LOW BACK PAIN WITH LEFT-SIDED SCIATICA: ICD-10-CM

## 2024-04-06 DIAGNOSIS — R31.9 URINARY TRACT INFECTION WITH HEMATURIA, SITE UNSPECIFIED: Primary | ICD-10-CM

## 2024-04-06 DIAGNOSIS — R10.9 LEFT SIDED ABDOMINAL PAIN OF UNKNOWN CAUSE: ICD-10-CM

## 2024-04-06 LAB
ALBUMIN SERPL-MCNC: 4.1 G/DL (ref 3.5–5.2)
ALBUMIN/GLOB SERPL: 1.6 G/DL
ALP SERPL-CCNC: 85 U/L (ref 39–117)
ALT SERPL W P-5'-P-CCNC: 40 U/L (ref 1–33)
ANION GAP SERPL CALCULATED.3IONS-SCNC: 12.1 MMOL/L (ref 5–15)
AST SERPL-CCNC: 17 U/L (ref 1–32)
BACTERIA UR QL AUTO: ABNORMAL /HPF
BASOPHILS # BLD AUTO: 0.05 10*3/MM3 (ref 0–0.2)
BASOPHILS NFR BLD AUTO: 0.6 % (ref 0–1.5)
BILIRUB SERPL-MCNC: 0.2 MG/DL (ref 0–1.2)
BILIRUB UR QL STRIP: NEGATIVE
BUN SERPL-MCNC: 17 MG/DL (ref 6–20)
BUN/CREAT SERPL: 23.6 (ref 7–25)
CALCIUM SPEC-SCNC: 9.1 MG/DL (ref 8.6–10.5)
CHLORIDE SERPL-SCNC: 103 MMOL/L (ref 98–107)
CLARITY UR: ABNORMAL
CO2 SERPL-SCNC: 24.9 MMOL/L (ref 22–29)
COLOR UR: YELLOW
CREAT SERPL-MCNC: 0.72 MG/DL (ref 0.57–1)
DEPRECATED RDW RBC AUTO: 40.9 FL (ref 37–54)
EGFRCR SERPLBLD CKD-EPI 2021: 103.9 ML/MIN/1.73
EOSINOPHIL # BLD AUTO: 0.25 10*3/MM3 (ref 0–0.4)
EOSINOPHIL NFR BLD AUTO: 3.2 % (ref 0.3–6.2)
ERYTHROCYTE [DISTWIDTH] IN BLOOD BY AUTOMATED COUNT: 12.3 % (ref 12.3–15.4)
GLOBULIN UR ELPH-MCNC: 2.6 GM/DL
GLUCOSE SERPL-MCNC: 105 MG/DL (ref 65–99)
GLUCOSE UR STRIP-MCNC: NEGATIVE MG/DL
HCG INTACT+B SERPL-ACNC: <0.5 MIU/ML
HCT VFR BLD AUTO: 42.4 % (ref 34–46.6)
HGB BLD-MCNC: 14.1 G/DL (ref 12–15.9)
HGB UR QL STRIP.AUTO: ABNORMAL
HOLD SPECIMEN: NORMAL
HOLD SPECIMEN: NORMAL
HYALINE CASTS UR QL AUTO: ABNORMAL /LPF
IMM GRANULOCYTES # BLD AUTO: 0.03 10*3/MM3 (ref 0–0.05)
IMM GRANULOCYTES NFR BLD AUTO: 0.4 % (ref 0–0.5)
KETONES UR QL STRIP: ABNORMAL
LEUKOCYTE ESTERASE UR QL STRIP.AUTO: ABNORMAL
LIPASE SERPL-CCNC: 34 U/L (ref 13–60)
LYMPHOCYTES # BLD AUTO: 2.18 10*3/MM3 (ref 0.7–3.1)
LYMPHOCYTES NFR BLD AUTO: 27.7 % (ref 19.6–45.3)
MCH RBC QN AUTO: 29.9 PG (ref 26.6–33)
MCHC RBC AUTO-ENTMCNC: 33.3 G/DL (ref 31.5–35.7)
MCV RBC AUTO: 90 FL (ref 79–97)
MONOCYTES # BLD AUTO: 0.51 10*3/MM3 (ref 0.1–0.9)
MONOCYTES NFR BLD AUTO: 6.5 % (ref 5–12)
NEUTROPHILS NFR BLD AUTO: 4.85 10*3/MM3 (ref 1.7–7)
NEUTROPHILS NFR BLD AUTO: 61.6 % (ref 42.7–76)
NITRITE UR QL STRIP: NEGATIVE
NRBC BLD AUTO-RTO: 0 /100 WBC (ref 0–0.2)
PH UR STRIP.AUTO: 6 [PH] (ref 5–8)
PLATELET # BLD AUTO: 330 10*3/MM3 (ref 140–450)
PMV BLD AUTO: 8 FL (ref 6–12)
POTASSIUM SERPL-SCNC: 4.4 MMOL/L (ref 3.5–5.2)
PROT SERPL-MCNC: 6.7 G/DL (ref 6–8.5)
PROT UR QL STRIP: NEGATIVE
RBC # BLD AUTO: 4.71 10*6/MM3 (ref 3.77–5.28)
RBC # UR STRIP: ABNORMAL /HPF
REF LAB TEST METHOD: ABNORMAL
SODIUM SERPL-SCNC: 140 MMOL/L (ref 136–145)
SP GR UR STRIP: 1.03 (ref 1–1.03)
SQUAMOUS #/AREA URNS HPF: ABNORMAL /HPF
UROBILINOGEN UR QL STRIP: ABNORMAL
WBC # UR STRIP: ABNORMAL /HPF
WBC NRBC COR # BLD AUTO: 7.87 10*3/MM3 (ref 3.4–10.8)
WHOLE BLOOD HOLD COAG: NORMAL
WHOLE BLOOD HOLD SPECIMEN: NORMAL

## 2024-04-06 PROCEDURE — 84702 CHORIONIC GONADOTROPIN TEST: CPT | Performed by: NURSE PRACTITIONER

## 2024-04-06 PROCEDURE — 80053 COMPREHEN METABOLIC PANEL: CPT | Performed by: NURSE PRACTITIONER

## 2024-04-06 PROCEDURE — 25510000001 IOPAMIDOL PER 1 ML: Performed by: EMERGENCY MEDICINE

## 2024-04-06 PROCEDURE — 85025 COMPLETE CBC W/AUTO DIFF WBC: CPT | Performed by: NURSE PRACTITIONER

## 2024-04-06 PROCEDURE — 25010000002 CEFTRIAXONE PER 250 MG: Performed by: NURSE PRACTITIONER

## 2024-04-06 PROCEDURE — 81001 URINALYSIS AUTO W/SCOPE: CPT | Performed by: NURSE PRACTITIONER

## 2024-04-06 PROCEDURE — 96375 TX/PRO/DX INJ NEW DRUG ADDON: CPT

## 2024-04-06 PROCEDURE — 87086 URINE CULTURE/COLONY COUNT: CPT | Performed by: NURSE PRACTITIONER

## 2024-04-06 PROCEDURE — 99285 EMERGENCY DEPT VISIT HI MDM: CPT

## 2024-04-06 PROCEDURE — 25010000002 KETOROLAC TROMETHAMINE PER 15 MG: Performed by: NURSE PRACTITIONER

## 2024-04-06 PROCEDURE — 74177 CT ABD & PELVIS W/CONTRAST: CPT

## 2024-04-06 PROCEDURE — 36415 COLL VENOUS BLD VENIPUNCTURE: CPT

## 2024-04-06 PROCEDURE — 83690 ASSAY OF LIPASE: CPT | Performed by: NURSE PRACTITIONER

## 2024-04-06 PROCEDURE — 96365 THER/PROPH/DIAG IV INF INIT: CPT

## 2024-04-06 RX ORDER — KETOROLAC TROMETHAMINE 10 MG/1
10 TABLET, FILM COATED ORAL EVERY 6 HOURS PRN
Qty: 20 TABLET | Refills: 0 | Status: SHIPPED | OUTPATIENT
Start: 2024-04-06

## 2024-04-06 RX ORDER — SODIUM CHLORIDE 0.9 % (FLUSH) 0.9 %
10 SYRINGE (ML) INJECTION AS NEEDED
Status: DISCONTINUED | OUTPATIENT
Start: 2024-04-06 | End: 2024-04-06 | Stop reason: HOSPADM

## 2024-04-06 RX ORDER — CEPHALEXIN 500 MG/1
500 CAPSULE ORAL 4 TIMES DAILY
Qty: 28 CAPSULE | Refills: 0 | Status: SHIPPED | OUTPATIENT
Start: 2024-04-06 | End: 2024-04-13

## 2024-04-06 RX ORDER — CYCLOBENZAPRINE HCL 10 MG
10 TABLET ORAL 3 TIMES DAILY PRN
Qty: 15 TABLET | Refills: 0 | Status: SHIPPED | OUTPATIENT
Start: 2024-04-06

## 2024-04-06 RX ORDER — KETOROLAC TROMETHAMINE 30 MG/ML
30 INJECTION, SOLUTION INTRAMUSCULAR; INTRAVENOUS ONCE
Status: COMPLETED | OUTPATIENT
Start: 2024-04-06 | End: 2024-04-06

## 2024-04-06 RX ADMIN — KETOROLAC TROMETHAMINE 30 MG: 30 INJECTION, SOLUTION INTRAMUSCULAR; INTRAVENOUS at 04:23

## 2024-04-06 RX ADMIN — IOPAMIDOL 100 ML: 755 INJECTION, SOLUTION INTRAVENOUS at 04:11

## 2024-04-06 RX ADMIN — CEFTRIAXONE SODIUM 2000 MG: 2 INJECTION, POWDER, FOR SOLUTION INTRAMUSCULAR; INTRAVENOUS at 04:23
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
## 2024-04-06 NOTE — DISCHARGE INSTRUCTIONS
CT scan was unremarkable and did not show any acute abnormality.    As we discussed your urine had infection for which you already got antibiotic.    We will also treat and give you medications to help with back pain.    Rest.  Alternate ice or moist heat to low back for comfort

## 2024-04-06 NOTE — ED TRIAGE NOTES
Left lower back pain shooting down into leg since yesterday.  Spotting blood x 2 days with intermittent lower abd pain and nausea in mornings. Denies dysuria or frequency.

## 2024-04-06 NOTE — Clinical Note
[facility] EMERGENCY ROOM  [address] [place] [address]  Phone: [phone]  Fax: [phone]    Hattie Pringle was seen and treated in our emergency department on 4/6/2024.  She may return to work on 04/08/2024.         Thank you for choosing [facility].    Yu Salazar APRN

## 2024-04-07 LAB — BACTERIA SPEC AEROBE CULT: NORMAL

## 2024-04-16 ENCOUNTER — OFFICE VISIT (OUTPATIENT)
Dept: FAMILY MEDICINE CLINIC | Facility: CLINIC | Age: 49
End: 2024-04-16
Payer: MEDICARE

## 2024-04-16 VITALS
TEMPERATURE: 97.5 F | HEART RATE: 112 BPM | OXYGEN SATURATION: 98 % | BODY MASS INDEX: 35.51 KG/M2 | WEIGHT: 208 LBS | DIASTOLIC BLOOD PRESSURE: 72 MMHG | SYSTOLIC BLOOD PRESSURE: 122 MMHG | HEIGHT: 64 IN

## 2024-04-16 DIAGNOSIS — M51.37 DDD (DEGENERATIVE DISC DISEASE), LUMBOSACRAL: ICD-10-CM

## 2024-04-16 DIAGNOSIS — M53.3 SACRO ILIAL PAIN: ICD-10-CM

## 2024-04-16 DIAGNOSIS — R10.2 LEFT ADNEXAL TENDERNESS: ICD-10-CM

## 2024-04-16 DIAGNOSIS — Z09 ENCOUNTER FOR EXAMINATION FOLLOWING TREATMENT AT HOSPITAL: Primary | ICD-10-CM

## 2024-04-16 DIAGNOSIS — R74.01 ELEVATED ALT MEASUREMENT: ICD-10-CM

## 2024-04-16 DIAGNOSIS — N83.202 CYST OF LEFT OVARY: ICD-10-CM

## 2024-04-16 LAB
ALBUMIN SERPL-MCNC: 3.9 G/DL (ref 3.5–5.2)
ALP SERPL-CCNC: 102 U/L (ref 39–117)
ALT SERPL W P-5'-P-CCNC: 76 U/L (ref 1–33)
AST SERPL-CCNC: 21 U/L (ref 1–32)
BILIRUB CONJ SERPL-MCNC: <0.2 MG/DL (ref 0–0.3)
BILIRUB INDIRECT SERPL-MCNC: ABNORMAL MG/DL
BILIRUB SERPL-MCNC: <0.2 MG/DL (ref 0–1.2)
PROT SERPL-MCNC: 6.3 G/DL (ref 6–8.5)

## 2024-04-16 PROCEDURE — 3044F HG A1C LEVEL LT 7.0%: CPT | Performed by: NURSE PRACTITIONER

## 2024-04-16 PROCEDURE — 1159F MED LIST DOCD IN RCRD: CPT | Performed by: NURSE PRACTITIONER

## 2024-04-16 PROCEDURE — 1160F RVW MEDS BY RX/DR IN RCRD: CPT | Performed by: NURSE PRACTITIONER

## 2024-04-16 PROCEDURE — 80076 HEPATIC FUNCTION PANEL: CPT | Performed by: NURSE PRACTITIONER

## 2024-04-16 PROCEDURE — 99214 OFFICE O/P EST MOD 30 MIN: CPT | Performed by: NURSE PRACTITIONER

## 2024-04-16 RX ORDER — DICLOFENAC SODIUM 75 MG/1
TABLET, DELAYED RELEASE ORAL
COMMUNITY
Start: 2024-04-14
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
## 2024-04-16 NOTE — PROGRESS NOTES
..  Venipuncture Blood Specimen Collection  Venipuncture performed in LT arm by Connie Montes De Oca with good hemostasis. Patient tolerated the procedure well without complications.   04/16/24   Gladis Johnson MA

## 2024-04-17 DIAGNOSIS — R74.01 ELEVATED ALT MEASUREMENT: Primary | ICD-10-CM

## 2024-04-17 NOTE — PROGRESS NOTES
Please mail letter to patient stating    Hattie the hepatic function panel shows everything normal with the exception of the persistently elevated ALT and it is actually higher than it was last time 11 days ago it was at 40 now at 76 and there are things that can cause the elevations in the liver function tests such as: Certain viruses certain infections certain types of medications alcohol use fatty liver disease etc. so I will drop in lab orders for you to stop by nonfasting to follow-up with this workup

## 2024-04-23 ENCOUNTER — HOSPITAL ENCOUNTER (OUTPATIENT)
Dept: ULTRASOUND IMAGING | Facility: HOSPITAL | Age: 49
Discharge: HOME | End: 2024-04-23
Admitting: NURSE PRACTITIONER
Payer: MEDICARE

## 2024-04-23 DIAGNOSIS — R10.2 LEFT ADNEXAL TENDERNESS: ICD-10-CM

## 2024-04-23 DIAGNOSIS — N83.202 CYST OF LEFT OVARY: ICD-10-CM

## 2024-04-23 PROCEDURE — 76830 TRANSVAGINAL US NON-OB: CPT

## 2024-04-24 ENCOUNTER — CLINICAL SUPPORT (OUTPATIENT)
Dept: FAMILY MEDICINE CLINIC | Facility: CLINIC | Age: 49
End: 2024-04-24
Payer: MEDICARE

## 2024-04-24 DIAGNOSIS — K57.90 DIVERTICULOSIS: Primary | ICD-10-CM

## 2024-04-24 LAB
ALBUMIN SERPL-MCNC: 4.2 G/DL (ref 3.5–5.2)
ALBUMIN/GLOB SERPL: 1.8 G/DL
ALP SERPL-CCNC: 76 U/L (ref 39–117)
ALT SERPL W P-5'-P-CCNC: 13 U/L (ref 1–33)
ANION GAP SERPL CALCULATED.3IONS-SCNC: 8.8 MMOL/L (ref 5–15)
AST SERPL-CCNC: 13 U/L (ref 1–32)
BASOPHILS # BLD AUTO: 0.05 10*3/MM3 (ref 0–0.2)
BASOPHILS NFR BLD AUTO: 0.7 % (ref 0–1.5)
BILIRUB SERPL-MCNC: 0.6 MG/DL (ref 0–1.2)
BUN SERPL-MCNC: 14 MG/DL (ref 6–20)
BUN/CREAT SERPL: 19.7 (ref 7–25)
CALCIUM SPEC-SCNC: 9.1 MG/DL (ref 8.6–10.5)
CERULOPLASMIN SERPL-MCNC: 24 MG/DL (ref 19–39)
CHLORIDE SERPL-SCNC: 104 MMOL/L (ref 98–107)
CO2 SERPL-SCNC: 25.2 MMOL/L (ref 22–29)
CREAT SERPL-MCNC: 0.71 MG/DL (ref 0.57–1)
DEPRECATED RDW RBC AUTO: 40.9 FL (ref 37–54)
EGFRCR SERPLBLD CKD-EPI 2021: 105.7 ML/MIN/1.73
EOSINOPHIL # BLD AUTO: 0.21 10*3/MM3 (ref 0–0.4)
EOSINOPHIL NFR BLD AUTO: 2.9 % (ref 0.3–6.2)
ERYTHROCYTE [DISTWIDTH] IN BLOOD BY AUTOMATED COUNT: 12.3 % (ref 12.3–15.4)
FERRITIN SERPL-MCNC: 149 NG/ML (ref 13–150)
GLOBULIN UR ELPH-MCNC: 2.4 GM/DL
GLUCOSE SERPL-MCNC: 86 MG/DL (ref 65–99)
HAV IGM SERPL QL IA: NORMAL
HBV CORE IGM SERPL QL IA: NORMAL
HBV SURFACE AG SERPL QL IA: NORMAL
HCT VFR BLD AUTO: 41.9 % (ref 34–46.6)
HCV AB SER QL: NORMAL
HGB BLD-MCNC: 13.7 G/DL (ref 12–15.9)
IMM GRANULOCYTES # BLD AUTO: 0.03 10*3/MM3 (ref 0–0.05)
IMM GRANULOCYTES NFR BLD AUTO: 0.4 % (ref 0–0.5)
IRON 24H UR-MRATE: 172 MCG/DL (ref 37–145)
IRON SATN MFR SERPL: 47 % (ref 20–50)
LYMPHOCYTES # BLD AUTO: 1.77 10*3/MM3 (ref 0.7–3.1)
LYMPHOCYTES NFR BLD AUTO: 24.4 % (ref 19.6–45.3)
MCH RBC QN AUTO: 29.9 PG (ref 26.6–33)
MCHC RBC AUTO-ENTMCNC: 32.7 G/DL (ref 31.5–35.7)
MCV RBC AUTO: 91.5 FL (ref 79–97)
MONOCYTES # BLD AUTO: 0.43 10*3/MM3 (ref 0.1–0.9)
MONOCYTES NFR BLD AUTO: 5.9 % (ref 5–12)
NEUTROPHILS NFR BLD AUTO: 4.76 10*3/MM3 (ref 1.7–7)
NEUTROPHILS NFR BLD AUTO: 65.7 % (ref 42.7–76)
NRBC BLD AUTO-RTO: 0 /100 WBC (ref 0–0.2)
PLATELET # BLD AUTO: 345 10*3/MM3 (ref 140–450)
PMV BLD AUTO: 8.4 FL (ref 6–12)
POTASSIUM SERPL-SCNC: 4.1 MMOL/L (ref 3.5–5.2)
PROT SERPL-MCNC: 6.6 G/DL (ref 6–8.5)
RBC # BLD AUTO: 4.58 10*6/MM3 (ref 3.77–5.28)
SODIUM SERPL-SCNC: 138 MMOL/L (ref 136–145)
TIBC SERPL-MCNC: 367 MCG/DL (ref 298–536)
TRANSFERRIN SERPL-MCNC: 246 MG/DL (ref 200–360)
WBC NRBC COR # BLD AUTO: 7.25 10*3/MM3 (ref 3.4–10.8)

## 2024-04-24 PROCEDURE — 82104 ALPHA-1-ANTITRYPSIN PHENO: CPT | Performed by: NURSE PRACTITIONER

## 2024-04-24 PROCEDURE — 86038 ANTINUCLEAR ANTIBODIES: CPT | Performed by: NURSE PRACTITIONER

## 2024-04-24 PROCEDURE — 82390 ASSAY OF CERULOPLASMIN: CPT | Performed by: NURSE PRACTITIONER

## 2024-04-24 PROCEDURE — 80074 ACUTE HEPATITIS PANEL: CPT | Performed by: NURSE PRACTITIONER

## 2024-04-24 PROCEDURE — 82728 ASSAY OF FERRITIN: CPT | Performed by: NURSE PRACTITIONER

## 2024-04-24 PROCEDURE — 86015 ACTIN ANTIBODY EACH: CPT | Performed by: NURSE PRACTITIONER

## 2024-04-24 PROCEDURE — 80053 COMPREHEN METABOLIC PANEL: CPT | Performed by: NURSE PRACTITIONER

## 2024-04-24 PROCEDURE — 86381 MITOCHONDRIAL ANTIBODY EACH: CPT | Performed by: NURSE PRACTITIONER

## 2024-04-24 PROCEDURE — 83540 ASSAY OF IRON: CPT | Performed by: NURSE PRACTITIONER

## 2024-04-24 PROCEDURE — 82103 ALPHA-1-ANTITRYPSIN TOTAL: CPT | Performed by: NURSE PRACTITIONER

## 2024-04-24 PROCEDURE — 85025 COMPLETE CBC W/AUTO DIFF WBC: CPT | Performed by: NURSE PRACTITIONER

## 2024-04-24 PROCEDURE — 36415 COLL VENOUS BLD VENIPUNCTURE: CPT | Performed by: NURSE PRACTITIONER

## 2024-04-24 PROCEDURE — 84466 ASSAY OF TRANSFERRIN: CPT | Performed by: NURSE PRACTITIONER

## 2024-04-24 NOTE — PROGRESS NOTES
Venipuncture Blood Specimen Collection  Venipuncture performed in left arm by Connie Montes De Oca with good hemostasis. Patient tolerated the procedure well without complications.   04/24/24   Eneida Watts

## 2024-04-24 NOTE — PROGRESS NOTES
Please mail letter to pt stating    Hattie--the transvaginal ultrasound shows normal appearing dominant follicles in the ovaries (meaning they are still functioning) and no masses and no complex cysts and normal color doppler flow--if any symptoms persist--we could refer to a gyn  Patient is a 54y old  Male who presents with a chief complaint of CAP superimposed by possible COVID-19 (18 Mar 2020 03:25)      INTERVAL HPI/OVERNIGHT EVENTS:   patient was negative for COVID-19 virus.  He was c/o chest pain last night. EKG was nsr  Patient is on 2 l of NC    MEDICATIONS  (STANDING):  azithromycin  IVPB 500 milliGRAM(s) IV Intermittent every 24 hours  cefTRIAXone   IVPB 1000 milliGRAM(s) IV Intermittent every 24 hours  enoxaparin Injectable 40 milliGRAM(s) SubCutaneous daily  fluticasone propionate 50 MICROgram(s)/spray Nasal Spray 2 Spray(s) Both Nostrils daily  loratadine 10 milliGRAM(s) Oral daily  melatonin 3 milliGRAM(s) Oral at bedtime  sodium chloride 0.9%. 500 milliLiter(s) (100 mL/Hr) IV Continuous <Continuous>    MEDICATIONS  (PRN):  acetaminophen   Tablet .. 650 milliGRAM(s) Oral every 6 hours PRN Temp greater or equal to 38C (100.4F)  diphenhydrAMINE 25 milliGRAM(s) Oral every 4 hours PRN Rash and/or Itching  guaiFENesin   Syrup  (Sugar-Free) 200 milliGRAM(s) Oral every 6 hours PRN Cough      __________________________________________________  REVIEW OF SYSTEMS:    CONSTITUTIONAL: No fever,   EYES: no acute visual disturbances  NECK: No pain or stiffness  RESPIRATORY:  mild shortness of breath  CARDIOVASCULAR: No chest pain, no palpitations  GASTROINTESTINAL: No pain. No nausea or vomiting; No diarrhea   NEUROLOGICAL: No headache or numbness, no tremors  MUSCULOSKELETAL: No joint pain, no muscle pain  GENITOURINARY: no dysuria, no frequency, no hesitancy  PSYCHIATRY: no depression , no anxiety  ALL OTHER  ROS negative        Vital Signs Last 24 Hrs  T(C): 36.8 (19 Mar 2020 14:00), Max: 37.8 (18 Mar 2020 21:21)  T(F): 98.3 (19 Mar 2020 14:00), Max: 100 (18 Mar 2020 21:21)  HR: 89 (19 Mar 2020 14:00) (89 - 98)  BP: 157/87 (19 Mar 2020 14:00) (137/83 - 157/87)  BP(mean): --  RR: 18 (19 Mar 2020 14:00) (18 - 18)  SpO2: 96% (19 Mar 2020 14:00) (96% - 100%)    ________________________________________________  PHYSICAL EXAM:  GENERAL: NAD  HEENT: Normocephalic;  conjunctivae and sclerae clear; moist mucous membranes;   NECK : supple  CHEST/LUNG: Clear to auscultation bilaterally with good air entry   HEART: S1 S2  regular; no murmurs, gallops or rubs  ABDOMEN: Soft, Nontender, Nondistended; Bowel sounds present  EXTREMITIES: no cyanosis; no edema; no calf tenderness  SKIN: warm and dry; no rash  NERVOUS SYSTEM:  Awake and alert; Oriented  to place, person and time ; no new deficits    _________________________________________________  LABS:                        13.0   7.94  )-----------( 231      ( 19 Mar 2020 06:33 )             38.9     03-19    138  |  104  |  9   ----------------------------<  115<H>  4.4   |  28  |  0.81    Ca    8.4      19 Mar 2020 04:23  Phos  1.2     -18  Mg     2.2     -18    TPro  7.8  /  Alb  2.9<L>  /  TBili  1.2  /  DBili  0.3<H>  /  AST  21  /  ALT  40  /  AlkPhos  46  03-18    PT/INR - ( 18 Mar 2020 00:20 )   PT: 14.0 sec;   INR: 1.23 ratio         PTT - ( 18 Mar 2020 00:20 )  PTT:30.6 sec  Urinalysis Basic - ( 18 Mar 2020 03:47 )    Color: Yellow / Appearance: Clear / S.015 / pH: x  Gluc: x / Ketone: Moderate  / Bili: Negative / Urobili: 4   Blood: x / Protein: 15 / Nitrite: Negative   Leuk Esterase: Negative / RBC: 5-10 /HPF / WBC 0-2 /HPF   Sq Epi: x / Non Sq Epi: Few /HPF / Bacteria: Few /HPF      CAPILLARY BLOOD GLUCOSE            RADIOLOGY & ADDITIONAL TESTS:    Imaging Personally Reviewed:  YES/NO    Consultant(s) Notes Reviewed:   YES/ No    Care Discussed with Consultants :     Plan of care was discussed with patient and /or primary care giver; all questions and concerns were addressed and care was aligned with patient's wishes.

## 2024-04-25 LAB
ANA SER QL: NEGATIVE
MITOCHONDRIA M2 IGG SER-ACNC: <20 UNITS (ref 0–20)
SMA IGG SER-ACNC: 4 UNITS (ref 0–19)

## 2024-05-01 LAB
A1AT PHENOTYP SERPL IFE: NORMAL
A1AT SERPL-MCNC: 148 MG/DL (ref 101–187)

## 2024-05-06 ENCOUNTER — OFFICE VISIT (OUTPATIENT)
Dept: FAMILY MEDICINE CLINIC | Facility: CLINIC | Age: 49
End: 2024-05-06
Payer: MEDICARE

## 2024-05-06 VITALS
HEIGHT: 64 IN | SYSTOLIC BLOOD PRESSURE: 120 MMHG | TEMPERATURE: 97.7 F | OXYGEN SATURATION: 99 % | WEIGHT: 207 LBS | BODY MASS INDEX: 35.34 KG/M2 | DIASTOLIC BLOOD PRESSURE: 72 MMHG | HEART RATE: 70 BPM

## 2024-05-06 DIAGNOSIS — R39.89 SUSPECTED UTI: Primary | ICD-10-CM

## 2024-05-06 DIAGNOSIS — R30.9 PAIN WITH URINATION: ICD-10-CM

## 2024-05-06 DIAGNOSIS — R82.998 LEUKOCYTES IN URINE: ICD-10-CM

## 2024-05-06 LAB
BILIRUB BLD-MCNC: NEGATIVE MG/DL
CLARITY, POC: CLEAR
COLOR UR: YELLOW
GLUCOSE UR STRIP-MCNC: ABNORMAL MG/DL
KETONES UR QL: NEGATIVE
LEUKOCYTE EST, POC: ABNORMAL
NITRITE UR-MCNC: NEGATIVE MG/ML
PH UR: 6 [PH] (ref 5–8)
PROT UR STRIP-MCNC: NEGATIVE MG/DL
RBC # UR STRIP: ABNORMAL /UL
SP GR UR: 1 (ref 1–1.03)
UROBILINOGEN UR QL: ABNORMAL

## 2024-05-06 PROCEDURE — 87186 SC STD MICRODIL/AGAR DIL: CPT | Performed by: NURSE PRACTITIONER

## 2024-05-06 PROCEDURE — 1159F MED LIST DOCD IN RCRD: CPT | Performed by: NURSE PRACTITIONER

## 2024-05-06 PROCEDURE — 87086 URINE CULTURE/COLONY COUNT: CPT | Performed by: NURSE PRACTITIONER

## 2024-05-06 PROCEDURE — 87088 URINE BACTERIA CULTURE: CPT | Performed by: NURSE PRACTITIONER

## 2024-05-06 PROCEDURE — 99213 OFFICE O/P EST LOW 20 MIN: CPT | Performed by: NURSE PRACTITIONER

## 2024-05-06 PROCEDURE — 81002 URINALYSIS NONAUTO W/O SCOPE: CPT | Performed by: NURSE PRACTITIONER

## 2024-05-06 PROCEDURE — 1160F RVW MEDS BY RX/DR IN RCRD: CPT | Performed by: NURSE PRACTITIONER

## 2024-05-06 RX ORDER — SULFAMETHOXAZOLE AND TRIMETHOPRIM 800; 160 MG/1; MG/1
1 TABLET ORAL 2 TIMES DAILY
Qty: 10 TABLET | Refills: 0 | Status: SHIPPED | OUTPATIENT
Start: 2024-05-06

## 2024-05-08 LAB — BACTERIA SPEC AEROBE CULT: ABNORMAL

## 2024-07-03 ENCOUNTER — TELEPHONE (OUTPATIENT)
Dept: FAMILY MEDICINE CLINIC | Facility: CLINIC | Age: 49
End: 2024-07-03

## 2024-07-03 DIAGNOSIS — J45.30 MILD PERSISTENT ASTHMA WITHOUT COMPLICATION: ICD-10-CM

## 2024-07-03 NOTE — TELEPHONE ENCOUNTER
Caller: Hattie Pringle    Relationship: Self    Best call back number: [phone]    Requested Prescriptions:   Requested Prescriptions      No prescriptions requested or ordered in this encounter      ALBUTEROL NEBULIZER     Pharmacy where request should be sent: [id number]  [place] - [address]  AT [facility] [place] - [phone]  - [phone] FX     Last office visit with prescribing clinician: 4/16/2024   Last telemedicine visit with prescribing clinician: Visit date not found   Next office visit with prescribing clinician: Visit date not found     Additional details provided by patient:      Does the patient have less than a 3 day supply:  [x] Yes  [] No    Would you like a call back once the refill request has been completed: [] Yes [x] No    If the office needs to give you a call back, can they leave a voicemail: [] Yes [x] No    Hari Urena Rep   07/03/24 14:25 EDT

## 2024-07-16 ENCOUNTER — HOSPITAL ENCOUNTER (OUTPATIENT)
Dept: CT IMAGING | Facility: HOSPITAL | Age: 49
Discharge: HOME | End: 2024-07-16
Admitting: FAMILY MEDICINE
Payer: MEDICARE

## 2024-07-16 ENCOUNTER — OFFICE VISIT (OUTPATIENT)
Dept: FAMILY MEDICINE CLINIC | Facility: CLINIC | Age: 49
End: 2024-07-16
Payer: MEDICARE

## 2024-07-16 VITALS
BODY MASS INDEX: 33.01 KG/M2 | SYSTOLIC BLOOD PRESSURE: 92 MMHG | DIASTOLIC BLOOD PRESSURE: 72 MMHG | WEIGHT: 192.3 LBS | TEMPERATURE: 97.8 F | HEART RATE: 119 BPM | OXYGEN SATURATION: 98 %

## 2024-07-16 DIAGNOSIS — R10.30 LOWER ABDOMINAL PAIN: Primary | ICD-10-CM

## 2024-07-16 DIAGNOSIS — K46.9 NON-RECURRENT ABDOMINAL HERNIA WITHOUT OBSTRUCTION OR GANGRENE, UNSPECIFIED HERNIA TYPE: Primary | ICD-10-CM

## 2024-07-16 DIAGNOSIS — R10.30 LOWER ABDOMINAL PAIN: ICD-10-CM

## 2024-07-16 LAB
ALBUMIN SERPL-MCNC: 4.1 G/DL (ref 3.5–5.2)
ALBUMIN/GLOB SERPL: 1.6 G/DL
ALP SERPL-CCNC: 51 U/L (ref 39–117)
ALT SERPL W P-5'-P-CCNC: 7 U/L (ref 1–33)
ANION GAP SERPL CALCULATED.3IONS-SCNC: 9.7 MMOL/L (ref 5–15)
AST SERPL-CCNC: 10 U/L (ref 1–32)
BASOPHILS # BLD AUTO: 0.06 10*3/MM3 (ref 0–0.2)
BASOPHILS NFR BLD AUTO: 0.7 % (ref 0–1.5)
BILIRUB SERPL-MCNC: 0.5 MG/DL (ref 0–1.2)
BUN SERPL-MCNC: 11 MG/DL (ref 6–20)
BUN/CREAT SERPL: 14.9 (ref 7–25)
CALCIUM SPEC-SCNC: 9 MG/DL (ref 8.6–10.5)
CHLORIDE SERPL-SCNC: 104 MMOL/L (ref 98–107)
CO2 SERPL-SCNC: 23.3 MMOL/L (ref 22–29)
CREAT SERPL-MCNC: 0.74 MG/DL (ref 0.57–1)
DEPRECATED RDW RBC AUTO: 39.2 FL (ref 37–54)
EGFRCR SERPLBLD CKD-EPI 2021: 100.6 ML/MIN/1.73
EOSINOPHIL # BLD AUTO: 0.19 10*3/MM3 (ref 0–0.4)
EOSINOPHIL NFR BLD AUTO: 2.1 % (ref 0.3–6.2)
ERYTHROCYTE [DISTWIDTH] IN BLOOD BY AUTOMATED COUNT: 12.1 % (ref 12.3–15.4)
GLOBULIN UR ELPH-MCNC: 2.5 GM/DL
GLUCOSE SERPL-MCNC: 77 MG/DL (ref 65–99)
HCT VFR BLD AUTO: 42.9 % (ref 34–46.6)
HGB BLD-MCNC: 14.3 G/DL (ref 12–15.9)
IMM GRANULOCYTES # BLD AUTO: 0.04 10*3/MM3 (ref 0–0.05)
IMM GRANULOCYTES NFR BLD AUTO: 0.4 % (ref 0–0.5)
LIPASE SERPL-CCNC: 41 U/L (ref 13–60)
LYMPHOCYTES # BLD AUTO: 2.27 10*3/MM3 (ref 0.7–3.1)
LYMPHOCYTES NFR BLD AUTO: 25.4 % (ref 19.6–45.3)
MCH RBC QN AUTO: 30 PG (ref 26.6–33)
MCHC RBC AUTO-ENTMCNC: 33.3 G/DL (ref 31.5–35.7)
MCV RBC AUTO: 89.9 FL (ref 79–97)
MONOCYTES # BLD AUTO: 0.61 10*3/MM3 (ref 0.1–0.9)
MONOCYTES NFR BLD AUTO: 6.8 % (ref 5–12)
NEUTROPHILS NFR BLD AUTO: 5.78 10*3/MM3 (ref 1.7–7)
NEUTROPHILS NFR BLD AUTO: 64.6 % (ref 42.7–76)
NRBC BLD AUTO-RTO: 0 /100 WBC (ref 0–0.2)
PLATELET # BLD AUTO: 346 10*3/MM3 (ref 140–450)
PMV BLD AUTO: 8.1 FL (ref 6–12)
POTASSIUM SERPL-SCNC: 4.1 MMOL/L (ref 3.5–5.2)
PROT SERPL-MCNC: 6.6 G/DL (ref 6–8.5)
RBC # BLD AUTO: 4.77 10*6/MM3 (ref 3.77–5.28)
SODIUM SERPL-SCNC: 137 MMOL/L (ref 136–145)
WBC NRBC COR # BLD AUTO: 8.95 10*3/MM3 (ref 3.4–10.8)

## 2024-07-16 PROCEDURE — 85025 COMPLETE CBC W/AUTO DIFF WBC: CPT | Performed by: FAMILY MEDICINE

## 2024-07-16 PROCEDURE — 25510000001 IOPAMIDOL PER 1 ML: Performed by: FAMILY MEDICINE

## 2024-07-16 PROCEDURE — 99213 OFFICE O/P EST LOW 20 MIN: CPT | Performed by: FAMILY MEDICINE

## 2024-07-16 PROCEDURE — 80053 COMPREHEN METABOLIC PANEL: CPT | Performed by: FAMILY MEDICINE

## 2024-07-16 PROCEDURE — 74177 CT ABD & PELVIS W/CONTRAST: CPT

## 2024-07-16 PROCEDURE — 83690 ASSAY OF LIPASE: CPT | Performed by: FAMILY MEDICINE

## 2024-07-16 PROCEDURE — 1159F MED LIST DOCD IN RCRD: CPT | Performed by: FAMILY MEDICINE

## 2024-07-16 PROCEDURE — 1160F RVW MEDS BY RX/DR IN RCRD: CPT | Performed by: FAMILY MEDICINE

## 2024-07-16 PROCEDURE — 1126F AMNT PAIN NOTED NONE PRSNT: CPT | Performed by: FAMILY MEDICINE

## 2024-07-16 RX ADMIN — IOPAMIDOL 100 ML: 755 INJECTION, SOLUTION INTRAVENOUS at 15:56

## 2024-07-16 NOTE — PROGRESS NOTES
Venipuncture Blood Specimen Collection  Venipuncture performed in left arm by Connie Montes De Oca with good hemostasis. Patient tolerated the procedure well without complications.   07/16/24   Eneida Watts

## 2024-07-16 NOTE — PROGRESS NOTES
Chief Complaint  Abdominal Pain (Has been taking the wegovy ans when she eats and drinks she starts to get pain. Even just laying on her abdominal. )    Subjective      Hattie Pringle is a 49 y.o. female who presents to [facility]     Abdominal pain.  She was recently started on Wegovy about a month ago.  When she eats and drinks she starts to get pain.  She also has pain even when just lying on her abdomen. Symptoms started about a month ago. Gets diarrhea all the time, new since the Wegovy. No nausea or vomiting. No urinary symptoms. Abdominal pain is lower quadrants bilaterally. Pain is a crampy pain. She had a gastric sleeve 3 years ago. No blood in the stool. Gets pain even when drinking water.     Objective   Vital Signs:   Vitals:    07/16/24 0825   BP: 92/72   BP Location: Left arm   Patient Position: Sitting   Pulse: 119   Temp: 97.8 °F (36.6 °C)   TempSrc: Temporal   SpO2: 98%   Weight: 87.2 kg (192 lb 4.8 oz)     Body mass index is 33.01 kg/m².    Wt Readings from Last 3 Encounters:   07/16/24 87.2 kg (192 lb 4.8 oz)   05/06/24 93.9 kg (207 lb)   04/16/24 94.3 kg (208 lb)     BP Readings from Last 3 Encounters:   07/16/24 92/72   05/06/24 120/72   04/16/24 122/72       Health Maintenance   Topic Date Due    COVID-19 Vaccine (7 - 2023-24 season) 09/01/2023    INFLUENZA VACCINE  08/01/2024    BMI FOLLOWUP  08/09/2024    ANNUAL WELLNESS VISIT  01/02/2025    LIPID PANEL  01/02/2025    MAMMOGRAM  01/22/2026    TDAP/TD VACCINES (2 - Td or Tdap) 02/23/2032    COLORECTAL CANCER SCREENING  03/23/2033    HEPATITIS C SCREENING  Completed    Pneumococcal Vaccine 0-64  Completed       Physical Exam  Vitals and nursing note reviewed.   Constitutional:       General: She is not in acute distress.     Appearance: Normal appearance.   Cardiovascular:      Rate and Rhythm: Normal rate and regular rhythm.   Pulmonary:      Effort: Pulmonary effort is normal. No respiratory distress.       Breath sounds: Normal breath sounds. No wheezing.   Abdominal:      General: There is no distension.      Palpations: Abdomen is soft. There is no mass.      Tenderness: There is no right CVA tenderness or left CVA tenderness.      Comments: Diffuse lower abdominal tenderness   Skin:     General: Skin is warm and dry.   Neurological:      Mental Status: She is alert.   Psychiatric:         Mood and Affect: Mood normal.         Behavior: Behavior normal.          Result Review :  The following data was reviewed by: Nirmal Smalls MD on 07/16/2024:         Procedures          Assessment & Plan  Lower abdominal pain  Given crampy abdominal pain and tenderness that worsens with eating or drinking anything, and this all started since starting the GLP-1 inhibitor, I am suspicious that the Wegovy is causing her symptoms.  She had a sleeve gastrectomy surgery which increases her risk of side effects from the GLP-1.  I advised stopping the Wegovy completely.  She last took the Wegovy yesterday on Monday.    I am also ordering some lab workup and an urgent CT scan of her abdomen given her severe pain.  She has lower blood pressure today at 92/72 but has no lightheadedness or dizziness, she does endorse a slight headache.  This may be secondary to dehydration.  I advised her to stay as well-hydrated as she can.  I advised her to go to the ER if anything worsens.    Orders Placed This Encounter   Procedures    CT Abdomen Pelvis With Contrast    Comprehensive Metabolic Panel    Lipase    CBC Auto Differential    CBC & Differential                       FOLLOW UP  Return if symptoms worsen or fail to improve.  Patient was given instructions and counseling regarding her condition or for health maintenance advice. Please see specific information pulled into the AVS if appropriate.       Nirmal Smalls MD  07/16/24  08:47 EDT    CURRENT & DISCONTINUED MEDICATIONS  Current Outpatient Medications   Medication Instructions     albuterol sulfate  (90 Base) MCG/ACT inhaler 2 puffs, Inhalation, Every 6 Hours PRN    cyclobenzaprine (FLEXERIL) 10 mg, Oral, 3 Times Daily PRN    diclofenac (VOLTAREN) 75 MG EC tablet     folic acid (FOLVITE) 1 mg, Oral, Daily    rOPINIRole (REQUIP) 0.5 mg, Oral, Nightly, Take 1 hour before bedtime.     Semaglutide-Weight Management (WEGOVY SC) Subcutaneous    vitamin B-12 (CYANOCOBALAMIN) 2,500 mcg, Oral, Daily, taking 5 tabs daily        Medications Discontinued During This Encounter   Medication Reason    sulfamethoxazole-trimethoprim (Bactrim DS) 800-160 MG per tablet *Therapy completed    vitamin D (ERGOCALCIFEROL) 1.25 MG (77674 UT) capsule capsule *Therapy completed

## 2024-07-19 ENCOUNTER — CLINICAL SUPPORT (OUTPATIENT)
Dept: FAMILY MEDICINE CLINIC | Facility: CLINIC | Age: 49
End: 2024-07-19
Payer: MEDICARE

## 2024-07-19 DIAGNOSIS — R39.89 SUSPECTED UTI: Primary | ICD-10-CM

## 2024-07-19 PROCEDURE — 87186 SC STD MICRODIL/AGAR DIL: CPT | Performed by: FAMILY MEDICINE

## 2024-07-19 PROCEDURE — 87088 URINE BACTERIA CULTURE: CPT | Performed by: FAMILY MEDICINE

## 2024-07-19 PROCEDURE — 87086 URINE CULTURE/COLONY COUNT: CPT | Performed by: FAMILY MEDICINE

## 2024-07-21 LAB — BACTERIA SPEC AEROBE CULT: ABNORMAL

## 2024-08-02 RX ORDER — NITROFURANTOIN 25; 75 MG/1; MG/1
CAPSULE ORAL
COMMUNITY
Start: 2024-07-19

## 2024-08-02 RX ORDER — ONDANSETRON 4 MG/1
TABLET, FILM COATED ORAL
COMMUNITY
Start: 2024-06-25

## 2024-08-05 ENCOUNTER — OFFICE VISIT (OUTPATIENT)
Dept: SURGERY | Facility: CLINIC | Age: 49
End: 2024-08-05
Payer: MEDICARE

## 2024-08-05 VITALS — BODY MASS INDEX: 32.78 KG/M2 | WEIGHT: 192 LBS | RESPIRATION RATE: 18 BRPM | HEIGHT: 64 IN

## 2024-08-05 DIAGNOSIS — K43.9 VENTRAL HERNIA WITHOUT OBSTRUCTION OR GANGRENE: Primary | ICD-10-CM

## 2024-08-05 RX ORDER — SODIUM CHLORIDE 0.9 % (FLUSH) 0.9 %
1-10 SYRINGE (ML) INJECTION AS NEEDED
OUTPATIENT
Start: 2024-08-05

## 2024-08-05 RX ORDER — SODIUM CHLORIDE 9 MG/ML
40 INJECTION, SOLUTION INTRAVENOUS AS NEEDED
OUTPATIENT
Start: 2024-08-05

## 2024-08-05 RX ORDER — SODIUM CHLORIDE 0.9 % (FLUSH) 0.9 %
10 SYRINGE (ML) INJECTION EVERY 12 HOURS SCHEDULED
OUTPATIENT
Start: 2024-08-05
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
## 2024-09-06 RX ORDER — CYANOCOBALAMIN (VITAMIN B-12) 500 MCG
500 TABLET ORAL DAILY
COMMUNITY

## 2024-09-06 NOTE — PRE-PROCEDURE INSTRUCTIONS
ATIENT INSTRUCTED TO BE:    - NOTHING TO EAT AFTER MIDNIGHT OR CHEW, EXCEPT CAN HAVE CLEAR LIQUIDS 2 HOURS PRIOR TO SURGERY ARRIVAL TIME , NO MORE THAN 8 OZ. (NOTHING RED)     - TO HOLD ALL VITAMINS, SUPPLEMENTS, NSAIDS FOR ONE WEEK PRIOR TO THEIR SURGICAL PROCEDURE    - DO NOT TAKE _________________NA_____ 7 DAYS PRIOR TO PROCEDURE PER ANESTHESIA RECOMMENDATIONS/INSTRUCTIONS     - INSTRUCTED PT TO USE SURGICAL SOAP 1 TIME THE NIGHT PRIOR TO SURGERY ___________ OR THE AM OF SURGERY _____________   USE THE SOAP FROM NECK TO TOES, AVOID THEIR FACE, HAIR, AND PRIVATE PARTS. IF USE THE SOAP THE NIGHT PRIOR TO SURGERY, CHANGE BED LINENS AND NO PETS IN THE BED.     INSTRUCTED NO LOTIONS, JEWELRY, PIERCINGS,  NAIL POLISH, OR DEODORANT DAY OF SURGERY    - IF DIABETIC, CHECK BLOOD GLUCOSE IF LESS THAN 70 OR HAVING SYMPTOMS CALL THE PREOP AREA FOR INSTRUCTIONS ON AM OF SURGERY ([phone])    -INSTRUCTED TO TAKE THE FOLLOWING MEDICATIONS THE DAY OF SURGERY WITH SIPS OF WATER: ALBUTEROL INH          - DO NOT BRING ANY MEDICATIONS WITH YOU TO THE HOSPITAL THE DAY OF SURGERY, EXCEPT IF USE INHALERS. BRING INHALERS DAY OF SURGERY       - BRING CPAP OR BIPAP TO THE HOSPITAL ONLY IF YOU ARE SPENDING THE NIGHT    - DO NOT SMOKE OR VAPE 24 HOURS PRIOR TO PROCEDURE PER ANESTHESIA REQUEST     -MAKE SURE YOU HAVE A RIDE HOME OR SOMEONE TO STAY WITH YOU THE DAY OF THE PROCEDURE AFTER YOU GO HOME     - FOLLOW ANY OTHER INSTRUCTIONS GIVEN TO YOU BY YOUR SURGEON'S OFFICE.     - DAY OF SURGERY __ COME TO [facility]/ [facility], [facility] FLOOR. CHECK IN AT THE DESK FOR REGISTRATION/SURGERY    - YOU WILL RECEIVE A PHONE CALL THE DAY PRIOR TO SURGERY BETWEEN 1PM AND 4 PM WITH ARRIVAL TIME, IF YOUR SURGERY IS ON A MONDAY YOU WILL RECEIVE A CALL THE FRIDAY PRIOR TO SURGERY DATE    - BRING CASH OR CREDIT CARD FOR COPAYMENT OF MEDICATIONS AFTER SURGERY IF YOU USE THE HOSPITAL PHARMACY (MEDS TO BED)  NA  - PREADMISSION TESTING NURSE MODE CAIN  [phone] IF HAVE ANY QUESTIONS     -PATIENT PROVIDED THE NUMBER FOR PREOP SURGICAL DEPT IF HAD QUESTIONS AFTER HOURS PRIOR TO SURGERY ([phone]).  INFORMED PT IF NO ANSWER, LEAVE A MESSAGE AND SOMEONE WILL RETURN THEIR CALL       PATIENT VERBALIZED UNDERSTANDING

## 2024-09-09 ENCOUNTER — ANESTHESIA EVENT (OUTPATIENT)
Dept: PERIOP | Facility: HOSPITAL | Age: 49
End: 2024-09-09
Payer: MEDICARE

## 2024-09-10 ENCOUNTER — ANESTHESIA (OUTPATIENT)
Dept: PERIOP | Facility: HOSPITAL | Age: 49
End: 2024-09-10
Payer: MEDICARE

## 2024-09-10 ENCOUNTER — HOSPITAL ENCOUNTER (OUTPATIENT)
Facility: HOSPITAL | Age: 49
Setting detail: HOSPITAL OUTPATIENT SURGERY
Discharge: HOME | End: 2024-09-10
Attending: SURGERY | Admitting: SURGERY
Payer: MEDICARE

## 2024-09-10 VITALS
OXYGEN SATURATION: 96 % | WEIGHT: 195.77 LBS | BODY MASS INDEX: 33.42 KG/M2 | HEIGHT: 64 IN | RESPIRATION RATE: 22 BRPM | TEMPERATURE: 98.2 F | SYSTOLIC BLOOD PRESSURE: 114 MMHG | HEART RATE: 64 BPM | DIASTOLIC BLOOD PRESSURE: 79 MMHG

## 2024-09-10 DIAGNOSIS — K43.9 VENTRAL HERNIA WITHOUT OBSTRUCTION OR GANGRENE: ICD-10-CM

## 2024-09-10 PROCEDURE — 25010000002 MIDAZOLAM PER 1MG: Performed by: ANESTHESIOLOGY

## 2024-09-10 PROCEDURE — 25810000003 LACTATED RINGERS PER 1000 ML: Performed by: ANESTHESIOLOGY

## 2024-09-10 PROCEDURE — 25010000002 BUPIVACAINE (PF) 0.25 % SOLUTION: Performed by: SURGERY

## 2024-09-10 PROCEDURE — 25010000002 ONDANSETRON PER 1 MG: Performed by: NURSE ANESTHETIST, CERTIFIED REGISTERED

## 2024-09-10 PROCEDURE — S0260 H&P FOR SURGERY: HCPCS | Performed by: SURGERY

## 2024-09-10 PROCEDURE — 25010000002 CEFAZOLIN PER 500 MG: Performed by: SURGERY

## 2024-09-10 PROCEDURE — 25010000002 HYDROMORPHONE 1 MG/ML SOLUTION: Performed by: ANESTHESIOLOGY

## 2024-09-10 PROCEDURE — C1781 MESH (IMPLANTABLE): HCPCS | Performed by: SURGERY

## 2024-09-10 PROCEDURE — 25010000002 PROPOFOL 10 MG/ML EMULSION: Performed by: NURSE ANESTHETIST, CERTIFIED REGISTERED

## 2024-09-10 PROCEDURE — 25010000002 PROPOFOL 200 MG/20ML EMULSION

## 2024-09-10 PROCEDURE — 25010000002 FENTANYL CITRATE (PF) 50 MCG/ML SOLUTION

## 2024-09-10 PROCEDURE — 25010000002 MEPERIDINE PER 100 MG

## 2024-09-10 PROCEDURE — 25010000002 DEXAMETHASONE PER 1 MG

## 2024-09-10 PROCEDURE — 49593 RPR AA HRN 1ST 3-10 RDC: CPT | Performed by: SURGERY

## 2024-09-10 DEVICE — DEV CONTRL TISS STRATAFIX SPIRAL PLS PDS CT1 2/0 22CM: Type: IMPLANTABLE DEVICE | Site: ABDOMEN | Status: FUNCTIONAL

## 2024-09-10 DEVICE — ABSORBABLE WOUND CLOSURE DEVICE
Type: IMPLANTABLE DEVICE | Site: ABDOMEN | Status: FUNCTIONAL
Brand: V-LOC 180

## 2024-09-10 DEVICE — VENTRALIGHT ST MESH WITH ECHO PS POSITIONING SYSTEM
Type: IMPLANTABLE DEVICE | Site: ABDOMEN | Status: FUNCTIONAL
Brand: VENTRALIGHT ST MESH WITH ECHO PS POSITIONING SYSTEM

## 2024-09-10 RX ORDER — SODIUM CHLORIDE 0.9 % (FLUSH) 0.9 %
10 SYRINGE (ML) INJECTION EVERY 12 HOURS SCHEDULED
Status: DISCONTINUED | OUTPATIENT
Start: 2024-09-10 | End: 2024-09-10 | Stop reason: HOSPADM

## 2024-09-10 RX ORDER — SCOLOPAMINE TRANSDERMAL SYSTEM 1 MG/1
1 PATCH, EXTENDED RELEASE TRANSDERMAL ONCE
Status: DISCONTINUED | OUTPATIENT
Start: 2024-09-10 | End: 2024-09-10 | Stop reason: HOSPADM

## 2024-09-10 RX ORDER — SODIUM CHLORIDE 9 MG/ML
40 INJECTION, SOLUTION INTRAVENOUS AS NEEDED
Status: DISCONTINUED | OUTPATIENT
Start: 2024-09-10 | End: 2024-09-10 | Stop reason: HOSPADM

## 2024-09-10 RX ORDER — ONDANSETRON 4 MG/1
4 TABLET, ORALLY DISINTEGRATING ORAL ONCE AS NEEDED
Status: DISCONTINUED | OUTPATIENT
Start: 2024-09-10 | End: 2024-09-10 | Stop reason: HOSPADM

## 2024-09-10 RX ORDER — MIDAZOLAM HYDROCHLORIDE 2 MG/2ML
2 INJECTION, SOLUTION INTRAMUSCULAR; INTRAVENOUS ONCE
Status: COMPLETED | OUTPATIENT
Start: 2024-09-10 | End: 2024-09-10

## 2024-09-10 RX ORDER — ACETAMINOPHEN 500 MG
1000 TABLET ORAL ONCE
Status: COMPLETED | OUTPATIENT
Start: 2024-09-10 | End: 2024-09-10

## 2024-09-10 RX ORDER — PROPOFOL 10 MG/ML
INJECTION, EMULSION INTRAVENOUS AS NEEDED
Status: DISCONTINUED | OUTPATIENT
Start: 2024-09-10 | End: 2024-09-10 | Stop reason: SURG

## 2024-09-10 RX ORDER — PROMETHAZINE HYDROCHLORIDE 25 MG/1
25 SUPPOSITORY RECTAL ONCE AS NEEDED
Status: DISCONTINUED | OUTPATIENT
Start: 2024-09-10 | End: 2024-09-10 | Stop reason: HOSPADM

## 2024-09-10 RX ORDER — OXYCODONE AND ACETAMINOPHEN 5; 325 MG/1; MG/1
1 TABLET ORAL EVERY 6 HOURS PRN
Qty: 20 TABLET | Refills: 0 | Status: SHIPPED | OUTPATIENT
Start: 2024-09-10

## 2024-09-10 RX ORDER — OXYCODONE HYDROCHLORIDE 5 MG/1
5 TABLET ORAL
Status: DISCONTINUED | OUTPATIENT
Start: 2024-09-10 | End: 2024-09-10 | Stop reason: HOSPADM

## 2024-09-10 RX ORDER — MEPERIDINE HYDROCHLORIDE 25 MG/ML
12.5 INJECTION INTRAMUSCULAR; INTRAVENOUS; SUBCUTANEOUS
Status: DISCONTINUED | OUTPATIENT
Start: 2024-09-10 | End: 2024-09-10 | Stop reason: HOSPADM

## 2024-09-10 RX ORDER — DEXAMETHASONE SODIUM PHOSPHATE 4 MG/ML
INJECTION, SOLUTION INTRA-ARTICULAR; INTRALESIONAL; INTRAMUSCULAR; INTRAVENOUS; SOFT TISSUE AS NEEDED
Status: DISCONTINUED | OUTPATIENT
Start: 2024-09-10 | End: 2024-09-10 | Stop reason: SURG

## 2024-09-10 RX ORDER — SODIUM CHLORIDE 0.9 % (FLUSH) 0.9 %
1-10 SYRINGE (ML) INJECTION AS NEEDED
Status: DISCONTINUED | OUTPATIENT
Start: 2024-09-10 | End: 2024-09-10 | Stop reason: HOSPADM

## 2024-09-10 RX ORDER — ONDANSETRON 2 MG/ML
4 INJECTION INTRAMUSCULAR; INTRAVENOUS ONCE AS NEEDED
Status: DISCONTINUED | OUTPATIENT
Start: 2024-09-10 | End: 2024-09-10 | Stop reason: HOSPADM

## 2024-09-10 RX ORDER — LIDOCAINE HYDROCHLORIDE 20 MG/ML
INJECTION, SOLUTION EPIDURAL; INFILTRATION; INTRACAUDAL; PERINEURAL AS NEEDED
Status: DISCONTINUED | OUTPATIENT
Start: 2024-09-10 | End: 2024-09-10 | Stop reason: SURG

## 2024-09-10 RX ORDER — PROMETHAZINE HYDROCHLORIDE 12.5 MG/1
25 TABLET ORAL ONCE AS NEEDED
Status: DISCONTINUED | OUTPATIENT
Start: 2024-09-10 | End: 2024-09-10 | Stop reason: HOSPADM

## 2024-09-10 RX ORDER — ROCURONIUM BROMIDE 10 MG/ML
INJECTION, SOLUTION INTRAVENOUS AS NEEDED
Status: DISCONTINUED | OUTPATIENT
Start: 2024-09-10 | End: 2024-09-10 | Stop reason: SURG

## 2024-09-10 RX ORDER — FENTANYL CITRATE 50 UG/ML
INJECTION, SOLUTION INTRAMUSCULAR; INTRAVENOUS AS NEEDED
Status: DISCONTINUED | OUTPATIENT
Start: 2024-09-10 | End: 2024-09-10 | Stop reason: SURG

## 2024-09-10 RX ORDER — BUPIVACAINE HYDROCHLORIDE 2.5 MG/ML
INJECTION, SOLUTION EPIDURAL; INFILTRATION; INTRACAUDAL AS NEEDED
Status: DISCONTINUED | OUTPATIENT
Start: 2024-09-10 | End: 2024-09-10 | Stop reason: HOSPADM

## 2024-09-10 RX ORDER — SODIUM CHLORIDE, SODIUM LACTATE, POTASSIUM CHLORIDE, CALCIUM CHLORIDE 600; 310; 30; 20 MG/100ML; MG/100ML; MG/100ML; MG/100ML
9 INJECTION, SOLUTION INTRAVENOUS CONTINUOUS PRN
Status: DISCONTINUED | OUTPATIENT
Start: 2024-09-10 | End: 2024-09-10 | Stop reason: HOSPADM

## 2024-09-10 RX ORDER — ONDANSETRON 2 MG/ML
INJECTION INTRAMUSCULAR; INTRAVENOUS AS NEEDED
Status: DISCONTINUED | OUTPATIENT
Start: 2024-09-10 | End: 2024-09-10 | Stop reason: SURG

## 2024-09-10 RX ADMIN — ROCURONIUM BROMIDE 50 MG: 10 INJECTION, SOLUTION INTRAVENOUS at 16:07

## 2024-09-10 RX ADMIN — FENTANYL CITRATE 50 MCG: 50 INJECTION, SOLUTION INTRAMUSCULAR; INTRAVENOUS at 16:07

## 2024-09-10 RX ADMIN — SODIUM CHLORIDE, POTASSIUM CHLORIDE, SODIUM LACTATE AND CALCIUM CHLORIDE 9 ML/HR: 600; 310; 30; 20 INJECTION, SOLUTION INTRAVENOUS at 14:08

## 2024-09-10 RX ADMIN — SODIUM CHLORIDE 2000 MG: 9 INJECTION, SOLUTION INTRAVENOUS at 16:19

## 2024-09-10 RX ADMIN — PROPOFOL 50 MCG/KG/MIN: 10 INJECTION, EMULSION INTRAVENOUS at 16:07

## 2024-09-10 RX ADMIN — LIDOCAINE HYDROCHLORIDE 100 MG: 20 INJECTION, SOLUTION EPIDURAL; INFILTRATION; INTRACAUDAL; PERINEURAL at 16:07

## 2024-09-10 RX ADMIN — OXYCODONE HYDROCHLORIDE 5 MG: 5 TABLET ORAL at 18:09

## 2024-09-10 RX ADMIN — DEXAMETHASONE SODIUM PHOSPHATE 8 MG: 4 INJECTION, SOLUTION INTRAMUSCULAR; INTRAVENOUS at 16:19

## 2024-09-10 RX ADMIN — OXYCODONE HYDROCHLORIDE 5 MG: 5 TABLET ORAL at 18:34

## 2024-09-10 RX ADMIN — SCOPALAMINE 1 PATCH: 1 PATCH, EXTENDED RELEASE TRANSDERMAL at 14:08

## 2024-09-10 RX ADMIN — HYDROMORPHONE HYDROCHLORIDE 0.5 MG: 1 INJECTION, SOLUTION INTRAMUSCULAR; INTRAVENOUS; SUBCUTANEOUS at 18:29

## 2024-09-10 RX ADMIN — ROCURONIUM BROMIDE 20 MG: 10 INJECTION, SOLUTION INTRAVENOUS at 17:09

## 2024-09-10 RX ADMIN — ONDANSETRON 4 MG: 2 INJECTION INTRAMUSCULAR; INTRAVENOUS at 17:30

## 2024-09-10 RX ADMIN — HYDROMORPHONE HYDROCHLORIDE 0.5 MG: 1 INJECTION, SOLUTION INTRAMUSCULAR; INTRAVENOUS; SUBCUTANEOUS at 18:40

## 2024-09-10 RX ADMIN — MIDAZOLAM HYDROCHLORIDE 2 MG: 1 INJECTION, SOLUTION INTRAMUSCULAR; INTRAVENOUS at 15:52

## 2024-09-10 RX ADMIN — MEPERIDINE HYDROCHLORIDE 12.5 MG: 25 INJECTION INTRAMUSCULAR; INTRAVENOUS; SUBCUTANEOUS at 17:55

## 2024-09-10 RX ADMIN — ACETAMINOPHEN 1000 MG: 500 TABLET ORAL at 14:07

## 2024-09-10 RX ADMIN — SODIUM CHLORIDE, POTASSIUM CHLORIDE, SODIUM LACTATE AND CALCIUM CHLORIDE: 600; 310; 30; 20 INJECTION, SOLUTION INTRAVENOUS at 16:49

## 2024-09-10 RX ADMIN — FENTANYL CITRATE 50 MCG: 50 INJECTION, SOLUTION INTRAMUSCULAR; INTRAVENOUS at 16:19

## 2024-09-10 RX ADMIN — PROPOFOL 150 MG: 10 INJECTION, EMULSION INTRAVENOUS at 16:07
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
## 2024-09-10 NOTE — DISCHARGE INSTRUCTIONS
DISCHARGE INSTRUCTIONS  HERNIA      For your surgery you had:  General anesthesia (you may have a sore throat for the first 24 hours)  IV sedation.  Local anesthesia  Monitored anesthesia care  You received a medicated patch for nausea prevention today (behind your ear). It is recommended that you remove it 24-48 hours post-operatively. It must be removed within 72 hours.   You received an anesthesia medication today that can cause hormonal forms of birth control to be ineffective. You should use a different form of birth control (to prevent pregnancy) for 7 days.  You may experience dizziness, drowsiness, or light-headedness for several hours following surgery/procedure.  Do not stay alone today or tonight.  Limit your activity for 24 hours.  Resume your diet slowly.  Follow whatever special dietary instructions you may have been given by your doctor.  You should not drive, operate machinery, drink alcohol, or sign legally binding documents for 24 hours or while you are taking pain medication.  Last dose of pain medication was given at:   .  NOTIFY YOUR DOCTOR IF YOU EXPERIENCE ANY OF THE FOLLOWING:  Temperature greater than 101 degrees Fahrenheit  Shaking Chills  Redness or excessive drainage from incision  Nausea, vomiting and/or pain that is not controlled by prescribed medications  Increase in bleeding or bleeding that is excessive  Unable to urinate in 6 hours after surgery  If unable to reach your doctor, please go to the closest Emergency Room [] You may remove dressing:   [] in 24 hours   [x] in 48 hours Thursday   [] Other:    [x] You may shower on Thursday   Apply an ice pack for 24-48 hours.  [] Wear a jockey support or tight fitting briefs to prevent  swelling.  Do not do any heavy lifting, pushing or pulling.  You may walk up and down stairs.  You may ride in a car but do not drive until instructed by your physician.  Avoid constipation.  If unable to urinate in 6 to 8 hours after surgery or urinating  frequently in small amounts, notify your doctor or go to the nearest Emergency Room.  Medications per physician instructions as indicated on Discharge Medication Information Sheet.  You should see   for follow-up care   on  .  Phone number:       SPECIAL INSTRUCTIONS:        No lifting more than 5 pounds for four weeks         I have read and received the above instructions.     Patient/Responsible Party's Signature Date/Time     RN Signature Date/Time

## 2024-09-10 NOTE — OP NOTE
VENTRAL / INCISIONAL HERNIA REPAIR LAPAROSCOPIC WITH DAVINCI ROBOT  Procedure Report    Patient Name:  Hattie Pringle  YOB: 1975    Date of Surgery:  9/10/2024     Indications: Incisional hernia    Pre-op Diagnosis:   Ventral hernia without obstruction or gangrene [K43.9]       Post-Op Diagnosis Codes:     * Ventral hernia without obstruction or gangrene [K43.9]    Procedure/CPT® Codes:      Procedure(s):  VENTRAL / INCISIONAL HERNIA REPAIR LAPAROSCOPIC WITH DAVINCI ROBOT WITH MESH    Staff:  Surgeon(s):  Belen Puente MD    Assistant: Shreyas Pimentel    Anesthesia: General    Estimated Blood Loss: minimal    Implants:    Implant Name Type Inv. Item Serial No.  Lot No. LRB No. Used Action   DEV CONTRL TISS STRATAFIX SPIRAL PLS PDS CT1 2/0 22CM - [id number] Implant DEV CONTRL TISS STRATAFIX SPIRAL PLS PDS CT1 2/0 22CM  [employer]  DIV OF J AND J [id number] N/A 2 Implanted   DEV CLS WND VLOC/180 JHON ABS 1/2CIR SZ2/0 23CM 37MM GRN - [id number] Implant DEV CLS WND VLOC/180 JHON ABS 1/2CIR SZ2/0 23CM 37MM GRN  COVIDIEN [id number] N/A 2 Implanted   DEV CLS WND VLOC/180 JHON ABS 1/2CIR SZ0 23CM 37MM GRN - [id number] Implant DEV CLS WND VLOC/180 JHON ABS 1/2CIR SZ0 23CM 37MM GRN  COVIDIEN [id number] N/A 1 Implanted   MESH VENTRALIGHT ST ECHO PS POSTN 6X8 - [id number] Implant MESH VENTRALIGHT ST ECHO PS POSTN 6X8  DAVOL  (DIV OF CR Stupil CO) [id number] N/A 1 Implanted       Specimen:          None        Findings: none    Complications: none    Description of Procedure:   We began by making a small incision in the left upper quadrant.  A Veress needle was inserted in the standard fashion and after  this the abdomen was insufflated with no obvious complications.  The site was  enlarged to a 10/12 incision site and a 10/12 Optiview trocar was used to  access the abdomen under direct visualization.  A 8 mm long trocar was placed in  the left lower quadrant along with 2 additional 8 mm  trocars along the left abdominal wall.  We were able to identify the hernia defect at the umbilicus.  The hernia contents were reduced.  This was done with ease and at this point in time we were able to see that the size of the hernia was about 4 cm in size.   The peritoneum around the edges was dissected off and the hernia sac reduced and then the defect was closed.  A 6 x 8 Echo Bard mesh was placed into the abdomen and the tubing was insufflated after it was brought through  the hernia defect.  It was sutured into place with good coverage over all  sides of the hernia with v-lock suture.   The tubing was cut and the balloon was removed from the abdomen in its entirety and inspected once outside the abdomen.  The Optiview site was closed using a Don-Michael.  Local anesthetic was injected and the skin  was closed with 4-0 sutures at all sites.  The patient tolerated the  procedure well and will follow up with me in one to two weeks.  Assistant: Shreyas Pimentel  was responsible for performing the following activities: Retraction, Suction, and Held/Positioned Camera and their skilled assistance was necessary for the success of this case.         Belen Puente MD     Date: 9/10/2024  Time: 17:41 EDT

## 2024-09-10 NOTE — ANESTHESIA PREPROCEDURE EVALUATION
Anesthesia Evaluation     Patient summary reviewed and Nursing notes reviewed   history of anesthetic complications:  PONV  NPO Solid Status: > 8 hours  NPO Liquid Status: > 2 hours           Airway   Mallampati: II  TM distance: >3 FB  Neck ROM: full  No difficulty expected  Dental      Pulmonary - normal exam    breath sounds clear to auscultation  (+) a smoker (3 years ago) Former, asthma (controlled),  Cardiovascular - normal exam  Exercise tolerance: good (4-7 METS)    Rhythm: regular  Rate: normal    (+) hyperlipidemia      Neuro/Psych- negative ROS  GI/Hepatic/Renal/Endo    (+) obesity, GERD well controlled    ROS Comment: S/p gastric sleeve      Musculoskeletal     Abdominal    Substance History - negative use     OB/GYN negative ob/gyn ROS         Other   arthritis,     ROS/Med Hx Other: PAT Nursing Notes unavailable.               Anesthesia Plan    ASA 2     general     (Patient understands anesthesia not responsible for dental damage.)  intravenous induction     Anesthetic plan, risks, benefits, and alternatives have been provided, discussed and informed consent has been obtained with: patient.    Use of blood products discussed with patient .    Plan discussed with CRNA.    CODE STATUS:

## 2024-09-11 ENCOUNTER — TELEPHONE (OUTPATIENT)
Dept: FAMILY MEDICINE CLINIC | Facility: CLINIC | Age: 49
End: 2024-09-11

## 2024-09-11 NOTE — ANESTHESIA POSTPROCEDURE EVALUATION
Patient: Hattie Pringle    Procedure Summary       Date: 09/10/24 Room / Location: [facility]  /  [facility]    Anesthesia Start: 1602 Anesthesia Stop: 1747    Procedure: VENTRAL / INCISIONAL HERNIA REPAIR LAPAROSCOPIC WITH O4 InternationalINCI ROBOT (Abdomen) Diagnosis:       Ventral hernia without obstruction or gangrene      (Ventral hernia without obstruction or gangrene [K43.9])    Surgeons: Belen Puente MD Provider: Neftali Wright MD    Anesthesia Type: general ASA Status: 2            Anesthesia Type: general    Vitals  Vitals Value Taken Time   /76 09/10/24 1850   Temp 36.7 °C (98.1 °F) 09/10/24 1744   Pulse 68 09/10/24 1859   Resp 24 09/10/24 1850   SpO2 94 % 09/10/24 1859   Vitals shown include unfiled device data.        Post Anesthesia Care and Evaluation    Patient location during evaluation: bedside  Patient participation: complete - patient participated  Level of consciousness: awake  Pain management: adequate    Airway patency: patent  PONV Status: none  Cardiovascular status: acceptable and stable  Respiratory status: acceptable  Hydration status: acceptable

## 2024-09-11 NOTE — TELEPHONE ENCOUNTER
Caller: Hattie Pringle    Relationship: Self    Best call back number: [phone]    What is the best time to reach you: ANYTIME    Who are you requesting to speak with (clinical staff, provider,  specific staff member): NURSE       What was the call regarding: PATIENT IS CALLING WANTING TO KNOW IF SHE NEEDS TO DO A FOLLOW UP WITH PCP AFTER HER SURGERY YESTERDAY FOR HERNIA

## 2024-09-18 ENCOUNTER — OFFICE VISIT (OUTPATIENT)
Dept: SURGERY | Facility: CLINIC | Age: 49
End: 2024-09-18
Payer: MEDICARE

## 2024-09-18 VITALS — RESPIRATION RATE: 18 BRPM | HEIGHT: 64 IN | BODY MASS INDEX: 32.95 KG/M2 | WEIGHT: 193 LBS

## 2024-09-18 DIAGNOSIS — K43.9 VENTRAL HERNIA WITHOUT OBSTRUCTION OR GANGRENE: Primary | ICD-10-CM

## 2024-09-18 PROCEDURE — 99212 OFFICE O/P EST SF 10 MIN: CPT | Performed by: SURGERY

## 2024-09-18 PROCEDURE — 1159F MED LIST DOCD IN RCRD: CPT | Performed by: SURGERY

## 2024-09-18 PROCEDURE — 1160F RVW MEDS BY RX/DR IN RCRD: CPT | Performed by: SURGERY

## 2024-09-18 PROCEDURE — 99024 POSTOP FOLLOW-UP VISIT: CPT | Performed by: SURGERY

## 2024-10-10 ENCOUNTER — OFFICE VISIT (OUTPATIENT)
Dept: FAMILY MEDICINE CLINIC | Facility: CLINIC | Age: 49
End: 2024-10-10
Payer: MEDICARE

## 2024-10-10 VITALS
DIASTOLIC BLOOD PRESSURE: 70 MMHG | TEMPERATURE: 98.4 F | WEIGHT: 194 LBS | BODY MASS INDEX: 33.3 KG/M2 | SYSTOLIC BLOOD PRESSURE: 130 MMHG | OXYGEN SATURATION: 95 % | HEART RATE: 112 BPM

## 2024-10-10 DIAGNOSIS — J01.00 ACUTE NON-RECURRENT MAXILLARY SINUSITIS: ICD-10-CM

## 2024-10-10 DIAGNOSIS — R05.1 ACUTE COUGH: Primary | ICD-10-CM

## 2024-10-10 DIAGNOSIS — R51.9 NONINTRACTABLE HEADACHE, UNSPECIFIED CHRONICITY PATTERN, UNSPECIFIED HEADACHE TYPE: ICD-10-CM

## 2024-10-10 LAB
EXPIRATION DATE: NORMAL
FLUAV AG UPPER RESP QL IA.RAPID: NOT DETECTED
FLUBV AG UPPER RESP QL IA.RAPID: NOT DETECTED
INTERNAL CONTROL: NORMAL
Lab: NORMAL
SARS-COV-2 AG UPPER RESP QL IA.RAPID: NOT DETECTED

## 2024-10-10 PROCEDURE — 99213 OFFICE O/P EST LOW 20 MIN: CPT | Performed by: FAMILY MEDICINE

## 2024-10-10 PROCEDURE — 1159F MED LIST DOCD IN RCRD: CPT | Performed by: FAMILY MEDICINE

## 2024-10-10 PROCEDURE — 1160F RVW MEDS BY RX/DR IN RCRD: CPT | Performed by: FAMILY MEDICINE

## 2024-10-10 PROCEDURE — 87428 SARSCOV & INF VIR A&B AG IA: CPT | Performed by: FAMILY MEDICINE

## 2024-10-10 PROCEDURE — 1126F AMNT PAIN NOTED NONE PRSNT: CPT | Performed by: FAMILY MEDICINE

## 2024-10-10 RX ORDER — BENZONATATE 100 MG/1
100 CAPSULE ORAL 3 TIMES DAILY PRN
Qty: 30 CAPSULE | Refills: 0 | Status: SHIPPED | OUTPATIENT
Start: 2024-10-10

## 2024-10-10 RX ORDER — PREDNISONE 20 MG/1
40 TABLET ORAL DAILY
Qty: 10 TABLET | Refills: 0 | Status: SHIPPED | OUTPATIENT
Start: 2024-10-10 | End: 2024-10-15

## 2024-10-10 RX ORDER — SACCHAROMYCES BOULARDII 250 MG
250 CAPSULE ORAL 2 TIMES DAILY
Qty: 20 CAPSULE | Refills: 0 | Status: SHIPPED | OUTPATIENT
Start: 2024-10-10 | End: 2024-10-20
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
## 2025-01-03 ENCOUNTER — TELEPHONE (OUTPATIENT)
Dept: FAMILY MEDICINE CLINIC | Facility: CLINIC | Age: 50
End: 2025-01-03

## 2025-01-03 DIAGNOSIS — G25.81 RESTLESS LEGS SYNDROME: ICD-10-CM

## 2025-01-03 RX ORDER — ROPINIROLE 0.5 MG/1
0.5 TABLET, FILM COATED ORAL NIGHTLY PRN
Qty: 30 TABLET | Refills: 0 | Status: SHIPPED | OUTPATIENT
Start: 2025-01-03

## 2025-01-03 NOTE — TELEPHONE ENCOUNTER
Caller: Hattie Pringle    Relationship: Self    Best call back number: [phone]    What orders are you requesting (i.e. lab or imaging): ANNUAL MAMMOGRAM     In what timeframe would the patient need to come in: ANY TIME    Where will you receive your lab/imaging services: [facility]

## 2025-01-12 DIAGNOSIS — J45.30 MILD PERSISTENT ASTHMA WITHOUT COMPLICATION: ICD-10-CM

## 2025-01-13 RX ORDER — ALBUTEROL SULFATE 90 UG/1
2 INHALANT RESPIRATORY (INHALATION) EVERY 6 HOURS PRN
Qty: 18 G | Refills: 0 | Status: SHIPPED | OUTPATIENT
Start: 2025-01-13 | End: 2025-01-14 | Stop reason: SDUPTHER

## 2025-01-14 ENCOUNTER — OFFICE VISIT (OUTPATIENT)
Dept: FAMILY MEDICINE CLINIC | Facility: CLINIC | Age: 50
End: 2025-01-14
Payer: MEDICARE

## 2025-01-14 VITALS
WEIGHT: 195 LBS | SYSTOLIC BLOOD PRESSURE: 126 MMHG | HEART RATE: 90 BPM | DIASTOLIC BLOOD PRESSURE: 72 MMHG | BODY MASS INDEX: 33.29 KG/M2 | OXYGEN SATURATION: 96 % | HEIGHT: 64 IN | TEMPERATURE: 97.4 F

## 2025-01-14 DIAGNOSIS — Z12.31 SCREENING MAMMOGRAM FOR BREAST CANCER: ICD-10-CM

## 2025-01-14 DIAGNOSIS — Z00.00 MEDICARE ANNUAL WELLNESS VISIT, SUBSEQUENT: Primary | ICD-10-CM

## 2025-01-14 DIAGNOSIS — G89.29 CHRONIC PAIN OF RIGHT ANKLE: ICD-10-CM

## 2025-01-14 DIAGNOSIS — R73.03 PREDIABETES: ICD-10-CM

## 2025-01-14 DIAGNOSIS — M25.571 CHRONIC PAIN OF RIGHT ANKLE: ICD-10-CM

## 2025-01-14 DIAGNOSIS — E55.9 VITAMIN D DEFICIENCY: ICD-10-CM

## 2025-01-14 DIAGNOSIS — G25.81 RESTLESS LEGS SYNDROME: ICD-10-CM

## 2025-01-14 DIAGNOSIS — E53.8 FOLIC ACID DEFICIENCY: ICD-10-CM

## 2025-01-14 DIAGNOSIS — J44.9 CHRONIC OBSTRUCTIVE PULMONARY DISEASE, UNSPECIFIED COPD TYPE: ICD-10-CM

## 2025-01-14 DIAGNOSIS — E78.2 MIXED HYPERLIPIDEMIA: ICD-10-CM

## 2025-01-14 PROCEDURE — 1160F RVW MEDS BY RX/DR IN RCRD: CPT | Performed by: NURSE PRACTITIONER

## 2025-01-14 PROCEDURE — 99214 OFFICE O/P EST MOD 30 MIN: CPT | Performed by: NURSE PRACTITIONER

## 2025-01-14 PROCEDURE — G0439 PPPS, SUBSEQ VISIT: HCPCS | Performed by: NURSE PRACTITIONER

## 2025-01-14 PROCEDURE — 1126F AMNT PAIN NOTED NONE PRSNT: CPT | Performed by: NURSE PRACTITIONER

## 2025-01-14 PROCEDURE — 1159F MED LIST DOCD IN RCRD: CPT | Performed by: NURSE PRACTITIONER

## 2025-01-14 PROCEDURE — 1170F FXNL STATUS ASSESSED: CPT | Performed by: NURSE PRACTITIONER

## 2025-01-14 RX ORDER — ROPINIROLE 0.5 MG/1
0.5 TABLET, FILM COATED ORAL NIGHTLY PRN
Qty: 90 TABLET | Refills: 1 | Status: SHIPPED | OUTPATIENT
Start: 2025-01-14

## 2025-01-14 RX ORDER — FOLIC ACID 1 MG/1
1 TABLET ORAL DAILY
Qty: 90 TABLET | Refills: 1 | Status: SHIPPED | OUTPATIENT
Start: 2025-01-14

## 2025-01-14 RX ORDER — ALBUTEROL SULFATE 90 UG/1
2 INHALANT RESPIRATORY (INHALATION) EVERY 6 HOURS PRN
Qty: 18 G | Refills: 5 | Status: SHIPPED | OUTPATIENT
Start: 2025-01-14

## 2025-01-14 RX ORDER — DICLOFENAC SODIUM 75 MG/1
75 TABLET, DELAYED RELEASE ORAL 2 TIMES DAILY
Qty: 180 TABLET | Refills: 1 | Status: SHIPPED | OUTPATIENT
Start: 2025-01-14

## 2025-01-14 NOTE — PROGRESS NOTES
Medicare wellness and med refills and will need labs   The ABCs of the Annual Wellness Visit  Medicare Wellness Visit      Hattie Pringle is a 49 y.o. patient who presents for a Medicare Wellness Visit.    The following portions of the patient's history were reviewed and   updated as appropriate: allergies, current medications, past family history, past medical history, past social history, past surgical history, and problem list.    Compared to one year ago, the patient's physical   health is the same.  Compared to one year ago, the patient's mental   health is better.    Recent Hospitalizations:  She was not admitted to the hospital during the last year.     Current Medical Providers:  Patient Care Team:  Larissa Kevin APRN as PCP - General (Nurse Practitioner)  Thomas Woodson MD as Consulting Physician (Pulmonary Disease)  Belen Puente MD as Consulting Physician (General Surgery)  Cade Martin MD as Surgeon (Bariatrics)    Outpatient Medications Prior to Visit   Medication Sig Dispense Refill    cyclobenzaprine (FLEXERIL) 10 MG tablet Take 1 tablet by mouth 3 (Three) Times a Day As Needed for Muscle Spasms. 15 tablet 0    albuterol sulfate  (90 Base) MCG/ACT inhaler INHALE TWO PUFFS BY MOUTH EVERY 6 HOURS AS NEEDED FOR WHEEZING OR SHORTNESS OF AIR 18 g 0    diclofenac (VOLTAREN) 75 MG EC tablet Take 1 tablet by mouth 2 (Two) Times a Day.      folic acid (FOLVITE) 1 MG tablet Take 1 tablet by mouth Daily. 90 tablet 3    rOPINIRole (REQUIP) 0.5 MG tablet Take 1 tablet by mouth At Night As Needed (RLS). Take 1 hour before bedtime. 30 tablet 0    benzonatate (Tessalon Perles) 100 MG capsule Take 1 capsule by mouth 3 (Three) Times a Day As Needed for Cough. (Patient not taking: Reported on 1/14/2025) 30 capsule 0    Cyanocobalamin (Vitamin B 12) 500 MCG tablet Take 1 tablet by mouth Daily. (Patient not taking: Reported on 1/14/2025)      ondansetron (ZOFRAN) 4 MG tablet  (Patient not  "taking: Reported on 1/14/2025)      oxyCODONE-acetaminophen (PERCOCET) 5-325 MG per tablet Take 1 tablet by mouth Every 6 (Six) Hours As Needed (Pain). (Patient not taking: Reported on 1/14/2025) 20 tablet 0     No facility-administered medications prior to visit.     No opioid medication identified on active medication list. I have reviewed chart for other potential  high risk medication/s and harmful drug interactions in the elderly.      Aspirin is not on active medication list.  Aspirin use is not indicated based on review of current medical condition/s. Risk of harm outweighs potential benefits.  .    Patient Active Problem List   Diagnosis    Clinical diagnosis of COVID-19    GERD (gastroesophageal reflux disease)    Chronic GERD    Arthritis    Diverticulosis    Hiatal hernia    Morbid obesity    Restless legs syndrome    Pulmonary nodule    Anemia    Asthma    Prediabetes    Folic acid deficiency    S/P laparoscopic sleeve gastrectomy    Morbid obesity due to excess calories    Mixed hyperlipidemia    Vitamin D deficiency    Screening for malignant neoplasm of colon    History of colonic polyps    Ventral hernia without obstruction or gangrene     Advance Care Planning Advance Directive is not on file.  ACP discussion was held with the patient during this visit. Patient does not have an advance directive, declines further assistance.            Objective   Vitals:    01/14/25 1105   BP: 126/72   Pulse: 90   Temp: 97.4 °F (36.3 °C)   TempSrc: Temporal   SpO2: 96%   Weight: 88.5 kg (195 lb)   Height: 162.6 cm (64\")   PainSc: 0-No pain       Estimated body mass index is 33.47 kg/m² as calculated from the following:    Height as of this encounter: 162.6 cm (64\").    Weight as of this encounter: 88.5 kg (195 lb).                Does the patient have evidence of cognitive impairment? No                                                                                                Health  Risk Assessment    Smoking " Status:  Social History     Tobacco Use   Smoking Status Former    Current packs/day: 0.00    Average packs/day: 0.3 packs/day for 20.0 years (5.0 ttl pk-yrs)    Types: Cigarettes    Start date:     Quit date:     Years since quittin.0    Passive exposure: Past   Smokeless Tobacco Never   Tobacco Comments    one pack per week     Alcohol Consumption:  Social History     Substance and Sexual Activity   Alcohol Use Not Currently    Alcohol/week: 1.0 standard drink of alcohol    Types: 1 Glasses of wine per week    Comment: Just on        Fall Risk Screen  STEADI Fall Risk Assessment was completed, and patient is at LOW risk for falls.Assessment completed on:2025    Depression Screening   Little interest or pleasure in doing things? Not at all   Feeling down, depressed, or hopeless? Not at all   PHQ-2 Total Score 0      Health Habits and Functional and Cognitive Screenin/14/2025    11:08 AM   Functional & Cognitive Status   Do you have difficulty preparing food and eating? No   Do you have difficulty bathing yourself, getting dressed or grooming yourself? No   Do you have difficulty using the toilet? No   Do you have difficulty moving around from place to place? No   Do you have trouble with steps or getting out of a bed or a chair? No   Current Diet Unhealthy Diet   Dental Exam Not up to date   Eye Exam Up to date   Exercise (times per week) 7 times per week   Current Exercises Include Other;House Cleaning;Yard Work;Walking   Do you need help using the phone?  No   Are you deaf or do you have serious difficulty hearing?  No   Do you need help to go to places out of walking distance? No   Do you need help shopping? No   Do you need help preparing meals?  No   Do you need help with housework?  No   Do you need help with laundry? No   Do you need help taking your medications? No   Do you need help managing money? No   Do you ever drive or ride in a car without wearing a seat belt? No    Have you felt unusual stress, anger or loneliness in the last month? No   Who do you live with? Child   If you need help, do you have trouble finding someone available to you? No   Have you been bothered in the last four weeks by sexual problems? No   Do you have difficulty concentrating, remembering or making decisions? No           Age-appropriate Screening Schedule:  Refer to the list below for future screening recommendations based on patient's age, sex and/or medical conditions. Orders for these recommended tests are listed in the plan section. The patient has been provided with a written plan.    Health Maintenance List  Health Maintenance   Topic Date Due    LIPID PANEL  01/02/2025    COVID-19 Vaccine (7 - 2024-25 season) 01/01/2026 (Originally 9/1/2024)    BMI FOLLOWUP  09/18/2025    ANNUAL WELLNESS VISIT  01/14/2026    MAMMOGRAM  01/22/2026    COLORECTAL CANCER SCREENING  03/23/2028    TDAP/TD VACCINES (2 - Td or Tdap) 02/23/2032    HEPATITIS C SCREENING  Completed    Pneumococcal Vaccine 0-64  Completed    INFLUENZA VACCINE  Completed    HEMOGLOBIN A1C  Discontinued    URINE MICROALBUMIN  Discontinued                                                                                                                                                CMS Preventative Services Quick Reference  Risk Factors Identified During Encounter  Immunizations Discussed/Encouraged: COVID19    The above risks/problems have been discussed with the patient.  Pertinent information has been shared with the patient in the After Visit Summary.  An After Visit Summary and PPPS were made available to the patient.    Follow Up:   Next Medicare Wellness visit to be scheduled in 1 year.         Additional E&M Note during same encounter follows:  Patient has additional, significant, and separately identifiable condition(s)/problem(s) that require work above and beyond the Medicare Wellness Visit     Chief Complaint  Medicare  Wellness-subsequent and Asthma (Medication refills. )    Subjective    Patient is here today for her annual Medicare wellness, subsequent visit    Also medication refills for the chronic comorbid conditions managed from the primary care standpoint regarding the folic acid deficiency vitamin D deficiency asthma and restless leg syndrome    She is also due for fasting labs as well on her dyslipidemia folic acid deficiency vitamin D deficiency and prediabetes        Hattie is also being seen today for an annual adult preventative physical exam.  and Hattie is also being seen today for additional medical problem/s.       The patient is a 49-year-old female who presents for evaluation of chronic right ankle pain, asthma, restless legs syndrome, prediabetes, vitamin D, B12, and folic acid deficiencies, COPD, and health maintenance.    She has been managing her chronic right ankle pain with diclofenac 75 mg twice daily and Flexeril as needed. She is currently wearing a brace and is under the care of an orthopedic specialist at [facility]. An x-ray conducted last year revealed significant deterioration of her right ankle, necessitating a future fusion procedure. She experiences a limp, particularly after prolonged periods of standing or walking, such as her recent 2-hour visit to [employer]. She reports no fatigue or tiredness.    She requires a refill of her albuterol inhaler for COPD management. She reports increased usage during cold weather but has not experienced any shortness of breath or chest pain and no real exacerbation. She has been diagnosed with moderate COPD by Dr. Woodson.       She continues to take Requip for restless leg syndrome, which she reports as effective and overall stable no side effects no issues.    She is on a daily regimen of folic acid. She has a history of prediabetes, vitamin D, B12, and folic acid deficiencies. She has not fasted prior to this visit. She reports no symptoms of excessive  thirst, hunger, or urination. She reports no abdominal pain, blood in stool, trouble swallowing, visual disturbances, double vision, dizziness, lightheadedness, seizures, fainting, bleeding or bruising easily, painful urination, vaginal bleeding, or suicidal ideation or self-injury.    She underwent bariatric surgery last year and continues to follow up annually with Dr. Cade Martin at [facility]. She had a colonoscopy on 03/23/2023 and is scheduled for a follow-up in 5 years. She is awaiting confirmation of her mammogram appointment--actually needs this ordered. She reports no breast lumps, nipple discharge, or breast pain. She has received 3 doses of the Moderna COVID-19 vaccine without any side effects and prefers to continue with Moderna for future vaccinations. She does not have an advanced directive or living will and does not wish to receive information about it today. She reports an improvement in her physical and mental health compared to the previous year.    Supplemental Information  She had a ventral hernia repair surgery since her last visit. The recovery was painful, and she had to sleep in a recliner for a week. The mesh used was bigger than expected because the hernia was larger than anticipated. She was discharged the same day post surgery. She has not consulted urology since her bladder mesh procedure.    MEDICATIONS  Current: Diclofenac, Flexeril, albuterol, Requip, folic acid.    IMMUNIZATIONS  She has had 3 doses of the Moderna COVID-19 vaccine.  Review of Systems   Constitutional:  Negative for fatigue.   HENT:  Negative for trouble swallowing.    Eyes:  Negative for visual disturbance.   Respiratory:  Negative for shortness of breath.    Cardiovascular:  Negative for chest pain.   Gastrointestinal:  Negative for abdominal pain and blood in stool.   Endocrine: Negative for polydipsia, polyphagia and polyuria.   Genitourinary:  Negative for dysuria and vaginal bleeding.  "  Musculoskeletal:  Positive for arthralgias and gait problem.   Neurological:  Negative for dizziness, seizures, syncope and light-headedness.   Hematological:  Does not bruise/bleed easily.   Psychiatric/Behavioral: Negative.  Negative for self-injury and suicidal ideas.           Objective   Vital Signs:  /72   Pulse 90   Temp 97.4 °F (36.3 °C) (Temporal)   Ht 162.6 cm (64\")   Wt 88.5 kg (195 lb)   SpO2 96%   BMI 33.47 kg/m²   Physical Exam  Vitals and nursing note reviewed.   Constitutional:       Appearance: Normal appearance.   HENT:      Head: Normocephalic.      Right Ear: External ear normal.      Left Ear: External ear normal.      Nose: Nose normal.      Mouth/Throat:      Mouth: Mucous membranes are moist.   Eyes:      Pupils: Pupils are equal, round, and reactive to light.   Cardiovascular:      Rate and Rhythm: Normal rate and regular rhythm.      Heart sounds: Normal heart sounds.   Pulmonary:      Effort: Pulmonary effort is normal.      Breath sounds: Normal breath sounds.   Abdominal:      General: Bowel sounds are normal.      Palpations: Abdomen is soft.      Tenderness: There is no abdominal tenderness.   Musculoskeletal:         General: Tenderness present.      Cervical back: Normal range of motion and neck supple.      Right ankle: Tenderness present. Decreased range of motion.        Legs:    Skin:     General: Skin is warm and dry.   Neurological:      Mental Status: She is alert and oriented to person, place, and time.   Psychiatric:         Mood and Affect: Mood normal.         Behavior: Behavior normal.         Thought Content: Thought content normal.         Judgment: Judgment normal.           Thyroid was examined.  Lungs were auscultated.  No pain on palpation of the epigastric area.  Pain with weightbearing and range of motion in the right ankle.    Vital Signs  Blood pressure and weight are normal.    The following data was reviewed by: PHILLIP Haider on " 01/14/2025:        Results  Office Visit with Belen Puente MD (09/18/2024)       Imaging  X-ray of right ankle showed complete degeneration.              Assessment and Plan Additional age appropriate preventative wellness advice topics were discussed during today's preventative wellness exam(some topics already addressed during AWV portion of the note above):    Physical Activity: Advised cardiovascular activity 150 minutes per week as tolerated. (example brisk walk for 30 minutes, 5 days a week).     Nutrition: Discussed nutrition plan with patient. Information shared in after visit summary. Goal is for a well balanced diet to enhance overall health.     Healthy Weight: Discussed current and goal BMI with patient. Steps to attain this goal discussed. Information shared in after visit summary.       1.  Medicare annual wellness, subsequent    2. Chronic right ankle pain.  She reports chronic pain in her right ankle, which has been confirmed by an x-ray to be severely damaged. She is currently taking diclofenac 75 mg twice a day and Flexeril as needed for pain management. A refill for diclofenac will be sent to [facility] in [facility]. She is advised to continue using her brace and to consider scheduling the ankle fusion surgery when ready.    3. COPD.   She reports increased use of her albuterol inhaler during cold weather but no recent exacerbations, shortness of breath, or chest pain. A refill for albuterol will be sent to [facility].    4. Restless leg syndrome.  She reports that Requip is still working effectively for her restless leg syndrome. No changes to her current treatment plan are necessary.    5. Prediabetes.  She has a history of prediabetes. Fasting labs will be ordered to monitor her condition, including cholesterol levels. She is advised to return for fasting labs at her earliest convenience.    6. Vitamin D, B12, and folic acid deficiencies.  She has a history of vitamin D, B12,  and folic acid deficiencies. Labs will be ordered to monitor these levels. She is currently taking folic acid daily and should continue this regimen.    7. Health maintenance.  She is due for a screening mammogram and has had a colonoscopy in 2023, with the next one due in 2028. Blood pressure and weight are normal. A screening mammogram will be ordered. She is advised to schedule her next colonoscopy in 2028.  And patient will stop back by fasting for her labs    Follow-up  The patient will follow up in 6 months.    PROCEDURE  The patient underwent a ventral hernia repair surgery since her last visit. She also had a bladder mesh procedure in the past.            Follow Up   Return in about 6 months (around 7/14/2025), or if symptoms worsen or fail to improve, for Recheck.  Patient was given instructions and counseling regarding her condition or for health maintenance advice. Please see specific information pulled into the AVS if appropriate.  Patient or patient representative verbalized consent for the use of Ambient Listening during the visit with  PHILLIP Haider for chart documentation. 1/14/2025  11:25 EST

## 2025-01-30 ENCOUNTER — CLINICAL SUPPORT (OUTPATIENT)
Dept: FAMILY MEDICINE CLINIC | Facility: CLINIC | Age: 50
End: 2025-01-30

## 2025-01-30 DIAGNOSIS — E78.2 MIXED HYPERLIPIDEMIA: ICD-10-CM

## 2025-01-30 DIAGNOSIS — D64.9 ANEMIA, UNSPECIFIED TYPE: Primary | ICD-10-CM

## 2025-01-30 DIAGNOSIS — R73.03 PREDIABETES: ICD-10-CM

## 2025-01-30 LAB
25(OH)D3 SERPL-MCNC: 27.1 NG/ML (ref 30–100)
ALBUMIN SERPL-MCNC: 3.9 G/DL (ref 3.5–5.2)
ALBUMIN/GLOB SERPL: 1.6 G/DL
ALP SERPL-CCNC: 62 U/L (ref 39–117)
ALT SERPL W P-5'-P-CCNC: 13 U/L (ref 1–33)
ANION GAP SERPL CALCULATED.3IONS-SCNC: 10.2 MMOL/L (ref 5–15)
AST SERPL-CCNC: 13 U/L (ref 1–32)
BACTERIA UR QL AUTO: ABNORMAL /HPF
BASOPHILS # BLD AUTO: 0.02 10*3/MM3 (ref 0–0.2)
BASOPHILS NFR BLD AUTO: 0.3 % (ref 0–1.5)
BILIRUB SERPL-MCNC: 0.4 MG/DL (ref 0–1.2)
BILIRUB UR QL STRIP: NEGATIVE
BUN SERPL-MCNC: 14 MG/DL (ref 6–20)
BUN/CREAT SERPL: 15.9 (ref 7–25)
CALCIUM SPEC-SCNC: 8.9 MG/DL (ref 8.6–10.5)
CHLORIDE SERPL-SCNC: 108 MMOL/L (ref 98–107)
CHOLEST SERPL-MCNC: 146 MG/DL (ref 0–200)
CLARITY UR: ABNORMAL
CO2 SERPL-SCNC: 24.8 MMOL/L (ref 22–29)
COLOR UR: ABNORMAL
CREAT SERPL-MCNC: 0.88 MG/DL (ref 0.57–1)
DEPRECATED RDW RBC AUTO: 41.8 FL (ref 37–54)
EGFRCR SERPLBLD CKD-EPI 2021: 81.7 ML/MIN/1.73
EOSINOPHIL # BLD AUTO: 0.4 10*3/MM3 (ref 0–0.4)
EOSINOPHIL NFR BLD AUTO: 5.6 % (ref 0.3–6.2)
ERYTHROCYTE [DISTWIDTH] IN BLOOD BY AUTOMATED COUNT: 12.7 % (ref 12.3–15.4)
FOLATE SERPL-MCNC: 3.44 NG/ML (ref 4.78–24.2)
GLOBULIN UR ELPH-MCNC: 2.5 GM/DL
GLUCOSE SERPL-MCNC: 92 MG/DL (ref 65–99)
GLUCOSE UR STRIP-MCNC: NEGATIVE MG/DL
HBA1C MFR BLD: 5.4 % (ref 4.8–5.6)
HCT VFR BLD AUTO: 41.8 % (ref 34–46.6)
HDLC SERPL-MCNC: 54 MG/DL (ref 40–60)
HGB BLD-MCNC: 14.1 G/DL (ref 12–15.9)
HGB UR QL STRIP.AUTO: ABNORMAL
HOLD SPECIMEN: NORMAL
HYALINE CASTS UR QL AUTO: ABNORMAL /LPF
IMM GRANULOCYTES # BLD AUTO: 0.04 10*3/MM3 (ref 0–0.05)
IMM GRANULOCYTES NFR BLD AUTO: 0.6 % (ref 0–0.5)
KETONES UR QL STRIP: ABNORMAL
LDLC SERPL CALC-MCNC: 77 MG/DL (ref 0–100)
LDLC/HDLC SERPL: 1.42 {RATIO}
LEUKOCYTE ESTERASE UR QL STRIP.AUTO: ABNORMAL
LYMPHOCYTES # BLD AUTO: 1.98 10*3/MM3 (ref 0.7–3.1)
LYMPHOCYTES NFR BLD AUTO: 27.8 % (ref 19.6–45.3)
MCH RBC QN AUTO: 30.5 PG (ref 26.6–33)
MCHC RBC AUTO-ENTMCNC: 33.7 G/DL (ref 31.5–35.7)
MCV RBC AUTO: 90.3 FL (ref 79–97)
MONOCYTES # BLD AUTO: 0.46 10*3/MM3 (ref 0.1–0.9)
MONOCYTES NFR BLD AUTO: 6.5 % (ref 5–12)
NEUTROPHILS NFR BLD AUTO: 4.21 10*3/MM3 (ref 1.7–7)
NEUTROPHILS NFR BLD AUTO: 59.2 % (ref 42.7–76)
NITRITE UR QL STRIP: NEGATIVE
NRBC BLD AUTO-RTO: 0 /100 WBC (ref 0–0.2)
PH UR STRIP.AUTO: 5.5 [PH] (ref 5–8)
PLATELET # BLD AUTO: 336 10*3/MM3 (ref 140–450)
PMV BLD AUTO: 8 FL (ref 6–12)
POTASSIUM SERPL-SCNC: 3.7 MMOL/L (ref 3.5–5.2)
PROT SERPL-MCNC: 6.4 G/DL (ref 6–8.5)
PROT UR QL STRIP: ABNORMAL
RBC # BLD AUTO: 4.63 10*6/MM3 (ref 3.77–5.28)
RBC # UR STRIP: ABNORMAL /HPF
REF LAB TEST METHOD: ABNORMAL
SODIUM SERPL-SCNC: 143 MMOL/L (ref 136–145)
SP GR UR STRIP: 1.03 (ref 1–1.03)
SQUAMOUS #/AREA URNS HPF: ABNORMAL /HPF
TRIGL SERPL-MCNC: 77 MG/DL (ref 0–150)
TSH SERPL DL<=0.05 MIU/L-ACNC: 1.34 UIU/ML (ref 0.27–4.2)
UROBILINOGEN UR QL STRIP: ABNORMAL
VIT B12 BLD-MCNC: 310 PG/ML (ref 211–946)
VLDLC SERPL-MCNC: 15 MG/DL (ref 5–40)
WBC # UR STRIP: ABNORMAL /HPF
WBC NRBC COR # BLD AUTO: 7.11 10*3/MM3 (ref 3.4–10.8)

## 2025-01-30 PROCEDURE — 80053 COMPREHEN METABOLIC PANEL: CPT | Performed by: NURSE PRACTITIONER

## 2025-01-30 PROCEDURE — 87086 URINE CULTURE/COLONY COUNT: CPT | Performed by: NURSE PRACTITIONER

## 2025-01-30 PROCEDURE — 82607 VITAMIN B-12: CPT | Performed by: NURSE PRACTITIONER

## 2025-01-30 PROCEDURE — 82306 VITAMIN D 25 HYDROXY: CPT | Performed by: NURSE PRACTITIONER

## 2025-01-30 PROCEDURE — 84443 ASSAY THYROID STIM HORMONE: CPT | Performed by: NURSE PRACTITIONER

## 2025-01-30 PROCEDURE — 36415 COLL VENOUS BLD VENIPUNCTURE: CPT | Performed by: NURSE PRACTITIONER

## 2025-01-30 PROCEDURE — 82746 ASSAY OF FOLIC ACID SERUM: CPT | Performed by: NURSE PRACTITIONER

## 2025-01-30 PROCEDURE — 85025 COMPLETE CBC W/AUTO DIFF WBC: CPT | Performed by: NURSE PRACTITIONER

## 2025-01-30 PROCEDURE — 83036 HEMOGLOBIN GLYCOSYLATED A1C: CPT | Performed by: NURSE PRACTITIONER

## 2025-01-30 PROCEDURE — 80061 LIPID PANEL: CPT | Performed by: NURSE PRACTITIONER

## 2025-01-30 PROCEDURE — 81001 URINALYSIS AUTO W/SCOPE: CPT | Performed by: NURSE PRACTITIONER

## 2025-01-30 NOTE — PROGRESS NOTES
Venipuncture Blood Specimen Collection  Venipuncture performed in left arm by Connie Montes De Oca with good hemostasis. Patient tolerated the procedure well without complications.   01/30/25   Connie Montes De Oca

## 2025-02-01 LAB — BACTERIA SPEC AEROBE CULT: NO GROWTH

## 2025-02-04 ENCOUNTER — OFFICE VISIT (OUTPATIENT)
Dept: FAMILY MEDICINE CLINIC | Facility: CLINIC | Age: 50
End: 2025-02-04
Payer: MEDICARE

## 2025-02-04 VITALS
OXYGEN SATURATION: 98 % | SYSTOLIC BLOOD PRESSURE: 126 MMHG | BODY MASS INDEX: 33.12 KG/M2 | HEART RATE: 102 BPM | WEIGHT: 194 LBS | HEIGHT: 64 IN | DIASTOLIC BLOOD PRESSURE: 72 MMHG | TEMPERATURE: 97.3 F

## 2025-02-04 DIAGNOSIS — R05.1 ACUTE COUGH: ICD-10-CM

## 2025-02-04 DIAGNOSIS — J02.9 SORE THROAT: Primary | ICD-10-CM

## 2025-02-04 DIAGNOSIS — R49.0 HOARSENESS: ICD-10-CM

## 2025-02-04 DIAGNOSIS — R51.9 NONINTRACTABLE HEADACHE, UNSPECIFIED CHRONICITY PATTERN, UNSPECIFIED HEADACHE TYPE: ICD-10-CM

## 2025-02-04 DIAGNOSIS — J04.0 LARYNGITIS: ICD-10-CM

## 2025-02-04 DIAGNOSIS — J44.9 CHRONIC OBSTRUCTIVE PULMONARY DISEASE, UNSPECIFIED COPD TYPE: ICD-10-CM

## 2025-02-04 PROCEDURE — 1125F AMNT PAIN NOTED PAIN PRSNT: CPT | Performed by: NURSE PRACTITIONER

## 2025-02-04 PROCEDURE — 87880 STREP A ASSAY W/OPTIC: CPT | Performed by: NURSE PRACTITIONER

## 2025-02-04 PROCEDURE — 99213 OFFICE O/P EST LOW 20 MIN: CPT | Performed by: NURSE PRACTITIONER

## 2025-02-04 PROCEDURE — 87428 SARSCOV & INF VIR A&B AG IA: CPT | Performed by: NURSE PRACTITIONER

## 2025-02-04 RX ORDER — BENZONATATE 100 MG/1
100 CAPSULE ORAL 3 TIMES DAILY PRN
Qty: 30 CAPSULE | Refills: 0 | Status: SHIPPED | OUTPATIENT
Start: 2025-02-04

## 2025-02-04 RX ORDER — ALBUTEROL SULFATE 1.25 MG/3ML
1 SOLUTION RESPIRATORY (INHALATION) EVERY 6 HOURS PRN
Qty: 90 ML | Refills: 1 | Status: SHIPPED | OUTPATIENT
Start: 2025-02-04

## 2025-02-04 RX ORDER — PREDNISONE 5 MG/1
TABLET ORAL
Qty: 1 EACH | Refills: 0 | Status: SHIPPED | OUTPATIENT
Start: 2025-02-04

## 2025-02-04 NOTE — PROGRESS NOTES
Chief Complaint  Cough, Hoarse, and Headache (Bad headache, cough, congestion, hoarse and all this started 2 days a go. )    The PHQ has not been completed during this encounter.       Cough  Associated symptoms include headaches.   Hoarse  Symptoms include cough and headaches.    Headache    Hattie Pringle is a 49 y.o. female who presents to [facility] with a past medical history of  Past Medical History:   Diagnosis Date    Acid reflux disease     Anemia     Asthma     Chronic GERD     Degenerative arthritis of ankle     Diverticulitis     Emotional depression     Hiatal hernia     Limited joint range of motion     MRSA (methicillin resistant Staphylococcus aureus)     BLADDER -- NO CURRENT ISSUES    Nerve damage     PONV (postoperative nausea and vomiting)     Restless leg syndrome     Ventral hernia        HPI     The patient is a 49-year-old female who presents for evaluation of upper respiratory symptoms.    She reports experiencing a severe headache yesterday, localized at the back of her neck. She has been experiencing voice loss, which she attributes to postnasal drainage. She does not report any fever, chills, body aches, nausea, vomiting, or diarrhea. She also does not report any recent exposure to sick individuals. She does not have a sore throat but has been producing clear sputum with her cough. She does not report any wheezing. She is not diabetic and has no known issues with steroid use. She has not been using salt water gargles, honey, or cough drops as remedies. She initiated the use of cough pills yesterday. She is uncertain about the availability of Tessalon Perles at home. She inquires if she needs to use the inhaler with the steroids.    She has a history of COPD and uses an albuterol nebulizer as needed, particularly when in environments with dogs. She is requesting a refill of her albuterol prescription.    MEDICATIONS  Current: Tessalon perles, albuterol    "    Objective   Vital Signs:   Vitals:    02/04/25 0947   BP: 126/72   Pulse: 102   Temp: 97.3 °F (36.3 °C)   TempSrc: Temporal   SpO2: 98%   Weight: 88 kg (194 lb)   Height: 162.6 cm (64\")   PainSc:   4     Body mass index is 33.3 kg/m².    Wt Readings from Last 3 Encounters:   02/04/25 88 kg (194 lb)   01/14/25 88.5 kg (195 lb)   10/10/24 88 kg (194 lb)     BP Readings from Last 3 Encounters:   02/04/25 126/72   01/14/25 126/72   10/10/24 130/70       Health Maintenance   Topic Date Due    COVID-19 Vaccine (7 - 2024-25 season) 01/01/2026 (Originally 9/1/2024)    BMI FOLLOWUP  09/18/2025    ANNUAL WELLNESS VISIT  01/14/2026    MAMMOGRAM  01/22/2026    LIPID PANEL  01/30/2026    COLORECTAL CANCER SCREENING  03/23/2028    TDAP/TD VACCINES (2 - Td or Tdap) 02/23/2032    HEPATITIS C SCREENING  Completed    Pneumococcal Vaccine 0-64  Completed    INFLUENZA VACCINE  Completed    HEMOGLOBIN A1C  Discontinued    URINE MICROALBUMIN  Discontinued       Physical Exam  Vitals reviewed.   Constitutional:       General: She is not in acute distress.     Appearance: Normal appearance. She is well-developed.   HENT:      Head: Normocephalic and atraumatic.      Right Ear: Tympanic membrane, ear canal and external ear normal.      Left Ear: Tympanic membrane, ear canal and external ear normal.      Nose: Congestion present.      Mouth/Throat:      Mouth: Mucous membranes are moist.      Comments: Loss of voice  Eyes:      Conjunctiva/sclera: Conjunctivae normal.      Pupils: Pupils are equal, round, and reactive to light.   Cardiovascular:      Rate and Rhythm: Normal rate and regular rhythm.      Heart sounds: Normal heart sounds.   Pulmonary:      Effort: Pulmonary effort is normal.      Breath sounds: Normal breath sounds. No wheezing or rhonchi.   Musculoskeletal:      Cervical back: Neck supple.      Right lower leg: No edema.      Left lower leg: No edema.   Skin:     General: Skin is warm and dry.   Neurological:      Mental " Status: She is alert and oriented to person, place, and time.   Psychiatric:         Mood and Affect: Mood and affect normal.         Behavior: Behavior normal.         Thought Content: Thought content normal.         Judgment: Judgment normal.            Result Review :  The following data was reviewed by: PHILLIP Jenkins on 02/04/2025:  Results  Laboratory Studies  COVID-19, influenza, and strep tests were all negative.     SARS Antigen   Date Value Ref Range Status   02/04/2025 Not Detected Not Detected, Presumptive Negative Final     Influenza A Antigen GURU   Date Value Ref Range Status   02/04/2025 Not Detected Not Detected Final     Influenza B Antigen GURU   Date Value Ref Range Status   02/04/2025 Not Detected Not Detected Final     Internal Control   Date Value Ref Range Status   02/04/2025 Passed Passed Final     Lot Number   Date Value Ref Range Status   02/04/2025 709,[id number]  Final     Expiration Date   Date Value Ref Range Status   02/04/2025 07-  Final     Strep          2/4/2025    10:03   Common Labs   POC Strep A, Molecular Negative        Procedures        Assessment and Plan   Diagnoses and all orders for this visit:    1. Sore throat (Primary)  -     POCT rapid strep A  -     POCT SARS-CoV-2 Antigen GURU + Flu    2. Acute cough  -     POCT rapid strep A  -     POCT SARS-CoV-2 Antigen GURU + Flu  -     benzonatate (Tessalon Perles) 100 MG capsule; Take 1 capsule by mouth 3 (Three) Times a Day As Needed for Cough.  Dispense: 30 capsule; Refill: 0    3. Nonintractable headache, unspecified chronicity pattern, unspecified headache type  -     POCT rapid strep A  -     POCT SARS-CoV-2 Antigen GURU + Flu    4. Hoarseness  -     POCT rapid strep A  -     POCT SARS-CoV-2 Antigen GURU + Flu    5. Laryngitis  -     predniSONE 5 MG (21) tablet therapy pack dose pack; Take as directed on package instructions.  Dispense: 1 each; Refill: 0    6. Chronic obstructive pulmonary disease, unspecified COPD  type  -     albuterol (ACCUNEB) 1.25 MG/3ML nebulizer solution; Take 3 mL by nebulization Every 6 (Six) Hours As Needed for Shortness of Air.  Dispense: 90 mL; Refill: 1         1. Common cold.  Her symptoms, including loss of voice and headache, are consistent with a common cold. There is no evidence of wheezing or crackles, which would be concerning for pneumonia. COVID-19, influenza, and strep tests were all negative. She is advised to maintain adequate hydration and consider using throat lozenges, cough drops, or honey to soothe her throat. She is also advised to continue using her inhaler if needed. A prescription for Tessalon Perles will be provided to manage her cough. Additionally, a steroid regimen will be initiated to alleviate her laryngitis and potentially reduce any inflammation contributing to her congestion.    2. Chronic obstructive pulmonary disease (COPD).  A prescription for albuterol nebulizer solution will be sent to [facility] in [place]. She is advised to use it as needed, especially in environments with potential triggers like dogs.               FOLLOW UP  Return if symptoms worsen or fail to improve.    Patient was given instructions and counseling regarding her condition or for health maintenance advice. Please see specific information pulled into the AVS if appropriate.       Helen Ni, APRN  02/04/25  10:35 EST    CURRENT & DISCONTINUED MEDICATIONS  Current Outpatient Medications   Medication Instructions    albuterol (ACCUNEB) 1.25 mg, Nebulization, Every 6 Hours PRN    albuterol sulfate  (90 Base) MCG/ACT inhaler 2 puffs, Inhalation, Every 6 Hours PRN    benzonatate (TESSALON PERLES) 100 mg, Oral, 3 Times Daily PRN    cyclobenzaprine (FLEXERIL) 10 mg, Oral, 3 Times Daily PRN    diclofenac (VOLTAREN) 75 mg, Oral, 2 Times Daily    folic acid (FOLVITE) 1 mg, Oral, Daily    predniSONE 5 MG (21) tablet therapy pack dose pack Take as directed on package instructions.     rOPINIRole (REQUIP) 0.5 mg, Oral, Nightly PRN, Take 1 hour before bedtime.       There are no discontinued medications.     Patient or patient representative verbalized consent for the use of Ambient Listening during the visit with  PHILLIP Jenkins for chart documentation. 2/4/2025  10:27 EST

## 2025-02-25 ENCOUNTER — OFFICE VISIT (OUTPATIENT)
Dept: FAMILY MEDICINE CLINIC | Facility: CLINIC | Age: 50
End: 2025-02-25
Payer: MEDICARE

## 2025-02-25 VITALS
DIASTOLIC BLOOD PRESSURE: 74 MMHG | BODY MASS INDEX: 33.46 KG/M2 | HEIGHT: 64 IN | WEIGHT: 196 LBS | SYSTOLIC BLOOD PRESSURE: 118 MMHG | HEART RATE: 101 BPM | TEMPERATURE: 97.5 F | OXYGEN SATURATION: 98 %

## 2025-02-25 DIAGNOSIS — B34.9 VIRAL ILLNESS: Primary | ICD-10-CM

## 2025-02-25 DIAGNOSIS — R51.9 NONINTRACTABLE HEADACHE, UNSPECIFIED CHRONICITY PATTERN, UNSPECIFIED HEADACHE TYPE: ICD-10-CM

## 2025-02-25 DIAGNOSIS — R11.0 NAUSEA: ICD-10-CM

## 2025-02-25 DIAGNOSIS — R68.83 CHILLS: ICD-10-CM

## 2025-02-25 DIAGNOSIS — R19.7 DIARRHEA, UNSPECIFIED TYPE: ICD-10-CM

## 2025-02-25 LAB
027 TOXIN: NORMAL
C DIFF TOX GENS STL QL NAA+PROBE: NEGATIVE
EXPIRATION DATE: NORMAL
EXPIRATION DATE: NORMAL
FLUAV AG UPPER RESP QL IA.RAPID: NOT DETECTED
FLUBV AG UPPER RESP QL IA.RAPID: NOT DETECTED
INTERNAL CONTROL: NORMAL
INTERNAL CONTROL: NORMAL
Lab: NORMAL
Lab: NORMAL
S PYO AG THROAT QL: NEGATIVE
SARS-COV-2 AG UPPER RESP QL IA.RAPID: NOT DETECTED

## 2025-02-25 PROCEDURE — 1160F RVW MEDS BY RX/DR IN RCRD: CPT

## 2025-02-25 PROCEDURE — 87493 C DIFF AMPLIFIED PROBE: CPT

## 2025-02-25 PROCEDURE — 87428 SARSCOV & INF VIR A&B AG IA: CPT

## 2025-02-25 PROCEDURE — 96372 THER/PROPH/DIAG INJ SC/IM: CPT

## 2025-02-25 PROCEDURE — 1159F MED LIST DOCD IN RCRD: CPT

## 2025-02-25 PROCEDURE — 87880 STREP A ASSAY W/OPTIC: CPT

## 2025-02-25 PROCEDURE — 87506 IADNA-DNA/RNA PROBE TQ 6-11: CPT

## 2025-02-25 PROCEDURE — 1125F AMNT PAIN NOTED PAIN PRSNT: CPT

## 2025-02-25 PROCEDURE — 99213 OFFICE O/P EST LOW 20 MIN: CPT

## 2025-02-25 RX ORDER — KETOROLAC TROMETHAMINE 30 MG/ML
60 INJECTION, SOLUTION INTRAMUSCULAR; INTRAVENOUS ONCE
Status: COMPLETED | OUTPATIENT
Start: 2025-02-25 | End: 2025-02-25

## 2025-02-25 RX ORDER — ONDANSETRON 8 MG/1
8 TABLET, ORALLY DISINTEGRATING ORAL EVERY 8 HOURS PRN
Qty: 20 TABLET | Refills: 0 | Status: SHIPPED | OUTPATIENT
Start: 2025-02-25 | End: 2025-02-27

## 2025-02-25 RX ADMIN — KETOROLAC TROMETHAMINE 60 MG: 30 INJECTION, SOLUTION INTRAMUSCULAR; INTRAVENOUS at 12:25

## 2025-02-25 NOTE — PROGRESS NOTES
"Chief Complaint  Diarrhea (Started today), Headache, and Chills    The PHQ has not been completed during this encounter.         History of Present Illness:  Hattie Pringle is a 49 y.o. female who presents to [facility] with a past medical history of  Past Medical History:   Diagnosis Date    Acid reflux disease     Anemia     Asthma     Chronic GERD     Degenerative arthritis of ankle     Diverticulitis     Emotional depression     Hiatal hernia     Limited joint range of motion     MRSA (methicillin resistant Staphylococcus aureus)     BLADDER -- NO CURRENT ISSUES    Nerve damage     PONV (postoperative nausea and vomiting)     Restless leg syndrome     Ventral hernia         History of Present Illness  The patient is a 49-year-old female who presents today for diarrhea for 1 day.    She reports an episode of diarrhea that started at approximately 5:30 this morning. Despite abstaining from food and beverages, the diarrhea has persisted. She experiences mild nausea but does not have any exposure to individuals with influenza or COVID-19. She expresses willingness to provide a stool sample for further analysis.    She also reports a headache and slight dizziness, which she attributes to her inability to consume food or drink due to the frequent need to use the restroom.    Supplemental Information  She had a recent illness characterized by laryngitis and a cold a few weeks prior. She has a past medical history of streptococcal infection and was on a course of steroids approximately 2 to 3 weeks ago.      Objective   Vital Signs:   Vitals:    02/25/25 1131   BP: 118/74   Pulse: 101   Temp: 97.5 °F (36.4 °C)   SpO2: 98%   Weight: 88.9 kg (196 lb)   Height: 162.6 cm (64\")     Body mass index is 33.64 kg/m².    Wt Readings from Last 3 Encounters:   02/25/25 88.9 kg (196 lb)   02/04/25 88 kg (194 lb)   01/14/25 88.5 kg (195 lb)     BP Readings from Last 3 Encounters:   02/25/25 118/74 "   02/04/25 126/72   01/14/25 126/72       Health Maintenance   Topic Date Due    COVID-19 Vaccine (7 - 2024-25 season) 01/01/2026 (Originally 9/1/2024)    BMI FOLLOWUP  09/18/2025    ANNUAL WELLNESS VISIT  01/14/2026    MAMMOGRAM  01/22/2026    LIPID PANEL  01/30/2026    COLORECTAL CANCER SCREENING  03/23/2028    TDAP/TD VACCINES (2 - Td or Tdap) 02/23/2032    HEPATITIS C SCREENING  Completed    Pneumococcal Vaccine 0-49  Completed    INFLUENZA VACCINE  Completed    HEMOGLOBIN A1C  Discontinued       Review of Systems   Physical Exam  Vitals reviewed.   Constitutional:       Appearance: Normal appearance.   HENT:      Right Ear: Tympanic membrane normal.      Left Ear: Tympanic membrane normal.   Eyes:      Pupils: Pupils are equal, round, and reactive to light.   Cardiovascular:      Rate and Rhythm: Normal rate and regular rhythm.   Pulmonary:      Effort: Pulmonary effort is normal.      Breath sounds: Normal breath sounds.   Abdominal:      Tenderness: There is abdominal tenderness.   Skin:     General: Skin is warm and dry.   Neurological:      General: No focal deficit present.      Mental Status: She is alert and oriented to person, place, and time.   Psychiatric:         Mood and Affect: Mood normal.            Result Review :  The following data was reviewed by: PHILLIP Santana on 02/25/2025:  Office Visit on 02/25/2025   Component Date Value    SARS Antigen 02/25/2025 Not Detected     Influenza A Antigen GURU 02/25/2025 Not Detected     Influenza B Antigen GURU 02/25/2025 Not Detected     Internal Control 02/25/2025 Passed     Lot Number 02/25/2025 [id number]     Expiration Date 02/25/[phone]     Rapid Strep A Screen 02/25/2025 Negative     Internal Control 02/25/2025 Passed     Lot Number 02/25/2025 [id number]     Expiration Date 02/25/2025 2,282,026    Office Visit on 02/04/2025   Component Date Value    Rapid Strep A Screen 02/04/2025 Negative     Internal Control 02/04/2025 Passed     Lot Number  02/04/2025 [id number]     Expiration Date 02/04/2025 11-     SARS Antigen 02/04/2025 Not Detected     Influenza A Antigen GURU 02/04/2025 Not Detected     Influenza B Antigen GURU 02/04/2025 Not Detected     Internal Control 02/04/2025 Passed     Lot Number 02/04/2025 [id number]     Expiration Date 02/04/2025 07-        Results        Procedures        Assessment and Plan   Diagnoses and all orders for this visit:    1. Viral illness (Primary)    2. Diarrhea, unspecified type  -     Enteric Bacterial Panel - Stool, Per Rectum  -     Clostridioides difficile Toxin - Stool, Per Rectum  -     Enteric Parasite Panel - Stool, Per Rectum  -     POCT SARS-CoV-2 Antigen GURU + Flu  -     POCT rapid strep A    3. Chills  -     POCT SARS-CoV-2 Antigen GURU + Flu  -     POCT rapid strep A    4. Nausea  -     ondansetron ODT (ZOFRAN-ODT) 8 MG disintegrating tablet; Place 1 tablet on the tongue Every 8 (Eight) Hours As Needed for Nausea or Vomiting.  Dispense: 20 tablet; Refill: 0    5. Nonintractable headache, unspecified chronicity pattern, unspecified headache type  -     ketorolac (TORADOL) injection 60 mg        Assessment & Plan  1. Diarrhea.  Her symptoms are consistent with a potential viral infection, necessitating further diagnostic evaluation. Over-the-counter Imodium is suggested as an option, although it is generally recommended to allow the body to naturally expel the virus. Adequate hydration is emphasized, with a preference for electrolyte-rich fluids such as Gatorade or Bodyarmor over plain water. A stool sample will be collected for comprehensive analysis, including tests for C. difficile and salmonella. A prescription for Zofran 8mg, to be taken every 8 hours, has been provided to manage her nausea and diarrhea. The prescription was sent to [facility]. If the stool sample results indicate a bacterial infection, appropriate antibiotics will be prescribed.    2. Headache.  A Toradol injection will be  administered to alleviate her headache.    PROCEDURE  A Toradol injection was administered today to alleviate her headache.      FOLLOW UP  Return if symptoms worsen or fail to improve.    Patient was given instructions and counseling regarding her condition or for health maintenance advice. Please see specific information pulled into the AVS if appropriate.       PHILLIP Santana  02/25/25  13:28 EST    CURRENT & DISCONTINUED MEDICATIONS  Current Outpatient Medications   Medication Instructions    albuterol (ACCUNEB) 1.25 mg, Nebulization, Every 6 Hours PRN    albuterol sulfate  (90 Base) MCG/ACT inhaler 2 puffs, Inhalation, Every 6 Hours PRN    cyclobenzaprine (FLEXERIL) 10 mg, Oral, 3 Times Daily PRN    diclofenac (VOLTAREN) 75 mg, Oral, 2 Times Daily    folic acid (FOLVITE) 1 mg, Oral, Daily    ondansetron ODT (ZOFRAN-ODT) 8 mg, Translingual, Every 8 Hours PRN    rOPINIRole (REQUIP) 0.5 mg, Oral, Nightly PRN, Take 1 hour before bedtime.       Medications Discontinued During This Encounter   Medication Reason    benzonatate (Tessalon Perles) 100 MG capsule *Therapy completed    predniSONE 5 MG (21) tablet therapy pack dose pack *Therapy completed        EMR Dragon/Transcription disclaimer:  Parts of this encounter note are electronic transcription/translation of spoken language to printed text.     Patient or patient representative verbalized consent for the use of Ambient Listening during the visit with  PHILLIP Santana for chart documentation. 2/25/2025  11:46 EST

## 2025-02-26 LAB
C COLI+JEJ+UPSA DNA STL QL NAA+NON-PROBE: NOT DETECTED
CRYPTOSP DNA STL QL NAA+NON-PROBE: NOT DETECTED
E HISTOLYT DNA STL QL NAA+NON-PROBE: NOT DETECTED
EC STX1+STX2 GENES STL QL NAA+NON-PROBE: NOT DETECTED
G LAMBLIA DNA STL QL NAA+NON-PROBE: NOT DETECTED
S ENT+BONG DNA STL QL NAA+NON-PROBE: NOT DETECTED
SHIGELLA SP+EIEC IPAH ST NAA+NON-PROBE: NOT DETECTED

## 2025-02-27 ENCOUNTER — APPOINTMENT (OUTPATIENT)
Dept: CT IMAGING | Facility: HOSPITAL | Age: 50
End: 2025-02-27
Payer: MEDICARE

## 2025-02-27 ENCOUNTER — HOSPITAL ENCOUNTER (EMERGENCY)
Facility: HOSPITAL | Age: 50
Discharge: HOME | End: 2025-02-27
Attending: EMERGENCY MEDICINE | Admitting: EMERGENCY MEDICINE
Payer: MEDICARE

## 2025-02-27 VITALS
DIASTOLIC BLOOD PRESSURE: 58 MMHG | WEIGHT: 205.25 LBS | HEART RATE: 83 BPM | OXYGEN SATURATION: 99 % | SYSTOLIC BLOOD PRESSURE: 111 MMHG | HEIGHT: 64 IN | BODY MASS INDEX: 35.04 KG/M2 | RESPIRATION RATE: 18 BRPM | TEMPERATURE: 98.5 F

## 2025-02-27 DIAGNOSIS — R19.7 DIARRHEA, UNSPECIFIED TYPE: Primary | ICD-10-CM

## 2025-02-27 LAB
ALBUMIN SERPL-MCNC: 3.9 G/DL (ref 3.5–5.2)
ALBUMIN/GLOB SERPL: 1.2 G/DL
ALP SERPL-CCNC: 95 U/L (ref 39–117)
ALT SERPL W P-5'-P-CCNC: 94 U/L (ref 1–33)
ANION GAP SERPL CALCULATED.3IONS-SCNC: 9.7 MMOL/L (ref 5–15)
AST SERPL-CCNC: 57 U/L (ref 1–32)
BACTERIA UR QL AUTO: ABNORMAL /HPF
BASOPHILS # BLD AUTO: 0.02 10*3/MM3 (ref 0–0.2)
BASOPHILS NFR BLD AUTO: 0.4 % (ref 0–1.5)
BILIRUB SERPL-MCNC: 0.4 MG/DL (ref 0–1.2)
BILIRUB UR QL STRIP: NEGATIVE
BUN SERPL-MCNC: 13 MG/DL (ref 6–20)
BUN/CREAT SERPL: 18.1 (ref 7–25)
CALCIUM SPEC-SCNC: 8.5 MG/DL (ref 8.6–10.5)
CHLORIDE SERPL-SCNC: 107 MMOL/L (ref 98–107)
CLARITY UR: CLEAR
CO2 SERPL-SCNC: 22.3 MMOL/L (ref 22–29)
COLOR UR: YELLOW
CREAT SERPL-MCNC: 0.72 MG/DL (ref 0.57–1)
DEPRECATED RDW RBC AUTO: 43.3 FL (ref 37–54)
EGFRCR SERPLBLD CKD-EPI 2021: 103.9 ML/MIN/1.73
EOSINOPHIL # BLD AUTO: 0.4 10*3/MM3 (ref 0–0.4)
EOSINOPHIL NFR BLD AUTO: 7.6 % (ref 0.3–6.2)
ERYTHROCYTE [DISTWIDTH] IN BLOOD BY AUTOMATED COUNT: 12.9 % (ref 12.3–15.4)
FLUAV SUBTYP SPEC NAA+PROBE: NOT DETECTED
FLUBV RNA ISLT QL NAA+PROBE: NOT DETECTED
GLOBULIN UR ELPH-MCNC: 3.2 GM/DL
GLUCOSE SERPL-MCNC: 85 MG/DL (ref 65–99)
GLUCOSE UR STRIP-MCNC: NEGATIVE MG/DL
HCT VFR BLD AUTO: 44.3 % (ref 34–46.6)
HGB BLD-MCNC: 14.3 G/DL (ref 12–15.9)
HGB UR QL STRIP.AUTO: NEGATIVE
HOLD SPECIMEN: NORMAL
HOLD SPECIMEN: NORMAL
HYALINE CASTS UR QL AUTO: ABNORMAL /LPF
IMM GRANULOCYTES # BLD AUTO: 0.01 10*3/MM3 (ref 0–0.05)
IMM GRANULOCYTES NFR BLD AUTO: 0.2 % (ref 0–0.5)
KETONES UR QL STRIP: ABNORMAL
LEUKOCYTE ESTERASE UR QL STRIP.AUTO: ABNORMAL
LIPASE SERPL-CCNC: 27 U/L (ref 13–60)
LYMPHOCYTES # BLD AUTO: 0.91 10*3/MM3 (ref 0.7–3.1)
LYMPHOCYTES NFR BLD AUTO: 17.4 % (ref 19.6–45.3)
MCH RBC QN AUTO: 29.5 PG (ref 26.6–33)
MCHC RBC AUTO-ENTMCNC: 32.3 G/DL (ref 31.5–35.7)
MCV RBC AUTO: 91.5 FL (ref 79–97)
MONOCYTES # BLD AUTO: 0.45 10*3/MM3 (ref 0.1–0.9)
MONOCYTES NFR BLD AUTO: 8.6 % (ref 5–12)
MUCOUS THREADS URNS QL MICRO: ABNORMAL /HPF
NEUTROPHILS NFR BLD AUTO: 3.44 10*3/MM3 (ref 1.7–7)
NEUTROPHILS NFR BLD AUTO: 65.8 % (ref 42.7–76)
NITRITE UR QL STRIP: NEGATIVE
NRBC BLD AUTO-RTO: 0 /100 WBC (ref 0–0.2)
PH UR STRIP.AUTO: 5.5 [PH] (ref 5–8)
PLATELET # BLD AUTO: 263 10*3/MM3 (ref 140–450)
PMV BLD AUTO: 7.8 FL (ref 6–12)
POTASSIUM SERPL-SCNC: 3.9 MMOL/L (ref 3.5–5.2)
PROT SERPL-MCNC: 7.1 G/DL (ref 6–8.5)
PROT UR QL STRIP: ABNORMAL
RBC # BLD AUTO: 4.84 10*6/MM3 (ref 3.77–5.28)
RBC # UR STRIP: ABNORMAL /HPF
REF LAB TEST METHOD: ABNORMAL
RSV RNA NPH QL NAA+NON-PROBE: NOT DETECTED
SARS-COV-2 RNA RESP QL NAA+PROBE: NOT DETECTED
SODIUM SERPL-SCNC: 139 MMOL/L (ref 136–145)
SP GR UR STRIP: 1.02 (ref 1–1.03)
SQUAMOUS #/AREA URNS HPF: ABNORMAL /HPF
UROBILINOGEN UR QL STRIP: ABNORMAL
WBC # UR STRIP: ABNORMAL /HPF
WBC NRBC COR # BLD AUTO: 5.23 10*3/MM3 (ref 3.4–10.8)
WHOLE BLOOD HOLD COAG: NORMAL
WHOLE BLOOD HOLD SPECIMEN: NORMAL

## 2025-02-27 PROCEDURE — 85025 COMPLETE CBC W/AUTO DIFF WBC: CPT | Performed by: EMERGENCY MEDICINE

## 2025-02-27 PROCEDURE — 74177 CT ABD & PELVIS W/CONTRAST: CPT

## 2025-02-27 PROCEDURE — 96375 TX/PRO/DX INJ NEW DRUG ADDON: CPT

## 2025-02-27 PROCEDURE — 96372 THER/PROPH/DIAG INJ SC/IM: CPT

## 2025-02-27 PROCEDURE — 25510000001 IOPAMIDOL PER 1 ML: Performed by: EMERGENCY MEDICINE

## 2025-02-27 PROCEDURE — 80053 COMPREHEN METABOLIC PANEL: CPT | Performed by: EMERGENCY MEDICINE

## 2025-02-27 PROCEDURE — 25010000002 KETOROLAC TROMETHAMINE PER 15 MG: Performed by: EMERGENCY MEDICINE

## 2025-02-27 PROCEDURE — 25010000002 DICYCLOMINE PER 20 MG: Performed by: EMERGENCY MEDICINE

## 2025-02-27 PROCEDURE — 25810000003 SODIUM CHLORIDE 0.9 % SOLUTION: Performed by: EMERGENCY MEDICINE

## 2025-02-27 PROCEDURE — 96374 THER/PROPH/DIAG INJ IV PUSH: CPT

## 2025-02-27 PROCEDURE — 83690 ASSAY OF LIPASE: CPT | Performed by: EMERGENCY MEDICINE

## 2025-02-27 PROCEDURE — 81001 URINALYSIS AUTO W/SCOPE: CPT | Performed by: EMERGENCY MEDICINE

## 2025-02-27 PROCEDURE — 99285 EMERGENCY DEPT VISIT HI MDM: CPT

## 2025-02-27 PROCEDURE — 25010000002 MORPHINE PER 10 MG: Performed by: EMERGENCY MEDICINE

## 2025-02-27 PROCEDURE — 87637 SARSCOV2&INF A&B&RSV AMP PRB: CPT | Performed by: EMERGENCY MEDICINE

## 2025-02-27 PROCEDURE — 25010000002 CEFTRIAXONE PER 250 MG: Performed by: EMERGENCY MEDICINE

## 2025-02-27 RX ORDER — IOPAMIDOL 755 MG/ML
100 INJECTION, SOLUTION INTRAVASCULAR
Status: COMPLETED | OUTPATIENT
Start: 2025-02-27 | End: 2025-02-27

## 2025-02-27 RX ORDER — DICYCLOMINE HCL 20 MG
20 TABLET ORAL EVERY 6 HOURS
Qty: 20 TABLET | Refills: 0 | Status: SHIPPED | OUTPATIENT
Start: 2025-02-27

## 2025-02-27 RX ORDER — SODIUM CHLORIDE 0.9 % (FLUSH) 0.9 %
10 SYRINGE (ML) INJECTION AS NEEDED
Status: DISCONTINUED | OUTPATIENT
Start: 2025-02-27 | End: 2025-02-27 | Stop reason: HOSPADM

## 2025-02-27 RX ORDER — CIPROFLOXACIN 500 MG/1
500 TABLET, FILM COATED ORAL EVERY 12 HOURS
Qty: 20 TABLET | Refills: 0 | Status: SHIPPED | OUTPATIENT
Start: 2025-02-27

## 2025-02-27 RX ORDER — ONDANSETRON 4 MG/1
4 TABLET, ORALLY DISINTEGRATING ORAL EVERY 8 HOURS PRN
Qty: 15 TABLET | Refills: 0 | Status: SHIPPED | OUTPATIENT
Start: 2025-02-27

## 2025-02-27 RX ORDER — DICYCLOMINE HYDROCHLORIDE 10 MG/ML
20 INJECTION INTRAMUSCULAR ONCE
Status: COMPLETED | OUTPATIENT
Start: 2025-02-27 | End: 2025-02-27

## 2025-02-27 RX ORDER — KETOROLAC TROMETHAMINE 30 MG/ML
30 INJECTION, SOLUTION INTRAMUSCULAR; INTRAVENOUS ONCE
Status: COMPLETED | OUTPATIENT
Start: 2025-02-27 | End: 2025-02-27

## 2025-02-27 RX ADMIN — KETOROLAC TROMETHAMINE 30 MG: 30 INJECTION, SOLUTION INTRAMUSCULAR; INTRAVENOUS at 11:42

## 2025-02-27 RX ADMIN — MORPHINE SULFATE 4 MG: 4 INJECTION, SOLUTION INTRAMUSCULAR; INTRAVENOUS at 11:43

## 2025-02-27 RX ADMIN — SODIUM CHLORIDE 1000 ML: 9 INJECTION, SOLUTION INTRAVENOUS at 11:55

## 2025-02-27 RX ADMIN — IOPAMIDOL 100 ML: 755 INJECTION, SOLUTION INTRAVENOUS at 12:46

## 2025-02-27 RX ADMIN — DICYCLOMINE HYDROCHLORIDE 20 MG: 10 INJECTION, SOLUTION INTRAMUSCULAR at 11:56

## 2025-02-27 RX ADMIN — SODIUM CHLORIDE 1000 MG: 9 INJECTION INTRAMUSCULAR; INTRAVENOUS; SUBCUTANEOUS at 11:58
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
## 2025-03-11 ENCOUNTER — OFFICE VISIT (OUTPATIENT)
Dept: FAMILY MEDICINE CLINIC | Facility: CLINIC | Age: 50
End: 2025-03-11
Payer: MEDICARE

## 2025-03-11 VITALS
OXYGEN SATURATION: 98 % | BODY MASS INDEX: 33.46 KG/M2 | WEIGHT: 196 LBS | TEMPERATURE: 97.5 F | HEART RATE: 102 BPM | DIASTOLIC BLOOD PRESSURE: 72 MMHG | SYSTOLIC BLOOD PRESSURE: 110 MMHG | HEIGHT: 64 IN

## 2025-03-11 DIAGNOSIS — Z09 ENCOUNTER FOR EXAMINATION FOLLOWING TREATMENT AT HOSPITAL: Primary | ICD-10-CM

## 2025-03-11 DIAGNOSIS — R19.7 DIARRHEA, UNSPECIFIED TYPE: ICD-10-CM

## 2025-03-11 DIAGNOSIS — R79.89 ELEVATED LFTS: ICD-10-CM

## 2025-03-11 DIAGNOSIS — R10.13 EPIGASTRIC PAIN: ICD-10-CM

## 2025-03-11 PROCEDURE — 80053 COMPREHEN METABOLIC PANEL: CPT | Performed by: NURSE PRACTITIONER

## 2025-03-11 NOTE — PROGRESS NOTES
Venipuncture Blood Specimen Collection  Venipuncture performed in left arm by Shawanda Montes De Oca with good hemostasis. Patient tolerated the procedure well without complications.   03/11/25   Sheng Wilkins MA
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
## 2025-03-12 LAB
ALBUMIN SERPL-MCNC: 3.9 G/DL (ref 3.5–5.2)
ALBUMIN/GLOB SERPL: 1.4 G/DL
ALP SERPL-CCNC: 66 U/L (ref 39–117)
ALT SERPL W P-5'-P-CCNC: 19 U/L (ref 1–33)
ANION GAP SERPL CALCULATED.3IONS-SCNC: 11.3 MMOL/L (ref 5–15)
AST SERPL-CCNC: 20 U/L (ref 1–32)
BILIRUB SERPL-MCNC: <0.2 MG/DL (ref 0–1.2)
BUN SERPL-MCNC: 15 MG/DL (ref 6–20)
BUN/CREAT SERPL: 17.9 (ref 7–25)
CALCIUM SPEC-SCNC: 9.3 MG/DL (ref 8.6–10.5)
CHLORIDE SERPL-SCNC: 107 MMOL/L (ref 98–107)
CO2 SERPL-SCNC: 25.7 MMOL/L (ref 22–29)
CREAT SERPL-MCNC: 0.84 MG/DL (ref 0.57–1)
EGFRCR SERPLBLD CKD-EPI 2021: 85.8 ML/MIN/1.73
GLOBULIN UR ELPH-MCNC: 2.8 GM/DL
GLUCOSE SERPL-MCNC: 99 MG/DL (ref 65–99)
POTASSIUM SERPL-SCNC: 4.2 MMOL/L (ref 3.5–5.2)
PROT SERPL-MCNC: 6.7 G/DL (ref 6–8.5)
SODIUM SERPL-SCNC: 144 MMOL/L (ref 136–145)

## (undated) DEVICE — GOWN,REINFORCE,POLY,SIRUS,BREATH SLV,XLG: Brand: MEDLINE

## (undated) DEVICE — Device

## (undated) DEVICE — SOL IRR NACL 0.9PCT BT 1000ML

## (undated) DEVICE — LAPAROVUE VISIBILITY SYSTEM LAPAROSCOPIC SOLUTIONS: Brand: LAPAROVUE

## (undated) DEVICE — CONN JET HYDRA H20 AUXILIARY DISP

## (undated) DEVICE — SOLIDIFIER LIQLOC PLS 1500CC BT

## (undated) DEVICE — ENDOPATH PNEUMONEEDLE INSUFFLATION NEEDLES WITH LUER LOCK CONNECTORS 120MM: Brand: ENDOPATH

## (undated) DEVICE — GLV SURG SENSICARE PI ORTHO SZ8 LF STRL

## (undated) DEVICE — NON-WOVEN ADHESIVE WOUND DRESSING: Brand: PRIMAPORE ADHESIVE WOUND DRSG 7.2*5CM

## (undated) DEVICE — SEAL

## (undated) DEVICE — TUBING, SUCTION, 1/4" X 10', STRAIGHT: Brand: MEDLINE

## (undated) DEVICE — LAPAROSCOPIC SCISSORS: Brand: EPIX LAPAROSCOPIC SCISSORS

## (undated) DEVICE — BLADELESS OBTURATOR: Brand: WECK VISTA

## (undated) DEVICE — DAVINCI-LF: Brand: MEDLINE INDUSTRIES, INC.

## (undated) DEVICE — Device: Brand: DEFENDO AIR/WATER/SUCTION AND BIOPSY VALVE

## (undated) DEVICE — ARM DRAPE

## (undated) DEVICE — SINGLE-USE BIOPSY FORCEPS: Brand: RADIAL JAW 4

## (undated) DEVICE — STERILE POLYISOPRENE POWDER-FREE SURGICAL GLOVES WITH EMOLLIENT COATING: Brand: PROTEXIS

## (undated) DEVICE — SLV SCD KN/LEN ADJ EXPRSS BLENDED MD 1P/U

## (undated) DEVICE — LINER SURG CANSTR SXN S/RIGD 1500CC

## (undated) DEVICE — TBG INSUFFLATION W/CPC CONNEC: Brand: MEDLINE INDUSTRIES, INC.

## (undated) DEVICE — SYR LUERLOK 30CC

## (undated) DEVICE — INTENDED FOR TISSUE SEPARATION, AND OTHER PROCEDURES THAT REQUIRE A SHARP SURGICAL BLADE TO PUNCTURE OR CUT.: Brand: BARD-PARKER ® CARBON RIB-BACK BLADES

## (undated) DEVICE — STERILE POLYISOPRENE POWDER-FREE SURGICAL GLOVES: Brand: PROTEXIS

## (undated) DEVICE — ENDOPATH XCEL WITH OPTIVIEW TECHNOLOGY BLADELESS TROCARS WITH STABILITY SLEEVES: Brand: ENDOPATH XCEL OPTIVIEW

## (undated) DEVICE — ANTIBACTERIAL UNDYED BRAIDED (POLYGLACTIN 910), SYNTHETIC ABSORBABLE SUTURE: Brand: COATED VICRYL

## (undated) DEVICE — SYS CLOSE PORTII CARTR/THOMASN XL

## (undated) DEVICE — PENCL ES MEGADINE EZ/CLEAN BUTN W/HOLSTR 10FT

## (undated) DEVICE — SOL IRRG H2O PL/BG 1000ML STRL

## (undated) DEVICE — TIP COVER ACCESSORY

## (undated) DEVICE — SYR LL TP 10ML STRL

## (undated) DEVICE — STRIP CLS WND SUTURESTRIP/PLS 0.5X4IN TP1103

## (undated) DEVICE — SUT MERSILENE POLYSTR UR6 BR 0 75CM GRN

## (undated) DEVICE — TOTAL TRAY, 16FR 10ML SIL FOLEY, URN: Brand: MEDLINE

## (undated) DEVICE — SOL IRR H2O BTL 1000ML STRL